# Patient Record
Sex: FEMALE | Race: WHITE | NOT HISPANIC OR LATINO | URBAN - METROPOLITAN AREA
[De-identification: names, ages, dates, MRNs, and addresses within clinical notes are randomized per-mention and may not be internally consistent; named-entity substitution may affect disease eponyms.]

---

## 2017-03-07 ENCOUNTER — INPATIENT (INPATIENT)
Facility: HOSPITAL | Age: 82
LOS: 1 days | Discharge: HOME | End: 2017-03-09
Attending: INTERNAL MEDICINE | Admitting: INTERNAL MEDICINE
Payer: MEDICARE

## 2017-03-07 VITALS
OXYGEN SATURATION: 100 % | HEART RATE: 89 BPM | SYSTOLIC BLOOD PRESSURE: 131 MMHG | RESPIRATION RATE: 16 BRPM | TEMPERATURE: 98 F | DIASTOLIC BLOOD PRESSURE: 86 MMHG

## 2017-03-07 DIAGNOSIS — Z41.8 ENCOUNTER FOR OTHER PROCEDURES FOR PURPOSES OTHER THAN REMEDYING HEALTH STATE: ICD-10-CM

## 2017-03-07 DIAGNOSIS — I20.0 UNSTABLE ANGINA: ICD-10-CM

## 2017-03-07 DIAGNOSIS — I48.91 UNSPECIFIED ATRIAL FIBRILLATION: ICD-10-CM

## 2017-03-07 DIAGNOSIS — I10 ESSENTIAL (PRIMARY) HYPERTENSION: ICD-10-CM

## 2017-03-07 DIAGNOSIS — I25.10 ATHEROSCLEROTIC HEART DISEASE OF NATIVE CORONARY ARTERY WITHOUT ANGINA PECTORIS: ICD-10-CM

## 2017-03-07 LAB
ALBUMIN SERPL ELPH-MCNC: 4.1 G/DL — SIGNIFICANT CHANGE UP (ref 3.3–5)
ALP SERPL-CCNC: 94 U/L — SIGNIFICANT CHANGE UP (ref 40–120)
ALT FLD-CCNC: 13 U/L — SIGNIFICANT CHANGE UP (ref 4–33)
APTT BLD: 29.7 SEC — SIGNIFICANT CHANGE UP (ref 27.5–37.4)
APTT BLD: 34.8 SEC — SIGNIFICANT CHANGE UP (ref 27.5–37.4)
AST SERPL-CCNC: 33 U/L — HIGH (ref 4–32)
BASE EXCESS BLDV CALC-SCNC: 3.1 MMOL/L — SIGNIFICANT CHANGE UP
BASOPHILS # BLD AUTO: 0.01 K/UL — SIGNIFICANT CHANGE UP (ref 0–0.2)
BASOPHILS NFR BLD AUTO: 0.2 % — SIGNIFICANT CHANGE UP (ref 0–2)
BILIRUB SERPL-MCNC: 0.3 MG/DL — SIGNIFICANT CHANGE UP (ref 0.2–1.2)
BLOOD GAS VENOUS - CREATININE: 0.86 MG/DL — SIGNIFICANT CHANGE UP (ref 0.5–1.3)
BUN SERPL-MCNC: 24 MG/DL — HIGH (ref 7–23)
CALCIUM SERPL-MCNC: 9.7 MG/DL — SIGNIFICANT CHANGE UP (ref 8.4–10.5)
CHLORIDE BLDV-SCNC: 106 MMOL/L — SIGNIFICANT CHANGE UP (ref 96–108)
CHLORIDE SERPL-SCNC: 105 MMOL/L — SIGNIFICANT CHANGE UP (ref 98–107)
CK MB BLD-MCNC: 10.84 NG/ML — HIGH (ref 1–4.7)
CK MB BLD-MCNC: 7.57 NG/ML — HIGH (ref 1–4.7)
CK MB BLD-MCNC: SIGNIFICANT CHANGE UP (ref 0–2.5)
CK SERPL-CCNC: 142 U/L — SIGNIFICANT CHANGE UP (ref 25–170)
CK SERPL-CCNC: 148 U/L — SIGNIFICANT CHANGE UP (ref 25–170)
CO2 SERPL-SCNC: 20 MMOL/L — LOW (ref 22–31)
CREAT SERPL-MCNC: 0.84 MG/DL — SIGNIFICANT CHANGE UP (ref 0.5–1.3)
EOSINOPHIL # BLD AUTO: 0.03 K/UL — SIGNIFICANT CHANGE UP (ref 0–0.5)
EOSINOPHIL NFR BLD AUTO: 0.5 % — SIGNIFICANT CHANGE UP (ref 0–6)
GAS PNL BLDV: 138 MMOL/L — SIGNIFICANT CHANGE UP (ref 136–146)
GLUCOSE BLDV-MCNC: 103 — HIGH (ref 70–99)
GLUCOSE SERPL-MCNC: 103 MG/DL — HIGH (ref 70–99)
HCO3 BLDV-SCNC: 25 MMOL/L — SIGNIFICANT CHANGE UP (ref 20–27)
HCT VFR BLD CALC: 39.9 % — SIGNIFICANT CHANGE UP (ref 34.5–45)
HCT VFR BLDV CALC: 39.1 % — SIGNIFICANT CHANGE UP (ref 34.5–45)
HGB BLD-MCNC: 12.7 G/DL — SIGNIFICANT CHANGE UP (ref 11.5–15.5)
HGB BLDV-MCNC: 12.7 G/DL — SIGNIFICANT CHANGE UP (ref 11.5–15.5)
IMM GRANULOCYTES NFR BLD AUTO: 0.2 % — SIGNIFICANT CHANGE UP (ref 0–1.5)
INR BLD: 0.97 — SIGNIFICANT CHANGE UP (ref 0.87–1.18)
INR BLD: 1.06 — SIGNIFICANT CHANGE UP (ref 0.87–1.18)
LACTATE BLDV-MCNC: 2 MMOL/L — SIGNIFICANT CHANGE UP (ref 0.5–2)
LYMPHOCYTES # BLD AUTO: 1.24 K/UL — SIGNIFICANT CHANGE UP (ref 1–3.3)
LYMPHOCYTES # BLD AUTO: 22.2 % — SIGNIFICANT CHANGE UP (ref 13–44)
MCHC RBC-ENTMCNC: 28.5 PG — SIGNIFICANT CHANGE UP (ref 27–34)
MCHC RBC-ENTMCNC: 31.8 % — LOW (ref 32–36)
MCV RBC AUTO: 89.7 FL — SIGNIFICANT CHANGE UP (ref 80–100)
MONOCYTES # BLD AUTO: 0.26 K/UL — SIGNIFICANT CHANGE UP (ref 0–0.9)
MONOCYTES NFR BLD AUTO: 4.7 % — SIGNIFICANT CHANGE UP (ref 2–14)
NEUTROPHILS # BLD AUTO: 4.03 K/UL — SIGNIFICANT CHANGE UP (ref 1.8–7.4)
NEUTROPHILS NFR BLD AUTO: 72.2 % — SIGNIFICANT CHANGE UP (ref 43–77)
NT-PROBNP SERPL-SCNC: 1114 PG/ML — SIGNIFICANT CHANGE UP
PCO2 BLDV: 55 MMHG — HIGH (ref 41–51)
PH BLDV: 7.33 PH — SIGNIFICANT CHANGE UP (ref 7.32–7.43)
PLATELET # BLD AUTO: 237 K/UL — SIGNIFICANT CHANGE UP (ref 150–400)
PMV BLD: 10.6 FL — SIGNIFICANT CHANGE UP (ref 7–13)
PO2 BLDV: 29 MMHG — LOW (ref 35–40)
POTASSIUM BLDV-SCNC: 4.9 MMOL/L — HIGH (ref 3.4–4.5)
POTASSIUM SERPL-MCNC: 4.5 MMOL/L — SIGNIFICANT CHANGE UP (ref 3.5–5.3)
POTASSIUM SERPL-SCNC: 4.5 MMOL/L — SIGNIFICANT CHANGE UP (ref 3.5–5.3)
PROT SERPL-MCNC: 8 G/DL — SIGNIFICANT CHANGE UP (ref 6–8.3)
PROTHROM AB SERPL-ACNC: 11.1 SEC — SIGNIFICANT CHANGE UP (ref 10–13.1)
PROTHROM AB SERPL-ACNC: 12.1 SEC — SIGNIFICANT CHANGE UP (ref 10–13.1)
RBC # BLD: 4.45 M/UL — SIGNIFICANT CHANGE UP (ref 3.8–5.2)
RBC # FLD: 14.3 % — SIGNIFICANT CHANGE UP (ref 10.3–14.5)
SAO2 % BLDV: 44.2 % — LOW (ref 60–85)
SODIUM SERPL-SCNC: 141 MMOL/L — SIGNIFICANT CHANGE UP (ref 135–145)
T4 FREE SERPL-MCNC: 1.07 NG/DL — SIGNIFICANT CHANGE UP (ref 0.9–1.8)
TROPONIN T SERPL-MCNC: 0.07 NG/ML — HIGH (ref 0–0.06)
TROPONIN T SERPL-MCNC: < 0.06 NG/ML — SIGNIFICANT CHANGE UP (ref 0–0.06)
TSH SERPL-MCNC: 2.95 UIU/ML — SIGNIFICANT CHANGE UP (ref 0.27–4.2)
WBC # BLD: 5.58 K/UL — SIGNIFICANT CHANGE UP (ref 3.8–10.5)
WBC # FLD AUTO: 5.58 K/UL — SIGNIFICANT CHANGE UP (ref 3.8–10.5)

## 2017-03-07 PROCEDURE — 71010: CPT | Mod: 26

## 2017-03-07 RX ORDER — METOPROLOL TARTRATE 50 MG
12.5 TABLET ORAL
Qty: 0 | Refills: 0 | Status: DISCONTINUED | OUTPATIENT
Start: 2017-03-07 | End: 2017-03-08

## 2017-03-07 RX ORDER — DIGOXIN 250 MCG
0.5 TABLET ORAL ONCE
Qty: 0 | Refills: 0 | Status: COMPLETED | OUTPATIENT
Start: 2017-03-07 | End: 2017-03-07

## 2017-03-07 RX ORDER — HEPARIN SODIUM 5000 [USP'U]/ML
INJECTION INTRAVENOUS; SUBCUTANEOUS
Qty: 25000 | Refills: 0 | Status: DISCONTINUED | OUTPATIENT
Start: 2017-03-07 | End: 2017-03-08

## 2017-03-07 RX ORDER — CLOPIDOGREL BISULFATE 75 MG/1
75 TABLET, FILM COATED ORAL DAILY
Qty: 0 | Refills: 0 | Status: DISCONTINUED | OUTPATIENT
Start: 2017-03-07 | End: 2017-03-09

## 2017-03-07 RX ORDER — CHOLECALCIFEROL (VITAMIN D3) 125 MCG
1000 CAPSULE ORAL DAILY
Qty: 0 | Refills: 0 | Status: DISCONTINUED | OUTPATIENT
Start: 2017-03-07 | End: 2017-03-09

## 2017-03-07 RX ORDER — HEPARIN SODIUM 5000 [USP'U]/ML
3000 INJECTION INTRAVENOUS; SUBCUTANEOUS EVERY 6 HOURS
Qty: 0 | Refills: 0 | Status: DISCONTINUED | OUTPATIENT
Start: 2017-03-07 | End: 2017-03-08

## 2017-03-07 RX ORDER — HEPARIN SODIUM 5000 [USP'U]/ML
6500 INJECTION INTRAVENOUS; SUBCUTANEOUS EVERY 6 HOURS
Qty: 0 | Refills: 0 | Status: DISCONTINUED | OUTPATIENT
Start: 2017-03-07 | End: 2017-03-08

## 2017-03-07 RX ADMIN — HEPARIN SODIUM 1400 UNIT(S)/HR: 5000 INJECTION INTRAVENOUS; SUBCUTANEOUS at 18:54

## 2017-03-07 RX ADMIN — Medication 0.5 MILLIGRAM(S): at 11:49

## 2017-03-07 NOTE — ED PROVIDER NOTE - ATTENDING CONTRIBUTION TO CARE
87F PMH HTN, CAD s/p quadruple bypass and 4 stents with 2 re-occluding p/w substernal chest pressure, nonradiating since 5:30am a/w nausea, SOB and mild diaphoresis. PT took 3 sublingual nitro tabs at home, received additional 1 nitro spray by EMS with improvement of pain (now 1/10 from 10/10). No abdominal pain, fever/chills. On exam patient in no distress.  a fib with RVR, O2 sat 99 on 2L, lungs clear heart sounds normal abdo soft non tender ext no swelling/tenderness, non focal neuro exam. EKG a fib with V rate 115, TWI in I and aVL, ST depression in V3-5. New since 2010. Imp Unstable angina/?NSTEMI assoc with likely new onset a fib with slightly rapid VR. Plan: O2, monitor, labs, CE, CXR, IV Diltiazem for rate control, tele admission. Will d/w [facility] hospitalist

## 2017-03-07 NOTE — H&P ADULT. - NEGATIVE GENERAL SYMPTOMS
no fatigue/no polyuria/no fever/no polydipsia/no anorexia/no weight gain/no weight loss/no chills/no polyphagia/no malaise

## 2017-03-07 NOTE — ED ADULT NURSE NOTE - OBJECTIVE STATEMENT
Patient received into room 2 AA&Ox3 c/o of chest pressure and pain with nausea and SOB since this AM - pt. notes taking 3 sublingual nitroglycerin tablets at home with 1 nitro spray by EMS that resolved pain. VSS on 2L NC. Patient denies chest pain, N/V, SOB, abdominal pain, fever, chills at this time. Patient able to ambulate with standby assist, skin intact. 20g PIV in place to left FA, labs drawn, medications administered - will continue to monitor.

## 2017-03-07 NOTE — H&P ADULT. - RS GEN PE MLT RESP DETAILS PC
no chest wall tenderness/normal/no rhonchi/breath sounds equal/no intercostal retractions/clear to auscultation bilaterally/good air movement/airway patent/no wheezes/no rales/respirations non-labored/no subcutaneous emphysema

## 2017-03-07 NOTE — H&P ADULT. - NEGATIVE NEUROLOGICAL SYMPTOMS
no tremors/no facial palsy/no generalized seizures/no hemiparesis/no loss of consciousness/no paresthesias/no difficulty walking/no confusion/no focal seizures/no loss of sensation/no transient paralysis/no weakness/no vertigo/no syncope

## 2017-03-07 NOTE — H&P ADULT. - HISTORY OF PRESENT ILLNESS
86 y/o F with PMhx of CAD s/p quadruple bypass (1989) and 4 stents presents to ED with generalized chest pain x 1 day. Pt states the pain was a 10/10 and felt like a burning sensation that radiated up to her neck and felt like it was "choking" her. Pt endorses associated HA, SOB, diaphoresis, nausea and episodes of dizziness. Pt took 4 nitro at home which alleviated her symptoms minimally. EMS gave the pt 2 nitro sprays and put her on oxygen, which did relieve her symptoms. Pt states when she got to the ED her pain was at a 3/10, and is now at a 0/10. Pt denies fever, chills, abdominal pain, vomiting, visual disturbances or urinary symptoms.    Upon arrival to the ED pt was found in rapid AFib s/p digoxin 0.5mg IVP, diltiazem 10mg IVP, diltiazem 60mg PO and metoprolol 12.5mg PO. She converted to NSR.   Pt was seen by house cardiology, plan for TTE tomorrow. Keep NPO after midnight in case the pt goes back into A fib and there is need for YARITZA/DCCV.

## 2017-03-07 NOTE — H&P ADULT. - NEUROLOGICAL DETAILS
responds to pain/no spontaneous movement/normal strength/responds to verbal commands/alert and oriented x 3/sensation intact

## 2017-03-07 NOTE — ED PROVIDER NOTE - PROGRESS NOTE DETAILS
After 10mg IV cardizem, rate responded, now in 80's-90's, 's systolic. For admission under [facility] hospitalist for r/o ACS, new afib. [facility] answering service called, hospitalist paged x 1, awaiting callback. D/w [facility] hospitalist; accepted for admission. Rate back to 130's,  systolic will give additional 10mg IV cardizem. Given ongoing pain and recurrent rapid afib, [facility] hospitalist amenable to calling house cardiology for rapid eval. MAR textpage sent. Pt and family aware and amenable to plan. Attending progress note: Patient c/o chest pain again. HR now 130 /70, EKG unchanged from prior. Treated with O2, and further IV cardizem for rate control, with improvement of symptoms. [facility] hospitalist aware and recommends house cardiology consult- notified. Admitted to tele

## 2017-03-07 NOTE — ED ADULT NURSE NOTE - CHIEF COMPLAINT QUOTE
brought in by EMS from home for c/o chest tightness since 0530 this am. Pt took own NTG SL x 3, EMS gave ASA 162mg, NTG spray x 1. Pt rates pain 1/10 now from 10/10. Hx CABGx 4 ,stents x 2, Angina. HR irregular, ranging 60s-120s in triage. Pt and daughter denies any hx Afib

## 2017-03-07 NOTE — ED ADULT TRIAGE NOTE - CHIEF COMPLAINT QUOTE
brought in by EMS from home for c/o chest tightness since 0530 this am. Pt took own NTG SL x 3, EMS gave ASA 162mg, NTG spray x 1. Pt rates pain 1/10 now from 10/10. Hx CABGx 4 ,stents x 2, Angina. brought in by EMS from home for c/o chest tightness since 0530 this am. Pt took own NTG SL x 3, EMS gave ASA 162mg, NTG spray x 1. Pt rates pain 1/10 now from 10/10. Hx CABGx 4 ,stents x 2, Angina. HR irregular, ranging 60s-120s in triage. Pt and daughter denies any hx Afib

## 2017-03-07 NOTE — H&P ADULT. - FAMILY HISTORY
Father  Still living? Unknown  Family history of hypertension, Age at diagnosis: Age Unknown     Mother  Still living? Unknown  Family history of hypertension, Age at diagnosis: Age Unknown

## 2017-03-07 NOTE — ED PROVIDER NOTE - OBJECTIVE STATEMENT
87F PMH HTN, CAD s/p quadruple bypass and 4 stents with 2 re-occluding p/w substernal chest pressure, nonradiating since 5:30am a/w nausea, SOB and mild diaphoresis. PT took 3 sublingual nitro tabs at home, received additional 1 nitro spray by EMS with improvement of pain (now 1/10 from 10/10). No abdominal pain, fever/chills.

## 2017-03-07 NOTE — H&P ADULT. - PROBLEM SELECTOR PLAN 2
- Continue Plavix 75mg QD  - no ASA as per Dr. Goldberg (cardiologist) - 2013 - Continue Plavix 75mg QD  - no ASA as per Dr. Goldberg (private cardiologist) - 2013

## 2017-03-07 NOTE — H&P ADULT. - PROBLEM SELECTOR PLAN 1
- Admit to telemetry  - Serial EKG's, trend CE's  - CBC, BMP, lipids in the am  - plan for TTE tomorrow.   - keep pt NPO after midnight in case she goes back into afib and will require YARITZA/DCCV in the am  - F/U house cardiology consult - Admit to telemetry  - Serial EKG's, trend CE's  - CBC, BMP, lipids in the am  - Seen by house cards - if converts back in to AFib, plan for TTE/DCCV tomorrow, c/w plavix, start heparin drip, metoprolol 12.5mg bid, check TTE  - F/U house cardiology consult

## 2017-03-07 NOTE — H&P ADULT. - ASSESSMENT
86 yo F with PMHx of CAD s/p quadruple bypass (1989) and 4 stents admitted to telemetry with chest pain and new onset AFib.

## 2017-03-07 NOTE — H&P ADULT. - NEGATIVE GASTROINTESTINAL SYMPTOMS
no abdominal pain/no flatulence/no jaundice/no hiccoughs/no steatorrhea/no melena/no hematochezia/no vomiting/no constipation/no diarrhea/no change in bowel habits

## 2017-03-07 NOTE — H&P ADULT. - NEGATIVE CARDIOVASCULAR SYMPTOMS
no peripheral edema/no claudication/no palpitations/no paroxysmal nocturnal dyspnea/no orthopnea no paroxysmal nocturnal dyspnea/no peripheral edema/no orthopnea/no claudication

## 2017-03-07 NOTE — H&P ADULT. - PMH
Benign Hypertension    Bilateral Acute Angle-Closure Glaucoma    Coronary artery disease    Hyperlipidemia, Acquired

## 2017-03-07 NOTE — H&P ADULT. - ATTENDING COMMENTS
Chart reviewed. Vitals and Labs noted.   Pt seen and examined at bedside. Plan formulated with the resident/PA/NP. Detail H&P as above.   Admitted for chest pain, burning sensation, dyspnea. Atypical chest pain, likely demand ischemia in the setting of new onset a.fib. House card consulted by the ED. rate controlled. NPO after MN. Hep drip. May need YARITZA with cardioversion if remained in a.fib. Currently in sinus.   Card f/u. Trend cardiac enzymes. Tele.   DVT ppx  Dr. Paredes will cover me starting 3/8/17

## 2017-03-07 NOTE — H&P ADULT. - MUSCULOSKELETAL
details… detailed exam no calf tenderness/ROM intact/normal/no joint erythema/no joint swelling/no joint warmth

## 2017-03-07 NOTE — ED PROVIDER NOTE - MEDICAL DECISION MAKING DETAILS
87F with chest pressure and SOB, concerning for ACS, also in new onset rapid afib. Will need cardiac enzymes, cxr, ekg, cardizen, cbc, cmp, admission.

## 2017-03-08 LAB
APTT BLD: 101.6 SEC — HIGH (ref 27.5–37.4)
APTT BLD: 131.3 SEC — CRITICAL HIGH (ref 27.5–37.4)
BUN SERPL-MCNC: 18 MG/DL — SIGNIFICANT CHANGE UP (ref 7–23)
CALCIUM SERPL-MCNC: 10.4 MG/DL — SIGNIFICANT CHANGE UP (ref 8.4–10.5)
CHLORIDE SERPL-SCNC: 106 MMOL/L — SIGNIFICANT CHANGE UP (ref 98–107)
CHOLEST SERPL-MCNC: 232 MG/DL — HIGH (ref 120–199)
CK MB BLD-MCNC: 9.3 NG/ML — HIGH (ref 1–4.7)
CK SERPL-CCNC: 129 U/L — SIGNIFICANT CHANGE UP (ref 25–170)
CO2 SERPL-SCNC: 25 MMOL/L — SIGNIFICANT CHANGE UP (ref 22–31)
CREAT SERPL-MCNC: 0.77 MG/DL — SIGNIFICANT CHANGE UP (ref 0.5–1.3)
GLUCOSE SERPL-MCNC: 90 MG/DL — SIGNIFICANT CHANGE UP (ref 70–99)
HBA1C BLD-MCNC: 5.7 % — HIGH (ref 4–5.6)
HCT VFR BLD CALC: 35.2 % — SIGNIFICANT CHANGE UP (ref 34.5–45)
HCT VFR BLD CALC: 38.5 % — SIGNIFICANT CHANGE UP (ref 34.5–45)
HDLC SERPL-MCNC: 67 MG/DL — HIGH (ref 45–65)
HGB BLD-MCNC: 11 G/DL — LOW (ref 11.5–15.5)
HGB BLD-MCNC: 12.1 G/DL — SIGNIFICANT CHANGE UP (ref 11.5–15.5)
INR BLD: 1.1 — SIGNIFICANT CHANGE UP (ref 0.87–1.18)
LIPID PNL WITH DIRECT LDL SERPL: 165 MG/DL — SIGNIFICANT CHANGE UP
MAGNESIUM SERPL-MCNC: 2.2 MG/DL — SIGNIFICANT CHANGE UP (ref 1.6–2.6)
MCHC RBC-ENTMCNC: 27.6 PG — SIGNIFICANT CHANGE UP (ref 27–34)
MCHC RBC-ENTMCNC: 28.1 PG — SIGNIFICANT CHANGE UP (ref 27–34)
MCHC RBC-ENTMCNC: 31.3 % — LOW (ref 32–36)
MCHC RBC-ENTMCNC: 31.4 % — LOW (ref 32–36)
MCV RBC AUTO: 88.2 FL — SIGNIFICANT CHANGE UP (ref 80–100)
MCV RBC AUTO: 89.3 FL — SIGNIFICANT CHANGE UP (ref 80–100)
PLATELET # BLD AUTO: 228 K/UL — SIGNIFICANT CHANGE UP (ref 150–400)
PLATELET # BLD AUTO: 280 K/UL — SIGNIFICANT CHANGE UP (ref 150–400)
PMV BLD: 10.3 FL — SIGNIFICANT CHANGE UP (ref 7–13)
PMV BLD: 11.2 FL — SIGNIFICANT CHANGE UP (ref 7–13)
POTASSIUM SERPL-MCNC: 4.3 MMOL/L — SIGNIFICANT CHANGE UP (ref 3.5–5.3)
POTASSIUM SERPL-SCNC: 4.3 MMOL/L — SIGNIFICANT CHANGE UP (ref 3.5–5.3)
PROTHROM AB SERPL-ACNC: 12.5 SEC — SIGNIFICANT CHANGE UP (ref 10–13.1)
RBC # BLD: 3.99 M/UL — SIGNIFICANT CHANGE UP (ref 3.8–5.2)
RBC # BLD: 4.31 M/UL — SIGNIFICANT CHANGE UP (ref 3.8–5.2)
RBC # FLD: 14.1 % — SIGNIFICANT CHANGE UP (ref 10.3–14.5)
RBC # FLD: 14.4 % — SIGNIFICANT CHANGE UP (ref 10.3–14.5)
SODIUM SERPL-SCNC: 144 MMOL/L — SIGNIFICANT CHANGE UP (ref 135–145)
TRIGL SERPL-MCNC: 88 MG/DL — SIGNIFICANT CHANGE UP (ref 10–149)
TROPONIN T SERPL-MCNC: 0.14 NG/ML — HIGH (ref 0–0.06)
WBC # BLD: 4.63 K/UL — SIGNIFICANT CHANGE UP (ref 3.8–10.5)
WBC # BLD: 5.07 K/UL — SIGNIFICANT CHANGE UP (ref 3.8–10.5)
WBC # FLD AUTO: 4.63 K/UL — SIGNIFICANT CHANGE UP (ref 3.8–10.5)
WBC # FLD AUTO: 5.07 K/UL — SIGNIFICANT CHANGE UP (ref 3.8–10.5)

## 2017-03-08 PROCEDURE — 92941 PRQ TRLML REVSC TOT OCCL AMI: CPT | Mod: LC

## 2017-03-08 PROCEDURE — 93010 ELECTROCARDIOGRAM REPORT: CPT | Mod: 76

## 2017-03-08 PROCEDURE — 93306 TTE W/DOPPLER COMPLETE: CPT | Mod: 26

## 2017-03-08 PROCEDURE — 76937 US GUIDE VASCULAR ACCESS: CPT | Mod: 26

## 2017-03-08 PROCEDURE — 93455 CORONARY ART/GRFT ANGIO S&I: CPT | Mod: 26,59

## 2017-03-08 PROCEDURE — 99233 SBSQ HOSP IP/OBS HIGH 50: CPT | Mod: 25,GC

## 2017-03-08 RX ORDER — ONDANSETRON 8 MG/1
4 TABLET, FILM COATED ORAL ONCE
Qty: 0 | Refills: 0 | Status: COMPLETED | OUTPATIENT
Start: 2017-03-08 | End: 2017-03-08

## 2017-03-08 RX ORDER — ASPIRIN/CALCIUM CARB/MAGNESIUM 324 MG
81 TABLET ORAL DAILY
Qty: 0 | Refills: 0 | Status: DISCONTINUED | OUTPATIENT
Start: 2017-03-09 | End: 2017-03-09

## 2017-03-08 RX ORDER — SODIUM CHLORIDE 9 MG/ML
500 INJECTION INTRAMUSCULAR; INTRAVENOUS; SUBCUTANEOUS
Qty: 0 | Refills: 0 | Status: DISCONTINUED | OUTPATIENT
Start: 2017-03-08 | End: 2017-03-08

## 2017-03-08 RX ORDER — ATORVASTATIN CALCIUM 80 MG/1
20 TABLET, FILM COATED ORAL AT BEDTIME
Qty: 0 | Refills: 0 | Status: DISCONTINUED | OUTPATIENT
Start: 2017-03-08 | End: 2017-03-09

## 2017-03-08 RX ADMIN — HEPARIN SODIUM 0 UNIT(S)/HR: 5000 INJECTION INTRAVENOUS; SUBCUTANEOUS at 10:43

## 2017-03-08 RX ADMIN — HEPARIN SODIUM 1200 UNIT(S)/HR: 5000 INJECTION INTRAVENOUS; SUBCUTANEOUS at 01:40

## 2017-03-08 RX ADMIN — SODIUM CHLORIDE 75 MILLILITER(S): 9 INJECTION INTRAMUSCULAR; INTRAVENOUS; SUBCUTANEOUS at 18:56

## 2017-03-08 RX ADMIN — HEPARIN SODIUM 900 UNIT(S)/HR: 5000 INJECTION INTRAVENOUS; SUBCUTANEOUS at 11:46

## 2017-03-08 RX ADMIN — CLOPIDOGREL BISULFATE 75 MILLIGRAM(S): 75 TABLET, FILM COATED ORAL at 11:49

## 2017-03-08 RX ADMIN — ONDANSETRON 4 MILLIGRAM(S): 8 TABLET, FILM COATED ORAL at 18:57

## 2017-03-08 RX ADMIN — ATORVASTATIN CALCIUM 20 MILLIGRAM(S): 80 TABLET, FILM COATED ORAL at 22:01

## 2017-03-08 RX ADMIN — Medication 1000 UNIT(S): at 11:49

## 2017-03-09 VITALS
DIASTOLIC BLOOD PRESSURE: 52 MMHG | TEMPERATURE: 98 F | OXYGEN SATURATION: 96 % | RESPIRATION RATE: 16 BRPM | SYSTOLIC BLOOD PRESSURE: 127 MMHG | HEART RATE: 56 BPM

## 2017-03-09 LAB
BUN SERPL-MCNC: 16 MG/DL — SIGNIFICANT CHANGE UP (ref 7–23)
CALCIUM SERPL-MCNC: 10.1 MG/DL — SIGNIFICANT CHANGE UP (ref 8.4–10.5)
CHLORIDE SERPL-SCNC: 105 MMOL/L — SIGNIFICANT CHANGE UP (ref 98–107)
CO2 SERPL-SCNC: 22 MMOL/L — SIGNIFICANT CHANGE UP (ref 22–31)
CREAT SERPL-MCNC: 0.75 MG/DL — SIGNIFICANT CHANGE UP (ref 0.5–1.3)
GLUCOSE SERPL-MCNC: 83 MG/DL — SIGNIFICANT CHANGE UP (ref 70–99)
HCT VFR BLD CALC: 37.2 % — SIGNIFICANT CHANGE UP (ref 34.5–45)
HGB BLD-MCNC: 11.7 G/DL — SIGNIFICANT CHANGE UP (ref 11.5–15.5)
MAGNESIUM SERPL-MCNC: 2.2 MG/DL — SIGNIFICANT CHANGE UP (ref 1.6–2.6)
MCHC RBC-ENTMCNC: 28.1 PG — SIGNIFICANT CHANGE UP (ref 27–34)
MCHC RBC-ENTMCNC: 31.5 % — LOW (ref 32–36)
MCV RBC AUTO: 89.2 FL — SIGNIFICANT CHANGE UP (ref 80–100)
PHOSPHATE SERPL-MCNC: 3.5 MG/DL — SIGNIFICANT CHANGE UP (ref 2.5–4.5)
PLATELET # BLD AUTO: 251 K/UL — SIGNIFICANT CHANGE UP (ref 150–400)
PMV BLD: 10.6 FL — SIGNIFICANT CHANGE UP (ref 7–13)
POTASSIUM SERPL-MCNC: 4.2 MMOL/L — SIGNIFICANT CHANGE UP (ref 3.5–5.3)
POTASSIUM SERPL-SCNC: 4.2 MMOL/L — SIGNIFICANT CHANGE UP (ref 3.5–5.3)
RBC # BLD: 4.17 M/UL — SIGNIFICANT CHANGE UP (ref 3.8–5.2)
RBC # FLD: 14.3 % — SIGNIFICANT CHANGE UP (ref 10.3–14.5)
SODIUM SERPL-SCNC: 140 MMOL/L — SIGNIFICANT CHANGE UP (ref 135–145)
WBC # BLD: 5.32 K/UL — SIGNIFICANT CHANGE UP (ref 3.8–10.5)
WBC # FLD AUTO: 5.32 K/UL — SIGNIFICANT CHANGE UP (ref 3.8–10.5)

## 2017-03-09 RX ORDER — CLOPIDOGREL BISULFATE 75 MG/1
1 TABLET, FILM COATED ORAL
Qty: 0 | Refills: 0 | COMMUNITY

## 2017-03-09 RX ORDER — CLOPIDOGREL BISULFATE 75 MG/1
1 TABLET, FILM COATED ORAL
Qty: 0 | Refills: 0 | DISCHARGE
Start: 2017-03-09

## 2017-03-09 RX ORDER — ATORVASTATIN CALCIUM 80 MG/1
1 TABLET, FILM COATED ORAL
Qty: 30 | Refills: 0
Start: 2017-03-09 | End: 2017-04-08

## 2017-03-09 RX ORDER — APIXABAN 2.5 MG/1
1 TABLET, FILM COATED ORAL
Qty: 60 | Refills: 0
Start: 2017-03-09 | End: 2017-04-08

## 2017-03-09 RX ORDER — ASPIRIN/CALCIUM CARB/MAGNESIUM 324 MG
1 TABLET ORAL
Qty: 0 | Refills: 0 | DISCHARGE
Start: 2017-03-09

## 2017-03-09 RX ADMIN — Medication 1000 UNIT(S): at 11:26

## 2017-03-09 RX ADMIN — CLOPIDOGREL BISULFATE 75 MILLIGRAM(S): 75 TABLET, FILM COATED ORAL at 11:26

## 2017-03-09 RX ADMIN — Medication 81 MILLIGRAM(S): at 11:26

## 2017-03-09 NOTE — DISCHARGE NOTE ADULT - CARE PLAN
Principal Discharge DX:	Unstable angina  Goal:	Followup with PMD and take all medications prescribed.  Instructions for follow-up, activity and diet:	Followup with PMD and take all medications prescribed. Low salt, low fat, low cholesterol diet  Secondary Diagnosis:	New onset atrial fibrillation  Goal:	Followup with PMD and take all medications prescribed.  Instructions for follow-up, activity and diet:	Followup with PMD and take all medications prescribed. Low salt, low fat, low cholesterol diet.  Stop aspirin in 1 month. Continue with all other meds.  Secondary Diagnosis:	Coronary artery disease  Goal:	Followup with PMD and take all medications prescribed.  Instructions for follow-up, activity and diet:	Followup with PMD and take all medications prescribed. Low salt, low fat, low cholesterol diet  Secondary Diagnosis:	Benign Hypertension  Goal:	Followup with PMD and take all medications prescribed.  Instructions for follow-up, activity and diet:	Followup with PMD and take all medications prescribed. Low salt, low fat, low cholesterol diet

## 2017-03-09 NOTE — DISCHARGE NOTE ADULT - MEDICATION SUMMARY - MEDICATIONS TO TAKE
I will START or STAY ON the medications listed below when I get home from the hospital:    aspirin 81 mg oral delayed release tablet  -- 1 tab(s) by mouth once a day stop in 1 month as per cardiology 4/9/17.  -- Indication: For Coronary artery disease    Eliquis 2.5 mg oral tablet  -- 1 tab(s) by mouth 2 times a day  -- Check with your doctor before becoming pregnant.  It is very important that you take or use this exactly as directed.  Do not skip doses or discontinue unless directed by your doctor.  Obtain medical advice before taking any non-prescription drugs as some may affect the action of this medication.    -- Indication: For New onset atrial fibrillation    atorvastatin 20 mg oral tablet  -- 1 tab(s) by mouth once a day (at bedtime)  -- Indication: For Coronary artery disease    clopidogrel 75 mg oral tablet  -- 1 tab(s) by mouth once a day  -- Indication: For Coronary artery disease    Vitamin D3 1000 intl units oral capsule  -- 1 cap(s) by mouth once a day  -- Indication: For vitamin

## 2017-03-09 NOTE — PROVIDER CONTACT NOTE (OTHER) - ACTION/TREATMENT ORDERED:
Will cont to monitor
pa aware. follow nomogram.
Heparin gtt on hold for one hour and will restart at 11:43 at 9 ml/hr.

## 2017-03-09 NOTE — DISCHARGE NOTE ADULT - PLAN OF CARE
Followup with PMD and take all medications prescribed. Followup with PMD and take all medications prescribed. Low salt, low fat, low cholesterol diet Followup with PMD and take all medications prescribed. Low salt, low fat, low cholesterol diet.  Stop aspirin in 1 month. Continue with all other meds.

## 2017-03-09 NOTE — DISCHARGE NOTE ADULT - CARE PROVIDERS DIRECT ADDRESSES
[email or web address]

## 2017-03-09 NOTE — DISCHARGE NOTE ADULT - PROVIDER TOKENS
TOKEN:'[id number]',FREE:[LAST:[Dr Goldberg - Cardiology],PHONE:[(   )    -],FAX:[(   )    -]] TOKEN:'[id number]',TOKEN:'[id number]'

## 2017-03-09 NOTE — DISCHARGE NOTE ADULT - HOSPITAL COURSE
87 female pmh Glaucoma, HLD, HTN, CAD s/p CABG and stents p/w CP and new onset Afib  EKG Afib  @ 106, TWI V5-6, II, aVL  Repeat EKG: SB @ 54, TWI V4 - V6, AVL, AVF  CE#1: Trop neg, CKMB 7.57,   CE#2: Trop 0.07, CKMB 10.94,   CE#3: Trop 0.14   CKMB 9.30      ProBNP- 1114  CXR Clear  House cards - start metoprolol 12.5mg BID, if rate not controlled start Dig 0.125mg daily, start heparin drip, NPO p MN for possible YARITZA/DCCV in AM, c/w Plavix, trend CE, TTE   3/8- Patient converted back to SR - Sinus Philip- YARITZA/ DCCV cancelled  3/8- Echo- EF 63%-  Normal left atrium.  LA volume index = 31 cc/m2. Normal left ventricular internal dimensions and wall thicknesses. Endocardium not well visualized; grossly normal left ventricular systolic function. The right ventricle is not well visualized.  Estimated pulmonary artery systolic pressure equals 35 mm Hg, assuming right atrial pressure equals 10  mm Hg, consistent with borderline pulmonary hypertension.    3/8 CATH: pLCx 95 treated with successful resolute stent; LIMA to LAD patent, SVG to Diag patent; 2grafts down; RFA angioseal closure device 87 female pmh Glaucoma, HLD, HTN, CAD s/p CABG and stents p/w CP and new onset Afib  EKG Afib  @ 106, TWI V5-6, II, aVL  Repeat EKG: SB @ 54, TWI V4 - V6, AVL, AVF  CE#1: Trop neg, CKMB 7.57,   CE#2: Trop 0.07, CKMB 10.94,   CE#3: Trop 0.14   CKMB 9.30      ProBNP- 1114  CXR Clear  House cards - start metoprolol 12.5mg BID, if rate not controlled start Dig 0.125mg daily, start heparin drip, NPO p MN for possible YARITZA/DCCV in AM, c/w Plavix, trend CE, TTE   3/8- Patient converted back to SR - Sinus Philip- YARITZA/ DCCV cancelled  3/8- Echo- EF 63%-  Normal left atrium.  LA volume index = 31 cc/m2. Normal left ventricular internal dimensions and wall thicknesses. Endocardium not well visualized; grossly normal left ventricular systolic function. The right ventricle is not well visualized.  Estimated pulmonary artery systolic pressure equals 35 mm Hg, assuming right atrial pressure equals 10  mm Hg, consistent with borderline pulmonary hypertension.    3/8 CATH: pLCx 95 treated with successful resolute stent; LIMA to LAD patent, SVG to Diag patent; 2grafts down; RFA angioseal closure device  3/9- As per Cardiology, stop aspirin in 1 month.    3/9- As per attending, patient stable for discharge. 87 female pmh Glaucoma, HLD, HTN, CAD s/p CABG and stents p/w CP and new onset Afib  EKG Afib  @ 106, TWI V5-6, II, aVL  Repeat EKG: SB @ 54, TWI V4 - V6, AVL, AVF  CE#1: Trop neg, CKMB 7.57,   CE#2: Trop 0.07, CKMB 10.94,   CE#3: Trop 0.14   CKMB 9.30      ProBNP- 1114  CXR Clear  House cards - start metoprolol 12.5mg BID, if rate not controlled start Dig 0.125mg daily, start heparin drip, NPO p MN for possible YARITZA/DCCV in AM, c/w Plavix, trend CE, TTE   3/8- Patient converted back to SR - Sinus Philip- YARITZA/ DCCV cancelled  3/8- Echo- EF 63%-  Normal left atrium.  LA volume index = 31 cc/m2. Normal left ventricular internal dimensions and wall thicknesses. Endocardium not well visualized; grossly normal left ventricular systolic function. The right ventricle is not well visualized.  Estimated pulmonary artery systolic pressure equals 35 mm Hg, assuming right atrial pressure equals 10  mm Hg, consistent with borderline pulmonary hypertension.    3/8 CATH: pLCx 95 treated with successful resolute stent; LIMA to LAD patent, SVG to Diag patent; 2grafts down; RFA angioseal closure device  3/9- As per Cardiology, stop aspirin in 1 month.    3/9- As per attending, patient stable for discharge.   Spoke to Dr Goldberg, Eliquis will be given to patient for now.

## 2017-03-09 NOTE — DISCHARGE NOTE ADULT - CARE PROVIDER_API CALL
Porfirio Hensley (MD), [place] [address]  Phone: [phone]  Fax: [phone]    Dr Goldberg - Cardiology,   Phone: (   )    -  Fax: (   )    - Porfirio Hensley (MD), Medicine  [address] [place] [address]  Phone: [phone]  Fax: [phone]    Goldberg, Steven M (MD), Cardiovascular Disease; Internal Medicine  [address] [place] [address]  Phone: [phone]  Fax: [phone]

## 2017-03-09 NOTE — DISCHARGE NOTE ADULT - PATIENT PORTAL LINK FT
“You can access the [facility] Patient Portal, offered by [facility], by registering with the following website: [email or web address]”

## 2017-04-03 ENCOUNTER — INPATIENT (INPATIENT)
Facility: HOSPITAL | Age: 82
LOS: 1 days | Discharge: HOME | End: 2017-04-05
Attending: INTERNAL MEDICINE | Admitting: INTERNAL MEDICINE
Payer: MEDICARE

## 2017-04-03 VITALS
DIASTOLIC BLOOD PRESSURE: 69 MMHG | OXYGEN SATURATION: 100 % | HEART RATE: 68 BPM | TEMPERATURE: 98 F | SYSTOLIC BLOOD PRESSURE: 177 MMHG | RESPIRATION RATE: 18 BRPM

## 2017-04-03 DIAGNOSIS — M25.569 PAIN IN UNSPECIFIED KNEE: ICD-10-CM

## 2017-04-03 LAB
ALBUMIN SERPL ELPH-MCNC: 4.3 G/DL — SIGNIFICANT CHANGE UP (ref 3.3–5)
ALP SERPL-CCNC: 92 U/L — SIGNIFICANT CHANGE UP (ref 40–120)
ALT FLD-CCNC: 11 U/L — SIGNIFICANT CHANGE UP (ref 4–33)
APTT BLD: 36.9 SEC — SIGNIFICANT CHANGE UP (ref 27.5–37.4)
AST SERPL-CCNC: 21 U/L — SIGNIFICANT CHANGE UP (ref 4–32)
BASOPHILS # BLD AUTO: 0 K/UL — SIGNIFICANT CHANGE UP (ref 0–0.2)
BASOPHILS NFR BLD AUTO: 0 % — SIGNIFICANT CHANGE UP (ref 0–2)
BILIRUB SERPL-MCNC: 0.4 MG/DL — SIGNIFICANT CHANGE UP (ref 0.2–1.2)
BODY FLUID TYPE: SIGNIFICANT CHANGE UP
BUN SERPL-MCNC: 15 MG/DL — SIGNIFICANT CHANGE UP (ref 7–23)
CALCIUM SERPL-MCNC: 10.3 MG/DL — SIGNIFICANT CHANGE UP (ref 8.4–10.5)
CHLORIDE SERPL-SCNC: 104 MMOL/L — SIGNIFICANT CHANGE UP (ref 98–107)
CLARITY SPEC: SIGNIFICANT CHANGE UP
CO2 SERPL-SCNC: 25 MMOL/L — SIGNIFICANT CHANGE UP (ref 22–31)
COLOR FLD: SIGNIFICANT CHANGE UP
CREAT SERPL-MCNC: 0.81 MG/DL — SIGNIFICANT CHANGE UP (ref 0.5–1.3)
CRP SERPL-MCNC: 7.7 MG/L — HIGH (ref 0.3–5)
CRYSTALS FLD MICRO: SIGNIFICANT CHANGE UP
EOSINOPHIL # BLD AUTO: 0.05 K/UL — SIGNIFICANT CHANGE UP (ref 0–0.5)
EOSINOPHIL NFR BLD AUTO: 0.9 % — SIGNIFICANT CHANGE UP (ref 0–6)
ERYTHROCYTE [SEDIMENTATION RATE] IN BLOOD: 39 MM/HR — HIGH (ref 4–25)
GLUCOSE FLD-MCNC: 109 MG/DL — SIGNIFICANT CHANGE UP
GLUCOSE SERPL-MCNC: 92 MG/DL — SIGNIFICANT CHANGE UP (ref 70–99)
HCT VFR BLD CALC: 39.1 % — SIGNIFICANT CHANGE UP (ref 34.5–45)
HGB BLD-MCNC: 12.3 G/DL — SIGNIFICANT CHANGE UP (ref 11.5–15.5)
IMM GRANULOCYTES NFR BLD AUTO: 0 % — SIGNIFICANT CHANGE UP (ref 0–1.5)
INR BLD: 1.2 — HIGH (ref 0.88–1.17)
LYMPHOCYTES # BLD AUTO: 1.51 K/UL — SIGNIFICANT CHANGE UP (ref 1–3.3)
LYMPHOCYTES # BLD AUTO: 27.1 % — SIGNIFICANT CHANGE UP (ref 13–44)
LYMPHOCYTES NFR FLD: 5 % — SIGNIFICANT CHANGE UP
MCHC RBC-ENTMCNC: 28 PG — SIGNIFICANT CHANGE UP (ref 27–34)
MCHC RBC-ENTMCNC: 31.5 % — LOW (ref 32–36)
MCV RBC AUTO: 88.9 FL — SIGNIFICANT CHANGE UP (ref 80–100)
MONOCYTES # BLD AUTO: 0.33 K/UL — SIGNIFICANT CHANGE UP (ref 0–0.9)
MONOCYTES # FLD: 93 % — SIGNIFICANT CHANGE UP
MONOCYTES NFR BLD AUTO: 5.9 % — SIGNIFICANT CHANGE UP (ref 2–14)
NEUTROPHILS # BLD AUTO: 3.69 K/UL — SIGNIFICANT CHANGE UP (ref 1.8–7.4)
NEUTROPHILS NFR BLD AUTO: 66.1 % — SIGNIFICANT CHANGE UP (ref 43–77)
NEUTS SEG NFR FLD MANUAL: 2 % — SIGNIFICANT CHANGE UP
PLATELET # BLD AUTO: 249 K/UL — SIGNIFICANT CHANGE UP (ref 150–400)
PMV BLD: 10.6 FL — SIGNIFICANT CHANGE UP (ref 7–13)
POTASSIUM SERPL-MCNC: 4.2 MMOL/L — SIGNIFICANT CHANGE UP (ref 3.5–5.3)
POTASSIUM SERPL-SCNC: 4.2 MMOL/L — SIGNIFICANT CHANGE UP (ref 3.5–5.3)
PROT FLD-MCNC: 2.9 G/DL — SIGNIFICANT CHANGE UP
PROT SERPL-MCNC: 8 G/DL — SIGNIFICANT CHANGE UP (ref 6–8.3)
PROTHROM AB SERPL-ACNC: 13.5 SEC — HIGH (ref 9.8–13.1)
RBC # BLD: 4.4 M/UL — SIGNIFICANT CHANGE UP (ref 3.8–5.2)
RBC # FLD: 14.4 % — SIGNIFICANT CHANGE UP (ref 10.3–14.5)
RCV VOL RI: HIGH CELL/UL (ref 0–5)
SODIUM SERPL-SCNC: 141 MMOL/L — SIGNIFICANT CHANGE UP (ref 135–145)
TOTAL CELLS COUNTED, BODY FLUID: 100 CELLS — SIGNIFICANT CHANGE UP
TOTAL NUCLEATED CELL COUNT, BODY FLUID: 701 CELL/UL — HIGH (ref 0–5)
WBC # BLD: 5.58 K/UL — SIGNIFICANT CHANGE UP (ref 3.8–10.5)
WBC # FLD AUTO: 5.58 K/UL — SIGNIFICANT CHANGE UP (ref 3.8–10.5)

## 2017-04-03 PROCEDURE — 93971 EXTREMITY STUDY: CPT | Mod: 26,LT

## 2017-04-03 PROCEDURE — 99223 1ST HOSP IP/OBS HIGH 75: CPT

## 2017-04-03 PROCEDURE — 73564 X-RAY EXAM KNEE 4 OR MORE: CPT | Mod: 26,RT

## 2017-04-03 RX ORDER — ACETAMINOPHEN 500 MG
650 TABLET ORAL ONCE
Qty: 0 | Refills: 0 | Status: COMPLETED | OUTPATIENT
Start: 2017-04-03 | End: 2017-04-03

## 2017-04-03 RX ADMIN — Medication 650 MILLIGRAM(S): at 19:29

## 2017-04-03 RX ADMIN — Medication 650 MILLIGRAM(S): at 16:39

## 2017-04-03 NOTE — ED PROVIDER NOTE - MEDICAL DECISION MAKING DETAILS
87F with R leg pain/swelling. Pain with passive ROM of R knee. Ddx: DVT, R knee septic arthritis. No signs of fracture or cellulitis. Plan: labs, LE duplex, R knee xray (and possible arthrocentesis), pain control PRN, reassess. 87F with R leg pain/swelling. Pain with passive ROM of R knee. Ddx: DVT, R knee septic arthritis. No signs of fracture or cellulitis. Plan: labs, LE duplex, R knee xray (and possible arthrocentesis), pain control PRN, reassess.  Lukas: rt knee pain with motion.  Recent catheterization. No fever. no redness. ++warmth. good pulses. DVT possible, joint infection possible gout possible.

## 2017-04-03 NOTE — ED PROVIDER NOTE - PROGRESS NOTE DETAILS
Have now attempted to contact HIP doc x3 in the past hour. Will attempt 1 more time, if no response, will admit pt to Hospitalist. Synovial fluid analysis consistent with inflammatory process, not infection (total nucleated cells = 701). Crystals pending; called lab who state they are working on the result. Discussed results with pt. Pt states she is unable to ambulate, too weak to use crutches, and doesn't feel comfortable going home as her house has many stairs. Will admit for pain control and failure to ambulate.

## 2017-04-03 NOTE — ED ADULT NURSE NOTE - OBJECTIVE STATEMENT
Break coverage RN received pt to spot 9 A&Ox4 daughter at bedside c/o right lower extremity, pain, and inability to ambulate. Pt reports recent admission r/t CP and sent for angiocath. Pt reports hx of afib and HTN, denies DM, denies any trauma to the leg, bilateral pedal pulses present, equal and nonbounding. Appears uncomfortable on assessment, IV placed, labs sent, VS as noted, MD at bedside, will reassess.

## 2017-04-03 NOTE — ED PROVIDER NOTE - OBJECTIVE STATEMENT
87F h/o HTN, HLD, CAD s/p coronary bypass, s/p stent placement 1 month ago by Dr Nora Downey p/w R leg pain/swelling since yesterday. Pt unable to move knee or bear weight due to pain. No fever, rash, or recent injury. No hx of DVT/PE. Pt has not taken anything for pain so far today. No CP, SOB, cough, N/V/D, or abd pain.

## 2017-04-03 NOTE — ED ADULT TRIAGE NOTE - CHIEF COMPLAINT QUOTE
pt BIBA from home, pt c/o right leg swelling and pain since last night.  pt has a stent in her right leg.

## 2017-04-03 NOTE — ED PROVIDER NOTE - EXTREMITY EXAM
right lower extremity findings/warmth around R knee, no bony tenderness or deformity, pain with passive ROM of R knee, R leg > L leg, no redness/cellulitis

## 2017-04-03 NOTE — ED PROVIDER NOTE - ATTENDING CONTRIBUTION TO CARE
I performed a history and physical exam of the patient and discussed their management with the resident. I reviewed the resident's note and agree with the documented findings and plan of care. My medical decison making and observations are found above.  Rt leg pain and warmth. no skin redness.

## 2017-04-04 DIAGNOSIS — I48.91 UNSPECIFIED ATRIAL FIBRILLATION: ICD-10-CM

## 2017-04-04 DIAGNOSIS — I25.10 ATHEROSCLEROTIC HEART DISEASE OF NATIVE CORONARY ARTERY WITHOUT ANGINA PECTORIS: ICD-10-CM

## 2017-04-04 DIAGNOSIS — Z41.8 ENCOUNTER FOR OTHER PROCEDURES FOR PURPOSES OTHER THAN REMEDYING HEALTH STATE: ICD-10-CM

## 2017-04-04 DIAGNOSIS — Z98.891 HISTORY OF UTERINE SCAR FROM PREVIOUS SURGERY: Chronic | ICD-10-CM

## 2017-04-04 DIAGNOSIS — R26.2 DIFFICULTY IN WALKING, NOT ELSEWHERE CLASSIFIED: ICD-10-CM

## 2017-04-04 DIAGNOSIS — E78.5 HYPERLIPIDEMIA, UNSPECIFIED: ICD-10-CM

## 2017-04-04 DIAGNOSIS — M25.561 PAIN IN RIGHT KNEE: ICD-10-CM

## 2017-04-04 DIAGNOSIS — Z95.1 PRESENCE OF AORTOCORONARY BYPASS GRAFT: Chronic | ICD-10-CM

## 2017-04-04 LAB
BASOPHILS # BLD AUTO: 0.01 K/UL — SIGNIFICANT CHANGE UP (ref 0–0.2)
BASOPHILS NFR BLD AUTO: 0.2 % — SIGNIFICANT CHANGE UP (ref 0–2)
BUN SERPL-MCNC: 19 MG/DL — SIGNIFICANT CHANGE UP (ref 7–23)
CALCIUM SERPL-MCNC: 9.9 MG/DL — SIGNIFICANT CHANGE UP (ref 8.4–10.5)
CHLORIDE SERPL-SCNC: 106 MMOL/L — SIGNIFICANT CHANGE UP (ref 98–107)
CO2 SERPL-SCNC: 20 MMOL/L — LOW (ref 22–31)
CREAT SERPL-MCNC: 0.78 MG/DL — SIGNIFICANT CHANGE UP (ref 0.5–1.3)
EOSINOPHIL # BLD AUTO: 0.06 K/UL — SIGNIFICANT CHANGE UP (ref 0–0.5)
EOSINOPHIL NFR BLD AUTO: 1.4 % — SIGNIFICANT CHANGE UP (ref 0–6)
GLUCOSE SERPL-MCNC: 86 MG/DL — SIGNIFICANT CHANGE UP (ref 70–99)
GRAM STN FLD: SIGNIFICANT CHANGE UP
HCT VFR BLD CALC: 36.8 % — SIGNIFICANT CHANGE UP (ref 34.5–45)
HGB BLD-MCNC: 11.6 G/DL — SIGNIFICANT CHANGE UP (ref 11.5–15.5)
IMM GRANULOCYTES NFR BLD AUTO: 0.2 % — SIGNIFICANT CHANGE UP (ref 0–1.5)
LYMPHOCYTES # BLD AUTO: 1.62 K/UL — SIGNIFICANT CHANGE UP (ref 1–3.3)
LYMPHOCYTES # BLD AUTO: 38.6 % — SIGNIFICANT CHANGE UP (ref 13–44)
MAGNESIUM SERPL-MCNC: 2.4 MG/DL — SIGNIFICANT CHANGE UP (ref 1.6–2.6)
MCHC RBC-ENTMCNC: 28.1 PG — SIGNIFICANT CHANGE UP (ref 27–34)
MCHC RBC-ENTMCNC: 31.5 % — LOW (ref 32–36)
MCV RBC AUTO: 89.1 FL — SIGNIFICANT CHANGE UP (ref 80–100)
MONOCYTES # BLD AUTO: 0.28 K/UL — SIGNIFICANT CHANGE UP (ref 0–0.9)
MONOCYTES NFR BLD AUTO: 6.7 % — SIGNIFICANT CHANGE UP (ref 2–14)
NEUTROPHILS # BLD AUTO: 2.22 K/UL — SIGNIFICANT CHANGE UP (ref 1.8–7.4)
NEUTROPHILS NFR BLD AUTO: 52.9 % — SIGNIFICANT CHANGE UP (ref 43–77)
PHOSPHATE SERPL-MCNC: 4.7 MG/DL — HIGH (ref 2.5–4.5)
PLATELET # BLD AUTO: 241 K/UL — SIGNIFICANT CHANGE UP (ref 150–400)
PMV BLD: 11.2 FL — SIGNIFICANT CHANGE UP (ref 7–13)
POTASSIUM SERPL-MCNC: 3.9 MMOL/L — SIGNIFICANT CHANGE UP (ref 3.5–5.3)
POTASSIUM SERPL-SCNC: 3.9 MMOL/L — SIGNIFICANT CHANGE UP (ref 3.5–5.3)
RBC # BLD: 4.13 M/UL — SIGNIFICANT CHANGE UP (ref 3.8–5.2)
RBC # FLD: 14.7 % — HIGH (ref 10.3–14.5)
SODIUM SERPL-SCNC: 142 MMOL/L — SIGNIFICANT CHANGE UP (ref 135–145)
SPECIMEN SOURCE: SIGNIFICANT CHANGE UP
WBC # BLD: 4.2 K/UL — SIGNIFICANT CHANGE UP (ref 3.8–10.5)
WBC # FLD AUTO: 4.2 K/UL — SIGNIFICANT CHANGE UP (ref 3.8–10.5)

## 2017-04-04 PROCEDURE — 93010 ELECTROCARDIOGRAM REPORT: CPT

## 2017-04-04 RX ORDER — LIDOCAINE 4 G/100G
1 CREAM TOPICAL DAILY
Qty: 0 | Refills: 0 | Status: DISCONTINUED | OUTPATIENT
Start: 2017-04-04 | End: 2017-04-05

## 2017-04-04 RX ORDER — APIXABAN 2.5 MG/1
2.5 TABLET, FILM COATED ORAL
Qty: 0 | Refills: 0 | Status: DISCONTINUED | OUTPATIENT
Start: 2017-04-04 | End: 2017-04-05

## 2017-04-04 RX ORDER — METOPROLOL TARTRATE 50 MG
12.5 TABLET ORAL
Qty: 0 | Refills: 0 | Status: DISCONTINUED | OUTPATIENT
Start: 2017-04-04 | End: 2017-04-05

## 2017-04-04 RX ORDER — ONDANSETRON 8 MG/1
4 TABLET, FILM COATED ORAL EVERY 6 HOURS
Qty: 0 | Refills: 0 | Status: COMPLETED | OUTPATIENT
Start: 2017-04-04 | End: 2017-04-04

## 2017-04-04 RX ORDER — ASPIRIN/CALCIUM CARB/MAGNESIUM 324 MG
81 TABLET ORAL DAILY
Qty: 0 | Refills: 0 | Status: DISCONTINUED | OUTPATIENT
Start: 2017-04-04 | End: 2017-04-05

## 2017-04-04 RX ORDER — CLOPIDOGREL BISULFATE 75 MG/1
75 TABLET, FILM COATED ORAL DAILY
Qty: 0 | Refills: 0 | Status: DISCONTINUED | OUTPATIENT
Start: 2017-04-04 | End: 2017-04-05

## 2017-04-04 RX ORDER — TRAMADOL HYDROCHLORIDE 50 MG/1
50 TABLET ORAL EVERY 6 HOURS
Qty: 0 | Refills: 0 | Status: DISCONTINUED | OUTPATIENT
Start: 2017-04-04 | End: 2017-04-05

## 2017-04-04 RX ORDER — ACETAMINOPHEN 500 MG
650 TABLET ORAL EVERY 6 HOURS
Qty: 0 | Refills: 0 | Status: DISCONTINUED | OUTPATIENT
Start: 2017-04-04 | End: 2017-04-05

## 2017-04-04 RX ORDER — ATORVASTATIN CALCIUM 80 MG/1
20 TABLET, FILM COATED ORAL AT BEDTIME
Qty: 0 | Refills: 0 | Status: DISCONTINUED | OUTPATIENT
Start: 2017-04-04 | End: 2017-04-05

## 2017-04-04 RX ADMIN — Medication 650 MILLIGRAM(S): at 01:38

## 2017-04-04 RX ADMIN — ONDANSETRON 4 MILLIGRAM(S): 8 TABLET, FILM COATED ORAL at 13:31

## 2017-04-04 RX ADMIN — APIXABAN 2.5 MILLIGRAM(S): 2.5 TABLET, FILM COATED ORAL at 17:07

## 2017-04-04 RX ADMIN — ATORVASTATIN CALCIUM 20 MILLIGRAM(S): 80 TABLET, FILM COATED ORAL at 21:00

## 2017-04-04 RX ADMIN — Medication 650 MILLIGRAM(S): at 14:37

## 2017-04-04 RX ADMIN — Medication 650 MILLIGRAM(S): at 19:32

## 2017-04-04 RX ADMIN — Medication 650 MILLIGRAM(S): at 20:20

## 2017-04-04 RX ADMIN — Medication 650 MILLIGRAM(S): at 09:37

## 2017-04-04 RX ADMIN — LIDOCAINE 1 PATCH: 4 CREAM TOPICAL at 17:09

## 2017-04-04 RX ADMIN — APIXABAN 2.5 MILLIGRAM(S): 2.5 TABLET, FILM COATED ORAL at 06:16

## 2017-04-04 RX ADMIN — Medication 81 MILLIGRAM(S): at 12:09

## 2017-04-04 RX ADMIN — Medication 650 MILLIGRAM(S): at 08:36

## 2017-04-04 RX ADMIN — Medication 650 MILLIGRAM(S): at 15:37

## 2017-04-04 RX ADMIN — Medication 650 MILLIGRAM(S): at 02:30

## 2017-04-04 RX ADMIN — TRAMADOL HYDROCHLORIDE 50 MILLIGRAM(S): 50 TABLET ORAL at 06:15

## 2017-04-04 RX ADMIN — CLOPIDOGREL BISULFATE 75 MILLIGRAM(S): 75 TABLET, FILM COATED ORAL at 12:09

## 2017-04-04 NOTE — PHYSICAL THERAPY INITIAL EVALUATION ADULT - PERTINENT HX OF CURRENT PROBLEM, REHAB EVAL
PCI (1 month ago at [facility]) who presents to the hospital with complaints of acute right leg pain and an inability to walk; Said that since her discharge about 1 month ago, she has felt weak and has been unable to ambulate properly

## 2017-04-04 NOTE — H&P ADULT. - MUSCULOSKELETAL COMMENTS
unable to bend R knee 2/2 pain, unable to bend it passively either, no warmth or R knee, no erythema of R knee, no pain on palpation of R knee, no swelling of R knee, pain on palpation of R calf

## 2017-04-04 NOTE — H&P ADULT. - PROBLEM SELECTOR PLAN 1
- Unclear etiology for the patient's R knee pain, no known traumatic event  - Possibly related to change in patient's gait from her deconditioning  - Would start the patient on tylenol/tramadol prn, will obtain PT eval  - OOB to chair  - If pain not improving or worsening then consider MRI to evaluate for tendon injury

## 2017-04-04 NOTE — H&P ADULT. - LAB RESULTS AND INTERPRETATION
CBC - elevated ESR, otherwise wnl  CMP - elevated CRP, otherwise wnl  Coags - mild coagulopathy  Arthrocentesis - total nucleated cells 701, seg count 2%, crystal neg, gram stain neg

## 2017-04-04 NOTE — H&P ADULT. - PROBLEM SELECTOR PLAN 5
- c/w eliquis for now, patient states that she is unable to afford the eliquis medication in future, discuss with cardiology on changing patient to coumadin for cost effectiveness

## 2017-04-04 NOTE — H&P ADULT. - PSH
H/O:  section  x2  H/O: Hysterectomy    History of Cardiac Cath    History of Cardiac Cath    S/P CABG (coronary artery bypass graft)    S/P Hysterectomy

## 2017-04-04 NOTE — H&P ADULT. - FAMILY HISTORY
Family history of hypertension     Child  Still living? Unknown  Family history of coronary artery disease in son, Age at diagnosis: Age Unknown <<-----Click on this checkbox to enter Family History

## 2017-04-04 NOTE — H&P ADULT. - HISTORY OF PRESENT ILLNESS
This is an 87F with history of CAD s/p CABG and recent PCI (1 month ago at [facility]), HLD, and new AFib (on eliquis) who presents to the hospital with complaints of acute R leg pain and an inability to walk for the past 1 day. Said that since his discharge about 1 month ago, she has felt weak and has been unable to ambulate properly, said that she feels like she is taking smaller steps than usual and is shuffling more. Denied having any joint pains associated with this gait but 1 day PTA she started to have severe R leg pain, said that the pain seems to be centered around her R knee, said that the pain is a 10/10 on the pain scale, describes it as a aching pain when resting and a sharp pain when trying to bend her knee and when trying to ambulate. Said that she did not take any medications to help with the pain. Denied any trauma to her knee, denied any twisting motions to her knee prior to the onset of her pain. Denies any fevers, chills, or diaphoresis, denies any other complaints.    In the ED, her vitals were T 98.1, P 68, /69, R 18, O2 sat 100% RA. Her lab work was significant for mildly elevated ESR and CRP. She had an arthrocentesis in the ED that showed inflammatory changes in her R knee. She had imaging of her R knee and DVT study which were negative for any acute findings. She was given tylenol 650mg PO x1 and admitted to medicine.

## 2017-04-04 NOTE — H&P ADULT. - RADIOLOGY RESULTS AND INTERPRETATION
R knee XR -  mild  medial  compartment  joint  space  narrowing, no acute fracture  US Duplex Venous RLE - no RLE DVT

## 2017-04-04 NOTE — H&P ADULT. - RS GEN PE MLT RESP DETAILS PC
airway patent/no wheezes/respirations non-labored/breath sounds equal/no rhonchi/clear to auscultation bilaterally/good air movement/no rales

## 2017-04-04 NOTE — H&P ADULT. - ASSESSMENT
This is an 87F with history of CAD, HLD, and AFib who presents with acute onset R knee pain and inability to ambulate.

## 2017-04-04 NOTE — H&P ADULT. - MUSCULOSKELETAL
details… detailed exam no joint warmth/no joint swelling/normal strength/decreased ROM due to pain/no joint erythema

## 2017-04-05 VITALS
RESPIRATION RATE: 18 BRPM | TEMPERATURE: 98 F | HEART RATE: 57 BPM | DIASTOLIC BLOOD PRESSURE: 71 MMHG | SYSTOLIC BLOOD PRESSURE: 143 MMHG | OXYGEN SATURATION: 97 %

## 2017-04-05 LAB
BUN SERPL-MCNC: 19 MG/DL — SIGNIFICANT CHANGE UP (ref 7–23)
CALCIUM SERPL-MCNC: 9.7 MG/DL — SIGNIFICANT CHANGE UP (ref 8.4–10.5)
CHLORIDE SERPL-SCNC: 103 MMOL/L — SIGNIFICANT CHANGE UP (ref 98–107)
CO2 SERPL-SCNC: 26 MMOL/L — SIGNIFICANT CHANGE UP (ref 22–31)
CREAT SERPL-MCNC: 0.78 MG/DL — SIGNIFICANT CHANGE UP (ref 0.5–1.3)
GLUCOSE SERPL-MCNC: 88 MG/DL — SIGNIFICANT CHANGE UP (ref 70–99)
HCT VFR BLD CALC: 35.9 % — SIGNIFICANT CHANGE UP (ref 34.5–45)
HGB BLD-MCNC: 11.3 G/DL — LOW (ref 11.5–15.5)
MAGNESIUM SERPL-MCNC: 2.1 MG/DL — SIGNIFICANT CHANGE UP (ref 1.6–2.6)
MCHC RBC-ENTMCNC: 27.9 PG — SIGNIFICANT CHANGE UP (ref 27–34)
MCHC RBC-ENTMCNC: 31.5 % — LOW (ref 32–36)
MCV RBC AUTO: 88.6 FL — SIGNIFICANT CHANGE UP (ref 80–100)
PHOSPHATE SERPL-MCNC: 3.8 MG/DL — SIGNIFICANT CHANGE UP (ref 2.5–4.5)
PLATELET # BLD AUTO: 220 K/UL — SIGNIFICANT CHANGE UP (ref 150–400)
PMV BLD: 10.6 FL — SIGNIFICANT CHANGE UP (ref 7–13)
POTASSIUM SERPL-MCNC: 3.8 MMOL/L — SIGNIFICANT CHANGE UP (ref 3.5–5.3)
POTASSIUM SERPL-SCNC: 3.8 MMOL/L — SIGNIFICANT CHANGE UP (ref 3.5–5.3)
RBC # BLD: 4.05 M/UL — SIGNIFICANT CHANGE UP (ref 3.8–5.2)
RBC # FLD: 14.6 % — HIGH (ref 10.3–14.5)
SODIUM SERPL-SCNC: 141 MMOL/L — SIGNIFICANT CHANGE UP (ref 135–145)
WBC # BLD: 4.4 K/UL — SIGNIFICANT CHANGE UP (ref 3.8–10.5)
WBC # FLD AUTO: 4.4 K/UL — SIGNIFICANT CHANGE UP (ref 3.8–10.5)

## 2017-04-05 RX ORDER — ACETAMINOPHEN 500 MG
2 TABLET ORAL
Qty: 80 | Refills: 0
Start: 2017-04-05 | End: 2017-04-15

## 2017-04-05 RX ORDER — LIDOCAINE 4 G/100G
1 CREAM TOPICAL
Qty: 5 | Refills: 0
Start: 2017-04-05 | End: 2017-04-10

## 2017-04-05 RX ADMIN — APIXABAN 2.5 MILLIGRAM(S): 2.5 TABLET, FILM COATED ORAL at 18:10

## 2017-04-05 RX ADMIN — Medication 650 MILLIGRAM(S): at 18:09

## 2017-04-05 RX ADMIN — APIXABAN 2.5 MILLIGRAM(S): 2.5 TABLET, FILM COATED ORAL at 05:54

## 2017-04-05 RX ADMIN — Medication 650 MILLIGRAM(S): at 12:23

## 2017-04-05 RX ADMIN — CLOPIDOGREL BISULFATE 75 MILLIGRAM(S): 75 TABLET, FILM COATED ORAL at 11:23

## 2017-04-05 RX ADMIN — Medication 650 MILLIGRAM(S): at 11:23

## 2017-04-05 RX ADMIN — LIDOCAINE 1 PATCH: 4 CREAM TOPICAL at 12:34

## 2017-04-05 RX ADMIN — Medication 81 MILLIGRAM(S): at 11:23

## 2017-04-05 RX ADMIN — LIDOCAINE 1 PATCH: 4 CREAM TOPICAL at 05:52

## 2017-04-05 NOTE — DISCHARGE NOTE ADULT - SECONDARY DIAGNOSIS.
Coronary artery disease involving native coronary artery of native heart without angina pectoris S/P CABG (coronary artery bypass graft) Hyperlipidemia, unspecified hyperlipidemia type Atrial fibrillation, unspecified type Coronary artery disease

## 2017-04-05 NOTE — DISCHARGE NOTE ADULT - PATIENT PORTAL LINK FT
“You can access the [facility] Patient Portal, offered by [facility], by registering with the following website: [email or web address]”

## 2017-04-05 NOTE — DISCHARGE NOTE ADULT - HOSPITAL COURSE
87F with history of CAD, HLD, and AFib who presents with acute onset R knee pain and inability to ambulate.  RIGHT KNEE ACUTE PAIN   - tylenol prn    - PT eval: Rehab - yet patient refused rehab , multiple attempts made to send patient to rehab with patient and patient's family refused. Patient understands consequences of going home instead rehab. Patient going home with home PT  - OOB to chair  - X ray right knee---joint space narrowing  -s/p arthrocentesis in the ED that showed inflammatory changes in her R knee. Cultures neg  -Doppler --negative for DVT     Ortho eval:  - PT/OJ  - WBAT  -pain control  -ace wrap for compression    CAD   - Aspirin and plavix     HYPERLIPIDEMIA   - c/w home lipitor.     AFIB   - c/w eliquis   - metoprolol

## 2017-04-05 NOTE — DISCHARGE NOTE ADULT - CARE PLAN
Principal Discharge DX:	Knee pain  Goal:	resolving  Instructions for follow-up, activity and diet:	Patient with acute knee pain, patient denies any injury. Pain controlled with Tylenol. PT evaluations done recommended rehab yet patient refused rehab. Patient understands clearly the consequences of refusing rehab yet requesting to go home. Ortho evaluations recommended pain management. PT therapy and weight bearing as tolerated with ace wrap for compression.  Secondary Diagnosis:	Coronary artery disease involving native coronary artery of native heart without angina pectoris  Goal:	under good care  Instructions for follow-up, activity and diet:	Please follow up with cardiologist as recommended, please continue your medications as recommended. Please call your doctor immediately if you developed any chest pain, fever or sign and symptoms of bleeding.  Secondary Diagnosis:	S/P CABG (coronary artery bypass graft)  Goal:	under good care  Instructions for follow-up, activity and diet:	Please follow up with cardiologist as recommended, please continue your medications as recommended. Please call your doctor immediately if you developed any chest pain, fever or sign and symptoms of bleeding.  Secondary Diagnosis:	Hyperlipidemia, unspecified hyperlipidemia type  Goal:	well controlled  Instructions for follow-up, activity and diet:	Please continue your medications as recommended, please follow up with cardiologist and family doctor as recommended.  Secondary Diagnosis:	Atrial fibrillation, unspecified type  Goal:	Hear rate well controlled  Instructions for follow-up, activity and diet:	Please follow up with cardiologist as recommended, please continue your medications as recommended.  Please monitor for bleeding and call your doctor immediately if you noticed any bleeding.  Secondary Diagnosis:	Coronary artery disease  Goal:	well controlled  Instructions for follow-up, activity and diet:	Please follow up with cardiologist as recommended, please continue your medications as recommended. Please call your doctor immediately if you developed any chest pain, fever or sign and symptoms of bleeding.

## 2017-04-05 NOTE — DISCHARGE NOTE ADULT - PLAN OF CARE
resolving Patient with acute knee pain, patient denies any injury. Pain controlled with Tylenol. PT evaluations done recommended rehab yet patient refused rehab. Patient understands clearly the consequences of refusing rehab yet requesting to go home. Ortho evaluations recommended pain management. PT therapy and weight bearing as tolerated with ace wrap for compression. under good care Please follow up with cardiologist as recommended, please continue your medications as recommended. Please call your doctor immediately if you developed any chest pain, fever or sign and symptoms of bleeding. well controlled Please continue your medications as recommended, please follow up with cardiologist and family doctor as recommended. Please follow up with cardiologist as recommended, please continue your medications as recommended.  Please monitor for bleeding and call your doctor immediately if you noticed any bleeding. Hear rate well controlled

## 2017-04-05 NOTE — DISCHARGE NOTE ADULT - MEDICATION SUMMARY - MEDICATIONS TO TAKE
I will START or STAY ON the medications listed below when I get home from the hospital:    aspirin 81 mg oral delayed release tablet  -- 1 tab(s) by mouth once a day stop in 1 month as per cardiology 4/9/17.  -- Indication: For Coronary artery disease involving native coronary artery of native heart without angina pectoris    acetaminophen 325 mg oral tablet  -- 2 tab(s) by mouth every 6 hours, As needed, Mild to moderate pain  -- Indication: For Knee pain    Eliquis 2.5 mg oral tablet  -- 1 tab(s) by mouth 2 times a day  -- Check with your doctor before becoming pregnant.  It is very important that you take or use this exactly as directed.  Do not skip doses or discontinue unless directed by your doctor.  Obtain medical advice before taking any non-prescription drugs as some may affect the action of this medication.    -- Indication: For Atrial fibrillation, unspecified type    atorvastatin 20 mg oral tablet  -- 1 tab(s) by mouth once a day (at bedtime)  -- Indication: For Hyperlipidemia, unspecified hyperlipidemia type    clopidogrel 75 mg oral tablet  -- 1 tab(s) by mouth once a day  -- Indication: For S/P CABG (coronary artery bypass graft)    metoprolol tartrate 25 mg oral tablet  -- 0.5 tab(s) by mouth 2 times a day  -- Indication: For Atrial fibrillation, unspecified type    lidocaine 5% topical film  -- Apply on skin to affected area once a day  -- Indication: For Knee pain    Vitamin D3 1000 intl units oral capsule  -- 1 cap(s) by mouth once a day  -- Indication: For Supplement

## 2017-04-05 NOTE — DISCHARGE NOTE ADULT - HOME CARE AGENCY
[facility]  RN to see patient 4/6/2017 once insurance authorization and copay information obtained.

## 2017-04-09 LAB — BACTERIA FLD CULT: SIGNIFICANT CHANGE UP

## 2019-01-21 NOTE — H&P ADULT. - CARDIOVASCULAR DETAILS
Consent (Marginal Mandibular)/Introductory Paragraph: The rationale for Mohs was explained to the patient and consent was obtained. The risks, benefits and alternatives to therapy were discussed in detail. Specifically, the risks of damage to the marginal mandibular branch of the facial nerve, infection, scarring, bleeding, prolonged wound healing, incomplete removal, allergy to anesthesia, and recurrence were addressed. Prior to the procedure, the treatment site was clearly identified and confirmed by the patient. All components of Universal Protocol/PAUSE Rule completed. positive S1/positive S2

## 2020-06-01 NOTE — ED ADULT TRIAGE NOTE - MEANS OF ARRIVAL
Patient had placement of a right internal jugular temporary dialysis catheter on May 28, 2020, for pheresis as well as hemodialysis.  They are now requesting a tunneled dialysis catheter.    We will place this in the operating room tomorrow.  Preop orders in place.   stretcher

## 2021-04-16 NOTE — DISCHARGE NOTE ADULT - ADDITIONAL INSTRUCTIONS
normal... Follow up with Cardiologist and PMD within one week of discharge. Call for appointment. Return to ED for any concerning symptoms. Continue medications as prescribed. Low salt, low fat, low cholesterol diet. No heavy lifting x one week. No strenuous activity x 3 weeks. Monitor site of procedure and notify your doctor for any redness, swelling, discharge. No driving x 24 hours. You may shower but no baths or swimming x one week.  Stop aspirin in 1 month. Continue with all other meds.

## 2021-11-06 ENCOUNTER — INPATIENT (INPATIENT)
Facility: HOSPITAL | Age: 86
LOS: 2 days | Discharge: HOME | DRG: 153 | End: 2021-11-09
Attending: INTERNAL MEDICINE | Admitting: INTERNAL MEDICINE
Payer: MEDICARE

## 2021-11-06 VITALS
TEMPERATURE: 98 F | RESPIRATION RATE: 18 BRPM | HEART RATE: 78 BPM | OXYGEN SATURATION: 97 % | HEIGHT: 60 IN | WEIGHT: 160.06 LBS | DIASTOLIC BLOOD PRESSURE: 76 MMHG | SYSTOLIC BLOOD PRESSURE: 130 MMHG

## 2021-11-06 DIAGNOSIS — I25.10 ATHEROSCLEROTIC HEART DISEASE OF NATIVE CORONARY ARTERY WITHOUT ANGINA PECTORIS: ICD-10-CM

## 2021-11-06 DIAGNOSIS — I48.20 CHRONIC ATRIAL FIBRILLATION, UNSPECIFIED: ICD-10-CM

## 2021-11-06 DIAGNOSIS — Z98.891 HISTORY OF UTERINE SCAR FROM PREVIOUS SURGERY: Chronic | ICD-10-CM

## 2021-11-06 DIAGNOSIS — Z95.1 PRESENCE OF AORTOCORONARY BYPASS GRAFT: Chronic | ICD-10-CM

## 2021-11-06 DIAGNOSIS — J18.9 PNEUMONIA, UNSPECIFIED ORGANISM: ICD-10-CM

## 2021-11-06 DIAGNOSIS — R60.0 LOCALIZED EDEMA: ICD-10-CM

## 2021-11-06 DIAGNOSIS — E87.6 HYPOKALEMIA: ICD-10-CM

## 2021-11-06 DIAGNOSIS — Z29.9 ENCOUNTER FOR PROPHYLACTIC MEASURES, UNSPECIFIED: ICD-10-CM

## 2021-11-06 DIAGNOSIS — E87.1 HYPO-OSMOLALITY AND HYPONATREMIA: ICD-10-CM

## 2021-11-06 DIAGNOSIS — B34.8 OTHER VIRAL INFECTIONS OF UNSPECIFIED SITE: ICD-10-CM

## 2021-11-06 PROBLEM — I48.91 UNSPECIFIED ATRIAL FIBRILLATION: Chronic | Status: ACTIVE | Noted: 2017-04-04

## 2021-11-06 LAB
ALBUMIN SERPL ELPH-MCNC: 3.3 G/DL — LOW (ref 3.5–5)
ALP SERPL-CCNC: 70 U/L — SIGNIFICANT CHANGE UP (ref 40–120)
ALT FLD-CCNC: 22 U/L DA — SIGNIFICANT CHANGE UP (ref 10–60)
ANION GAP SERPL CALC-SCNC: 10 MMOL/L — SIGNIFICANT CHANGE UP (ref 5–17)
ANION GAP SERPL CALC-SCNC: 6 MMOL/L — SIGNIFICANT CHANGE UP (ref 5–17)
APPEARANCE UR: CLEAR — SIGNIFICANT CHANGE UP
APTT BLD: 40.4 SEC — HIGH (ref 27.5–35.5)
AST SERPL-CCNC: 41 U/L — HIGH (ref 10–40)
BASOPHILS # BLD AUTO: 0.02 K/UL — SIGNIFICANT CHANGE UP (ref 0–0.2)
BASOPHILS NFR BLD AUTO: 0.3 % — SIGNIFICANT CHANGE UP (ref 0–2)
BILIRUB SERPL-MCNC: 0.6 MG/DL — SIGNIFICANT CHANGE UP (ref 0.2–1.2)
BILIRUB UR-MCNC: NEGATIVE — SIGNIFICANT CHANGE UP
BUN SERPL-MCNC: 26 MG/DL — HIGH (ref 7–18)
BUN SERPL-MCNC: 31 MG/DL — HIGH (ref 7–18)
CALCIUM SERPL-MCNC: 10.1 MG/DL — SIGNIFICANT CHANGE UP (ref 8.4–10.5)
CALCIUM SERPL-MCNC: 9.9 MG/DL — SIGNIFICANT CHANGE UP (ref 8.4–10.5)
CHLORIDE SERPL-SCNC: 83 MMOL/L — LOW (ref 96–108)
CHLORIDE SERPL-SCNC: 83 MMOL/L — LOW (ref 96–108)
CO2 SERPL-SCNC: 35 MMOL/L — HIGH (ref 22–31)
CO2 SERPL-SCNC: 36 MMOL/L — HIGH (ref 22–31)
COLOR SPEC: YELLOW — SIGNIFICANT CHANGE UP
CREAT SERPL-MCNC: 0.99 MG/DL — SIGNIFICANT CHANGE UP (ref 0.5–1.3)
CREAT SERPL-MCNC: 1.15 MG/DL — SIGNIFICANT CHANGE UP (ref 0.5–1.3)
DIFF PNL FLD: NEGATIVE — SIGNIFICANT CHANGE UP
EOSINOPHIL # BLD AUTO: 0.02 K/UL — SIGNIFICANT CHANGE UP (ref 0–0.5)
EOSINOPHIL NFR BLD AUTO: 0.3 % — SIGNIFICANT CHANGE UP (ref 0–6)
GLUCOSE SERPL-MCNC: 133 MG/DL — HIGH (ref 70–99)
GLUCOSE SERPL-MCNC: 95 MG/DL — SIGNIFICANT CHANGE UP (ref 70–99)
GLUCOSE UR QL: NEGATIVE — SIGNIFICANT CHANGE UP
HCT VFR BLD CALC: 35.9 % — SIGNIFICANT CHANGE UP (ref 34.5–45)
HGB BLD-MCNC: 11.8 G/DL — SIGNIFICANT CHANGE UP (ref 11.5–15.5)
IMM GRANULOCYTES NFR BLD AUTO: 0.7 % — SIGNIFICANT CHANGE UP (ref 0–1.5)
INR BLD: 1.62 RATIO — HIGH (ref 0.88–1.16)
KETONES UR-MCNC: NEGATIVE — SIGNIFICANT CHANGE UP
LEUKOCYTE ESTERASE UR-ACNC: NEGATIVE — SIGNIFICANT CHANGE UP
LYMPHOCYTES # BLD AUTO: 0.86 K/UL — LOW (ref 1–3.3)
LYMPHOCYTES # BLD AUTO: 14.5 % — SIGNIFICANT CHANGE UP (ref 13–44)
MAGNESIUM SERPL-MCNC: 2.4 MG/DL — SIGNIFICANT CHANGE UP (ref 1.6–2.6)
MCHC RBC-ENTMCNC: 28.5 PG — SIGNIFICANT CHANGE UP (ref 27–34)
MCHC RBC-ENTMCNC: 32.9 GM/DL — SIGNIFICANT CHANGE UP (ref 32–36)
MCV RBC AUTO: 86.7 FL — SIGNIFICANT CHANGE UP (ref 80–100)
MONOCYTES # BLD AUTO: 0.64 K/UL — SIGNIFICANT CHANGE UP (ref 0–0.9)
MONOCYTES NFR BLD AUTO: 10.8 % — SIGNIFICANT CHANGE UP (ref 2–14)
NEUTROPHILS # BLD AUTO: 4.37 K/UL — SIGNIFICANT CHANGE UP (ref 1.8–7.4)
NEUTROPHILS NFR BLD AUTO: 73.4 % — SIGNIFICANT CHANGE UP (ref 43–77)
NITRITE UR-MCNC: NEGATIVE — SIGNIFICANT CHANGE UP
NRBC # BLD: 0 /100 WBCS — SIGNIFICANT CHANGE UP (ref 0–0)
PH UR: 7 — SIGNIFICANT CHANGE UP (ref 5–8)
PHOSPHATE SERPL-MCNC: 2.1 MG/DL — LOW (ref 2.5–4.5)
PLATELET # BLD AUTO: 320 K/UL — SIGNIFICANT CHANGE UP (ref 150–400)
POTASSIUM SERPL-MCNC: 2.7 MMOL/L — CRITICAL LOW (ref 3.5–5.3)
POTASSIUM SERPL-MCNC: 2.8 MMOL/L — CRITICAL LOW (ref 3.5–5.3)
POTASSIUM SERPL-SCNC: 2.7 MMOL/L — CRITICAL LOW (ref 3.5–5.3)
POTASSIUM SERPL-SCNC: 2.8 MMOL/L — CRITICAL LOW (ref 3.5–5.3)
PROT SERPL-MCNC: 8.5 G/DL — HIGH (ref 6–8.3)
PROT UR-MCNC: 15
PROTHROM AB SERPL-ACNC: 18.8 SEC — HIGH (ref 10.6–13.6)
RAPID RVP RESULT: DETECTED
RBC # BLD: 4.14 M/UL — SIGNIFICANT CHANGE UP (ref 3.8–5.2)
RBC # FLD: 13.4 % — SIGNIFICANT CHANGE UP (ref 10.3–14.5)
RV+EV RNA SPEC QL NAA+PROBE: DETECTED
SARS-COV-2 RNA SPEC QL NAA+PROBE: SIGNIFICANT CHANGE UP
SODIUM SERPL-SCNC: 125 MMOL/L — LOW (ref 135–145)
SODIUM SERPL-SCNC: 128 MMOL/L — LOW (ref 135–145)
SP GR SPEC: 1 — LOW (ref 1.01–1.02)
UROBILINOGEN FLD QL: NEGATIVE — SIGNIFICANT CHANGE UP
WBC # BLD: 5.95 K/UL — SIGNIFICANT CHANGE UP (ref 3.8–10.5)
WBC # FLD AUTO: 5.95 K/UL — SIGNIFICANT CHANGE UP (ref 3.8–10.5)

## 2021-11-06 PROCEDURE — 71046 X-RAY EXAM CHEST 2 VIEWS: CPT | Mod: 26

## 2021-11-06 PROCEDURE — 99285 EMERGENCY DEPT VISIT HI MDM: CPT

## 2021-11-06 RX ORDER — METOPROLOL TARTRATE 50 MG
25 TABLET ORAL DAILY
Refills: 0 | Status: DISCONTINUED | OUTPATIENT
Start: 2021-11-06 | End: 2021-11-09

## 2021-11-06 RX ORDER — CHOLECALCIFEROL (VITAMIN D3) 125 MCG
1 CAPSULE ORAL
Qty: 0 | Refills: 0 | DISCHARGE

## 2021-11-06 RX ORDER — INFLUENZA VIRUS VACCINE 15; 15; 15; 15 UG/.5ML; UG/.5ML; UG/.5ML; UG/.5ML
0.7 SUSPENSION INTRAMUSCULAR ONCE
Refills: 0 | Status: DISCONTINUED | OUTPATIENT
Start: 2021-11-06 | End: 2021-11-09

## 2021-11-06 RX ORDER — ACETAMINOPHEN 500 MG
650 TABLET ORAL EVERY 6 HOURS
Refills: 0 | Status: DISCONTINUED | OUTPATIENT
Start: 2021-11-06 | End: 2021-11-09

## 2021-11-06 RX ORDER — AZITHROMYCIN 500 MG/1
500 TABLET, FILM COATED ORAL ONCE
Refills: 0 | Status: COMPLETED | OUTPATIENT
Start: 2021-11-06 | End: 2021-11-06

## 2021-11-06 RX ORDER — POTASSIUM CHLORIDE 20 MEQ
40 PACKET (EA) ORAL ONCE
Refills: 0 | Status: COMPLETED | OUTPATIENT
Start: 2021-11-06 | End: 2021-11-06

## 2021-11-06 RX ORDER — POTASSIUM CHLORIDE 20 MEQ
10 PACKET (EA) ORAL
Refills: 0 | Status: COMPLETED | OUTPATIENT
Start: 2021-11-06 | End: 2021-11-06

## 2021-11-06 RX ORDER — APIXABAN 2.5 MG/1
2.5 TABLET, FILM COATED ORAL
Refills: 0 | Status: DISCONTINUED | OUTPATIENT
Start: 2021-11-06 | End: 2021-11-09

## 2021-11-06 RX ORDER — AMIODARONE HYDROCHLORIDE 400 MG/1
200 TABLET ORAL
Refills: 0 | Status: DISCONTINUED | OUTPATIENT
Start: 2021-11-06 | End: 2021-11-09

## 2021-11-06 RX ORDER — PANTOPRAZOLE SODIUM 20 MG/1
40 TABLET, DELAYED RELEASE ORAL
Refills: 0 | Status: DISCONTINUED | OUTPATIENT
Start: 2021-11-06 | End: 2021-11-09

## 2021-11-06 RX ORDER — CEFTRIAXONE 500 MG/1
1000 INJECTION, POWDER, FOR SOLUTION INTRAMUSCULAR; INTRAVENOUS ONCE
Refills: 0 | Status: COMPLETED | OUTPATIENT
Start: 2021-11-06 | End: 2021-11-06

## 2021-11-06 RX ORDER — METOPROLOL TARTRATE 50 MG
0.5 TABLET ORAL
Qty: 0 | Refills: 0 | DISCHARGE

## 2021-11-06 RX ORDER — ALBUTEROL 90 UG/1
2 AEROSOL, METERED ORAL EVERY 6 HOURS
Refills: 0 | Status: DISCONTINUED | OUTPATIENT
Start: 2021-11-06 | End: 2021-11-09

## 2021-11-06 RX ADMIN — Medication 200 MILLIGRAM(S): at 23:13

## 2021-11-06 RX ADMIN — Medication 40 MILLIEQUIVALENT(S): at 23:12

## 2021-11-06 RX ADMIN — CEFTRIAXONE 100 MILLIGRAM(S): 500 INJECTION, POWDER, FOR SOLUTION INTRAMUSCULAR; INTRAVENOUS at 19:53

## 2021-11-06 RX ADMIN — APIXABAN 2.5 MILLIGRAM(S): 2.5 TABLET, FILM COATED ORAL at 22:13

## 2021-11-06 RX ADMIN — Medication 100 MILLIEQUIVALENT(S): at 21:29

## 2021-11-06 RX ADMIN — Medication 100 MILLIEQUIVALENT(S): at 23:08

## 2021-11-06 RX ADMIN — Medication 100 MILLIEQUIVALENT(S): at 20:02

## 2021-11-06 RX ADMIN — AZITHROMYCIN 255 MILLIGRAM(S): 500 TABLET, FILM COATED ORAL at 19:53

## 2021-11-06 NOTE — ED PROVIDER NOTE - NSICDXPASTSURGICALHX_GEN_ALL_CORE_FT
PAST SURGICAL HISTORY:  H/O:  section x2    H/O: Hysterectomy     History of Cardiac Cath     History of Cardiac Cath     S/P CABG (coronary artery bypass graft)     S/P Hysterectomy

## 2021-11-06 NOTE — H&P ADULT - PROBLEM SELECTOR PLAN 2
- Patient presented with asymptomatic hypokalemia K 2.8 most likely poor oral intake and diuretic use  - K replaced orally and IVPB  - Monitor electrolytes and replace as needed - Patient presented with asymptomatic hypokalemia K 2.8 most likely poor oral intake and diuretic use  - NAG Metabolic alkalosis   - K replaced orally and IVPB  - Monitor electrolytes and replace as needed  - Nephrology Dr Arrieta consulted

## 2021-11-06 NOTE — H&P ADULT - PROBLEM SELECTOR PLAN 6
IMPROVE VTE Individual Risk Assessment  RISK                                                                Points  [  ] Previous VTE                                                  3  [  ] Thrombophilia                                               2  [  ] Lower limb paralysis                                      2        (unable to hold up >15 seconds)    [  ] Current Cancer                                              2         (within 6 months)  [  ] Immobilization > 24 hrs                                1  [  ] ICU/CCU stay > 24 hours                              1  [  ] Age > 60                                                      1  IMPROVE VTE Score _________    PPI for GI prophylaxis  On Eliquis full AC - Patient has hx of CAD on BB at home   - C/w home meds

## 2021-11-06 NOTE — H&P ADULT - NSHPPHYSICALEXAM_GEN_ALL_CORE
ICU Vital Signs Last 24 Hrs  T(C): 36.9 (06 Nov 2021 20:26), Max: 36.9 (06 Nov 2021 20:26)  T(F): 98.5 (06 Nov 2021 20:26), Max: 98.5 (06 Nov 2021 20:26)  HR: 63 (06 Nov 2021 20:26) (63 - 78)  BP: 118/65 (06 Nov 2021 20:26) (118/65 - 130/76)  RR: 18 (06 Nov 2021 20:26) (18 - 18)  SpO2: 95% (06 Nov 2021 20:26) (95% - 97%)    GENERAL: NAD, average weight, sat well on RA, looks dry   HEAD:  Atraumatic, Normocephalic  EYES:  conjunctiva and sclera clear  NECK: Supple, No JVD, Normal thyroid  CHEST/LUNG: Clear to auscultation. Clear to percussion bilaterally; No rales, rhonchi, wheezing, or rubs  HEART: irregular rate and rhythm; No murmurs, rubs, or gallops  ABDOMEN: Soft, Nontender, Nondistended; Bowel sounds present, no pain or masses on palpation   NERVOUS SYSTEM:  Alert & Oriented X3,  no neuro deficits   EXTREMITIES:  2+ Peripheral Pulses, No clubbing, or cyanosis, bilateral trace edema R > L, no redness or signs of infection noted   : voiding well   SKIN: warm, dry

## 2021-11-06 NOTE — H&P ADULT - PROBLEM SELECTOR PLAN 3
- Patient c/o body aches, productive cough and generalized weakness   - Rhinovirus positive, no SIRS criteria   - Albuterol PRN, Tylenol, Robitussin   - F/u legionella, sputum culture, mycoplasma  - Supportive measures

## 2021-11-06 NOTE — H&P ADULT - PROBLEM SELECTOR PLAN 7
IMPROVE VTE Individual Risk Assessment  RISK                                                                Points  [  ] Previous VTE                                                  3  [  ] Thrombophilia                                               2  [  ] Lower limb paralysis                                      2        (unable to hold up >15 seconds)    [  ] Current Cancer                                              2         (within 6 months)  [  ] Immobilization > 24 hrs                                1  [  ] ICU/CCU stay > 24 hours                              1  [  ] Age > 60                                                      1  IMPROVE VTE Score _________    PPI for GI prophylaxis  On Eliquis full AC

## 2021-11-06 NOTE — ED PROVIDER NOTE - OBJECTIVE STATEMENT
Patient reports 2 weeks of dry cough, getting worse. Treated with Augmentin x 10 days. No improvement. No fever, ha, cp, sob, ap, n/v/d, focal weakness, paresthesias. Patient reports 2 weeks of dry cough, getting worse. Treated with Augmentin x 10 days. No improvement. No fever, ha, cp, sob, ap, n/v/d, focal weakness, paresthesias. Went to [facility], told to go to ED.

## 2021-11-06 NOTE — H&P ADULT - HISTORY OF PRESENT ILLNESS
89 year old female, from home, lives alone, walks with cane, has past medical hx of CAD s/p CABG + stents and a fib on Eliquis came to ED c/o 2 weeks of productive cough, generalized weakness, chest congestion, body aches. As per patient, she was prescribed amoxicillin clavulanate for 10 days which she finished without any improvement. Patient denies any chest pain, sob, fever, palpitations or other complaints.  89 year old female, from home, lives alone, walks with cane, has past medical hx of CAD s/p CABG + stents and a fib on Eliquis came to ED c/o 2 weeks of productive cough, generalized weakness, chest congestion, body aches. As per patient, she was prescribed amoxicillin clavulanate for 10 days which she finished without any improvement. Patient denies any chest pain, sob, fever, palpitations, nausea, vomiting, diarrhea or other complaints.     GOC full code

## 2021-11-06 NOTE — ED PROVIDER NOTE - CARE PLAN
1 Principal Discharge DX:	Left lower lobe pneumonia  Secondary Diagnosis:	Hyponatremia  Secondary Diagnosis:	Hypokalemia

## 2021-11-06 NOTE — ED PROVIDER NOTE - PROGRESS NOTE DETAILS
Patient is resting comfortably, NAD. Updated patient's daughter via telephone. RVP positive Multiple calls to Dr. Pizarro not answered. Patient endorsed to Dr. Zambrano.

## 2021-11-06 NOTE — H&P ADULT - PROBLEM SELECTOR PLAN 5
- Patient has hx of CAD on BB at home   - C/w home meds - Patient has hx of atrial fibrillation on Eliquis and metoprolol at home   - EKG showed controlled A fib, CXR unremarkable   - C/w home meds

## 2021-11-06 NOTE — H&P ADULT - ASSESSMENT
89 year old female has past medical hx of CAD s/p CABG + stents and a fib on Eliquis admitted to medicine for electrolytes imbalances and supportive care for Rhinovirus +

## 2021-11-06 NOTE — ED ADULT TRIAGE NOTE - CHIEF COMPLAINT QUOTE
biba sent from urgent care for worsening cough x 2 weeks  was told has Lt Pneumonia not responding to treatment

## 2021-11-06 NOTE — ED PROVIDER NOTE - NSICDXFAMILYHX_GEN_ALL_CORE_FT
FAMILY HISTORY:  Family history of hypertension    Child  Still living? Unknown  Family history of coronary artery disease in son, Age at diagnosis: Age Unknown

## 2021-11-06 NOTE — H&P ADULT - PROBLEM SELECTOR PLAN 4
- Patient has hx of atrial fibrillation on Eliquis and metoprolol at home   - EKG showed controlled A fib, CXR unremarkable   - C/w home meds - Patient with trace bilateral LE edema R>L, no signs of infection or open wounds   - F/u doppler of LE to r/o DVT  - Patient is on full AC

## 2021-11-06 NOTE — ED PROVIDER NOTE - NSICDXPASTMEDICALHX_GEN_ALL_CORE_FT
PAST MEDICAL HISTORY:  Atrial fibrillation, unspecified type     Coronary artery disease     Hyperlipidemia, Acquired

## 2021-11-06 NOTE — H&P ADULT - PROBLEM SELECTOR PLAN 1
- Patient presented moderate hyponatremia  - Goal is to increase 4-6 mEq in 24 hrs, avoid overcorrection  - Most likely hypovolemic hyponatremia in the setting of acute viral infection and diuretic use   - F/u serum osmolarity, urine osmolarity, Urine lytes   - Monitor BMP q8-12 hrs   - Monitor intake and output   - Seizure and aspiration precautions - Patient presented moderate hyponatremia  - Goal is to increase 4-6 mEq in 24 hrs, avoid overcorrection  - Most likely hypovolemic hyponatremia in the setting of acute viral infection and diuretic use   - F/u serum osmolarity, urine osmolarity, Urine lytes   - Monitor BMP q4-6 hrs   - Monitor intake and output   - Seizure and aspiration precautions  - Nephrology Dr Arrieta consulted

## 2021-11-07 LAB
ALBUMIN SERPL ELPH-MCNC: 3 G/DL — LOW (ref 3.5–5)
ALP SERPL-CCNC: 64 U/L — SIGNIFICANT CHANGE UP (ref 40–120)
ALT FLD-CCNC: 22 U/L DA — SIGNIFICANT CHANGE UP (ref 10–60)
ANION GAP SERPL CALC-SCNC: 6 MMOL/L — SIGNIFICANT CHANGE UP (ref 5–17)
ANION GAP SERPL CALC-SCNC: 6 MMOL/L — SIGNIFICANT CHANGE UP (ref 5–17)
ANION GAP SERPL CALC-SCNC: 7 MMOL/L — SIGNIFICANT CHANGE UP (ref 5–17)
AST SERPL-CCNC: 35 U/L — SIGNIFICANT CHANGE UP (ref 10–40)
BILIRUB DIRECT SERPL-MCNC: 0.2 MG/DL — SIGNIFICANT CHANGE UP (ref 0–0.2)
BILIRUB INDIRECT FLD-MCNC: 0.3 MG/DL — SIGNIFICANT CHANGE UP (ref 0.2–1)
BILIRUB SERPL-MCNC: 0.5 MG/DL — SIGNIFICANT CHANGE UP (ref 0.2–1.2)
BUN SERPL-MCNC: 24 MG/DL — HIGH (ref 7–18)
BUN SERPL-MCNC: 27 MG/DL — HIGH (ref 7–18)
BUN SERPL-MCNC: 28 MG/DL — HIGH (ref 7–18)
CALCIUM SERPL-MCNC: 9.4 MG/DL — SIGNIFICANT CHANGE UP (ref 8.4–10.5)
CALCIUM SERPL-MCNC: 9.4 MG/DL — SIGNIFICANT CHANGE UP (ref 8.4–10.5)
CALCIUM SERPL-MCNC: 9.7 MG/DL — SIGNIFICANT CHANGE UP (ref 8.4–10.5)
CHLORIDE SERPL-SCNC: 87 MMOL/L — LOW (ref 96–108)
CHLORIDE SERPL-SCNC: 89 MMOL/L — LOW (ref 96–108)
CHLORIDE SERPL-SCNC: 91 MMOL/L — LOW (ref 96–108)
CO2 SERPL-SCNC: 31 MMOL/L — SIGNIFICANT CHANGE UP (ref 22–31)
CO2 SERPL-SCNC: 33 MMOL/L — HIGH (ref 22–31)
CO2 SERPL-SCNC: 34 MMOL/L — HIGH (ref 22–31)
COVID-19 NUCLEOCAPSID GAM AB INTERP: NEGATIVE — SIGNIFICANT CHANGE UP
COVID-19 NUCLEOCAPSID TOTAL GAM ANTIBODY RESULT: 0.16 INDEX — SIGNIFICANT CHANGE UP
COVID-19 SPIKE DOMAIN AB INTERP: POSITIVE
COVID-19 SPIKE DOMAIN ANTIBODY RESULT: 9.05 U/ML — HIGH
CREAT SERPL-MCNC: 0.93 MG/DL — SIGNIFICANT CHANGE UP (ref 0.5–1.3)
CREAT SERPL-MCNC: 1.01 MG/DL — SIGNIFICANT CHANGE UP (ref 0.5–1.3)
CREAT SERPL-MCNC: 1.07 MG/DL — SIGNIFICANT CHANGE UP (ref 0.5–1.3)
CULTURE RESULTS: SIGNIFICANT CHANGE UP
GLUCOSE SERPL-MCNC: 103 MG/DL — HIGH (ref 70–99)
GLUCOSE SERPL-MCNC: 113 MG/DL — HIGH (ref 70–99)
GLUCOSE SERPL-MCNC: 94 MG/DL — SIGNIFICANT CHANGE UP (ref 70–99)
HCT VFR BLD CALC: 30.6 % — LOW (ref 34.5–45)
HGB BLD-MCNC: 10 G/DL — LOW (ref 11.5–15.5)
LEGIONELLA AG UR QL: NEGATIVE — SIGNIFICANT CHANGE UP
MAGNESIUM SERPL-MCNC: 2.8 MG/DL — HIGH (ref 1.6–2.6)
MCHC RBC-ENTMCNC: 28.7 PG — SIGNIFICANT CHANGE UP (ref 27–34)
MCHC RBC-ENTMCNC: 32.7 GM/DL — SIGNIFICANT CHANGE UP (ref 32–36)
MCV RBC AUTO: 87.7 FL — SIGNIFICANT CHANGE UP (ref 80–100)
NRBC # BLD: 0 /100 WBCS — SIGNIFICANT CHANGE UP (ref 0–0)
OSMOLALITY SERPL: 274 MOSMOL/KG — LOW (ref 280–301)
OSMOLALITY UR: 365 MOS/KG — SIGNIFICANT CHANGE UP (ref 50–1200)
PHOSPHATE SERPL-MCNC: 2.2 MG/DL — LOW (ref 2.5–4.5)
PHOSPHATE SERPL-MCNC: 3.2 MG/DL — SIGNIFICANT CHANGE UP (ref 2.5–4.5)
PLATELET # BLD AUTO: 289 K/UL — SIGNIFICANT CHANGE UP (ref 150–400)
POTASSIUM SERPL-MCNC: 3.3 MMOL/L — LOW (ref 3.5–5.3)
POTASSIUM SERPL-MCNC: 3.4 MMOL/L — LOW (ref 3.5–5.3)
POTASSIUM SERPL-MCNC: 3.5 MMOL/L — SIGNIFICANT CHANGE UP (ref 3.5–5.3)
POTASSIUM SERPL-SCNC: 3.3 MMOL/L — LOW (ref 3.5–5.3)
POTASSIUM SERPL-SCNC: 3.4 MMOL/L — LOW (ref 3.5–5.3)
POTASSIUM SERPL-SCNC: 3.5 MMOL/L — SIGNIFICANT CHANGE UP (ref 3.5–5.3)
PROT SERPL-MCNC: 7.6 G/DL — SIGNIFICANT CHANGE UP (ref 6–8.3)
RBC # BLD: 3.49 M/UL — LOW (ref 3.8–5.2)
RBC # FLD: 13.8 % — SIGNIFICANT CHANGE UP (ref 10.3–14.5)
SARS-COV-2 IGG+IGM SERPL QL IA: 0.16 INDEX — SIGNIFICANT CHANGE UP
SARS-COV-2 IGG+IGM SERPL QL IA: 9.05 U/ML — HIGH
SARS-COV-2 IGG+IGM SERPL QL IA: NEGATIVE — SIGNIFICANT CHANGE UP
SARS-COV-2 IGG+IGM SERPL QL IA: POSITIVE
SODIUM SERPL-SCNC: 127 MMOL/L — LOW (ref 135–145)
SODIUM SERPL-SCNC: 128 MMOL/L — LOW (ref 135–145)
SODIUM SERPL-SCNC: 129 MMOL/L — LOW (ref 135–145)
SODIUM UR-SCNC: 10 MMOL/L — SIGNIFICANT CHANGE UP
SPECIMEN SOURCE: SIGNIFICANT CHANGE UP
WBC # BLD: 5.49 K/UL — SIGNIFICANT CHANGE UP (ref 3.8–10.5)
WBC # FLD AUTO: 5.49 K/UL — SIGNIFICANT CHANGE UP (ref 3.8–10.5)

## 2021-11-07 PROCEDURE — 70450 CT HEAD/BRAIN W/O DYE: CPT | Mod: 26

## 2021-11-07 RX ORDER — LANOLIN ALCOHOL/MO/W.PET/CERES
5 CREAM (GRAM) TOPICAL AT BEDTIME
Refills: 0 | Status: DISCONTINUED | OUTPATIENT
Start: 2021-11-07 | End: 2021-11-09

## 2021-11-07 RX ORDER — POTASSIUM CHLORIDE 20 MEQ
20 PACKET (EA) ORAL ONCE
Refills: 0 | Status: COMPLETED | OUTPATIENT
Start: 2021-11-07 | End: 2021-11-07

## 2021-11-07 RX ORDER — SODIUM CHLORIDE 9 MG/ML
1000 INJECTION INTRAMUSCULAR; INTRAVENOUS; SUBCUTANEOUS
Refills: 0 | Status: DISCONTINUED | OUTPATIENT
Start: 2021-11-07 | End: 2021-11-08

## 2021-11-07 RX ORDER — POTASSIUM PHOSPHATE, MONOBASIC POTASSIUM PHOSPHATE, DIBASIC 236; 224 MG/ML; MG/ML
15 INJECTION, SOLUTION INTRAVENOUS ONCE
Refills: 0 | Status: COMPLETED | OUTPATIENT
Start: 2021-11-07 | End: 2021-11-07

## 2021-11-07 RX ORDER — POTASSIUM CHLORIDE 20 MEQ
10 PACKET (EA) ORAL
Refills: 0 | Status: COMPLETED | OUTPATIENT
Start: 2021-11-07 | End: 2021-11-07

## 2021-11-07 RX ORDER — POTASSIUM CHLORIDE 20 MEQ
40 PACKET (EA) ORAL ONCE
Refills: 0 | Status: COMPLETED | OUTPATIENT
Start: 2021-11-07 | End: 2021-11-07

## 2021-11-07 RX ORDER — POTASSIUM CHLORIDE 20 MEQ
40 PACKET (EA) ORAL EVERY 4 HOURS
Refills: 0 | Status: COMPLETED | OUTPATIENT
Start: 2021-11-07 | End: 2021-11-07

## 2021-11-07 RX ADMIN — Medication 5 MILLIGRAM(S): at 21:24

## 2021-11-07 RX ADMIN — Medication 200 MILLIGRAM(S): at 10:24

## 2021-11-07 RX ADMIN — APIXABAN 2.5 MILLIGRAM(S): 2.5 TABLET, FILM COATED ORAL at 17:21

## 2021-11-07 RX ADMIN — Medication 100 MILLIEQUIVALENT(S): at 02:06

## 2021-11-07 RX ADMIN — SODIUM CHLORIDE 60 MILLILITER(S): 9 INJECTION INTRAMUSCULAR; INTRAVENOUS; SUBCUTANEOUS at 01:09

## 2021-11-07 RX ADMIN — Medication 100 MILLIEQUIVALENT(S): at 01:02

## 2021-11-07 RX ADMIN — Medication 650 MILLIGRAM(S): at 18:49

## 2021-11-07 RX ADMIN — Medication 650 MILLIGRAM(S): at 18:03

## 2021-11-07 RX ADMIN — SODIUM CHLORIDE 60 MILLILITER(S): 9 INJECTION INTRAMUSCULAR; INTRAVENOUS; SUBCUTANEOUS at 05:52

## 2021-11-07 RX ADMIN — POTASSIUM PHOSPHATE, MONOBASIC POTASSIUM PHOSPHATE, DIBASIC 62.5 MILLIMOLE(S): 236; 224 INJECTION, SOLUTION INTRAVENOUS at 00:53

## 2021-11-07 RX ADMIN — APIXABAN 2.5 MILLIGRAM(S): 2.5 TABLET, FILM COATED ORAL at 05:51

## 2021-11-07 RX ADMIN — Medication 100 MILLIGRAM(S): at 21:19

## 2021-11-07 RX ADMIN — Medication 20 MILLIEQUIVALENT(S): at 21:22

## 2021-11-07 RX ADMIN — Medication 200 MILLIGRAM(S): at 21:25

## 2021-11-07 RX ADMIN — Medication 200 MILLIGRAM(S): at 05:51

## 2021-11-07 RX ADMIN — PANTOPRAZOLE SODIUM 40 MILLIGRAM(S): 20 TABLET, DELAYED RELEASE ORAL at 05:51

## 2021-11-07 RX ADMIN — Medication 40 MILLIEQUIVALENT(S): at 12:19

## 2021-11-07 RX ADMIN — Medication 100 MILLIGRAM(S): at 18:36

## 2021-11-07 RX ADMIN — Medication 25 MILLIGRAM(S): at 05:51

## 2021-11-07 RX ADMIN — Medication 100 MILLIEQUIVALENT(S): at 03:04

## 2021-11-07 NOTE — PROGRESS NOTE ADULT - PROBLEM SELECTOR PLAN 2
- Patient presented with asymptomatic hypokalemia K 2.8 most likely poor oral intake and diuretic use  - NAG Metabolic alkalosis   - K replaced orally and IVPB  - Monitor electrolytes and replace as needed

## 2021-11-07 NOTE — PROGRESS NOTE ADULT - PROBLEM SELECTOR PLAN 4
- Patient with trace bilateral LE edema R>L, no signs of infection or open wounds   - F/u doppler of LE to r/o DVT  - Patient is on full AC

## 2021-11-07 NOTE — CONSULT NOTE ADULT - SUBJECTIVE AND OBJECTIVE BOX
[facility] NEPHROLOGY- CONSULTATION NOTE    Patient is a 89yo Female with CAD s/p CABG + stents, Afib on Eliquis came to ED c/o 2 weeks of productive cough. Pt found to have Rhino/Enterovirus. Pt found to have Hyponatremia and Hypokalemia. Nephrology consulted for electrolyte abnormalities.     Pt denies any SOB but c/o cough with productive white/ pale yellow sputum. Pt had cough and body ache and her PMD gave her abx for 10 days and as per pt, she feels worse. Pt denies any chest pain, n/v/d or urinary complaints. As per pt with chronic RLE edema (due to vein grafting).   pt with decreased PO intake for ~1 week james the last few days but continue taking her diuretics.       PAST MEDICAL & SURGICAL HISTORY:  Hyperlipidemia, Acquired    Coronary artery disease    Atrial fibrillation, unspecified type    S/P Hysterectomy    History of Cardiac Cath    H/O: Hysterectomy    History of Cardiac Cath    H/O:  section  x2    S/P CABG (coronary artery bypass graft)      codeine (Stomach Upset)    Home Medications Reviewed  Hospital Medications:   MEDICATIONS  (STANDING):  aMIOdarone    Tablet 200 milliGRAM(s) Oral <User Schedule>  apixaban 2.5 milliGRAM(s) Oral two times a day  influenza  Vaccine (HIGH DOSE) 0.7 milliLiter(s) IntraMuscular once  melatonin 5 milliGRAM(s) Oral at bedtime  metolazone 2.5 milliGRAM(s) Oral <User Schedule>  metoprolol succinate ER 25 milliGRAM(s) Oral daily  pantoprazole    Tablet 40 milliGRAM(s) Oral before breakfast  sodium chloride 0.9%. 1000 milliLiter(s) (60 mL/Hr) IV Continuous <Continuous>    SOCIAL HISTORY:  Denies ETOh, or drug use +former smoker  FAMILY HISTORY:  Family history of hypertension    Family history of coronary artery disease in son (Child)        REVIEW OF SYSTEMS:  Gen: no changes in weight  HEENT: no rhinorrhea  Neck: no sore throat  Cards: no chest pain  Resp: no dyspnea +cough with whitish sputum  GI: no nausea or vomiting or diarrhea  : no dysuria or hematuria  Vascular: +RLE edema  Derm: no rashes  Neuro: no numbness/tingling  All other review of systems is negative unless indicated above.    VITALS:  T(F): 98.1 (21 @ 13:17), Max: 98.5 (21 @ 20:26)  HR: 72 (21 @ 14:16)  BP: 122/79 (21 @ 14:16)  RR: 20 (21 @ 13:17)  SpO2: 96% (21 @ 14:16)  Wt(kg): --    Height (cm): 152.4 ( @ 16:02)  Weight (kg): 72.6 ( @ 16:02)  BMI (kg/m2): 31.3 ( @ 16:02)  BSA (m2): 1.7 ( @ 16:02)    PHYSICAL EXAM:  Gen: NAD,   HEENT: anicteric  Neck: no JVD  Cards: irregular, +S1/S2,  Resp: +coughing persistently  +decreased Rt base  GI: soft, NT/ND, NABS  : no CVA tenderness  Extremities: no LLE edema, +mild RLE edema (vein grafting scar)  Derm: no rashes  Neuro: non-focal    LABS:    129 <--, 127 <--, 128 <--, 125 <--  129<L>  |  89<L>  |  24<H>  ----------------------------<  94  3.4<L>   |  33<H>  |  1.01    Ca    9.7      2021 07:41  Phos  3.2       Mg     2.8         TPro  8.5<H>  /  Alb  3.3<L>  /  TBili  0.6  /  DBili      /  AST  41<H>  /  ALT  22  /  AlkPhos  70      Creatinine Trend: 1.01 <--, 0.93 <--, 0.99 <--, 1.15 <--                        10.0   5.49  )-----------( 289      ( 2021 07:41 )             30.6     Urine Studies:  Urinalysis Basic - ( 2021 22:26 )    Color: Yellow / Appearance: Clear / S.005 / pH:   Gluc:  / Ketone: Negative  / Bili: Negative / Urobili: Negative   Blood:  / Protein: 15 / Nitrite: Negative   Leuk Esterase: Negative / RBC: 0-2 /HPF / WBC 0-2 /HPF   Sq Epi:  / Non Sq Epi: Moderate /HPF / Bacteria: Few /HPF        RADIOLOGY & ADDITIONAL STUDIES:

## 2021-11-07 NOTE — CONSULT NOTE ADULT - ASSESSMENT
Patient is a 89yo Female with CAD s/p CABG + stents, Afib on Eliquis came to ED c/o 2 weeks of productive cough. Pt found to have Rhino/Enterovirus. Pt found to have Hyponatremia and Hypokalemia. Nephrology consulted for electrolyte abnormalities.     1. Hyponatremia- likely hypovolemic in the setting of decreased PO intake with metolazone/ lasix use. Pt with elevated CO2; likely contracted. Hold diuretics james metolazone due to hyponatremia. Sosm 274. Check urine osm and urine Na. Pt with increase in serum Na of 4 meq over hrs.   Check TSH and AM Cortisol. Monitor serial BMP's and UO for overcorrection. Do not correct more than 8 meQ/24 hours. Monitor serum sodium.    2. Hypokalemia- in the setting of diuretic use and decreased PO intake. s/p KCl 40 meq PO x1. Monitor serum K/Mg/Phos daily.     3. CKD-3a- age appropriate renal funciton. UA with 15 protein, no blood. Defer secondary w/u due to advanced age. Strict I/Os. Avoid nephrotoxins/ NSAIDs/ RCA. Monitor BMP.    4. HTN 2/2 CKD- BP controlled. Continue with current anti-hypertensive medications. Monitor BP.    5. Rhino/Enterovirus- supportive care. f/u CXR. c/w Robitussin      [facility] NEPHROLOGY  Jayant Mahajan M.D.  Scott Starks D.O.  January Arrieta M.D.  Maira Hdz, MSN, ANP-C  [phone]    [address] [place] [address]

## 2021-11-07 NOTE — PROGRESS NOTE ADULT - PROBLEM SELECTOR PLAN 1
- Patient presented moderate hyponatremia  - Goal is to increase 4-6 mEq in 24 hrs, avoid overcorrection  - Most likely hypovolemic hyponatremia in the setting of acute viral infection and diuretic use   - F/u serum osmolarity, urine osmolarity, Urine lytes   - Monitor BMP q4-6 hrs   - Monitor intake and output   - Seizure and aspiration precautions  renal f/u

## 2021-11-07 NOTE — CONSULT NOTE ADULT - SUBJECTIVE AND OBJECTIVE BOX
Time of visit:    CHIEF COMPLAINT: Patient is a 89y old  Female who presents with a chief complaint of HYPONATREMIA AND HYPOKALEMIA (2021 14:20)      HPI:  89 year old female, from home, lives alone, walks with cane, has past medical hx of CAD s/p CABG + stents and a fib on Eliquis came to ED c/o 2 weeks of productive cough, generalized weakness, chest congestion, body aches. As per patient, she was prescribed amoxicillin clavulanate for 10 days which she finished without any improvement. Patient denies any chest pain, sob, fever, palpitations, nausea, vomiting, diarrhea or other complaints.     [facility] full code  (2021 20:00)   Patient seen and examined.     PAST MEDICAL & SURGICAL HISTORY:  Hyperlipidemia, Acquired    Coronary artery disease    Atrial fibrillation, unspecified type    S/P Hysterectomy    History of Cardiac Cath    H/O: Hysterectomy    History of Cardiac Cath    H/O:  section  x2    S/P CABG (coronary artery bypass graft)        Allergies    codeine (Stomach Upset)    Intolerances        MEDICATIONS  (STANDING):  aMIOdarone    Tablet 200 milliGRAM(s) Oral <User Schedule>  apixaban 2.5 milliGRAM(s) Oral two times a day  benzonatate 100 milliGRAM(s) Oral every 8 hours  influenza  Vaccine (HIGH DOSE) 0.7 milliLiter(s) IntraMuscular once  melatonin 5 milliGRAM(s) Oral at bedtime  metoprolol succinate ER 25 milliGRAM(s) Oral daily  pantoprazole    Tablet 40 milliGRAM(s) Oral before breakfast  sodium chloride 0.9%. 1000 milliLiter(s) (60 mL/Hr) IV Continuous <Continuous>      MEDICATIONS  (PRN):  acetaminophen     Tablet .. 650 milliGRAM(s) Oral every 6 hours PRN Temp greater or equal to 38C (100.4F), Mild Pain (1 - 3)  ALBUTerol    90 MICROgram(s) HFA Inhaler 2 Puff(s) Inhalation every 6 hours PRN Shortness of Breath and/or Wheezing  guaiFENesin Oral Liquid (Sugar-Free) 200 milliGRAM(s) Oral every 6 hours PRN Cough   Medications up to date at time of exam.    Medications up to date at time of exam.    FAMILY HISTORY:  Family history of hypertension    Family history of coronary artery disease in son (Child)        SOCIAL HISTORY  Smoking History: [   ] smoking/smoke exposure, [ x  ] former smoker  Living Condition: [   ] apartment, [   ] private house  Work History:   Travel History: denies recent travel  Illicit Substance Use: denies  Alcohol Use: denies    REVIEW OF SYSTEMS:    CONSTITUTIONAL:  denies fevers, chills, sweats, weight loss    HEENT:  denies diplopia or blurred vision, sore throat or runny nose.    CARDIOVASCULAR:  denies pressure, squeezing, tightness, or heaviness about the chest; no palpitations.    RESPIRATORY:  denies SOB, cough, GODFREY, wheezing.    GASTROINTESTINAL:  denies abdominal pain, nausea, vomiting or diarrhea.    GENITOURINARY: denies dysuria, frequency or urgency.    NEUROLOGIC:  denies numbness, tingling, seizures or weakness.    PSYCHIATRIC:  denies disorder of thought or mood.    MSK: denies swelling, redness      PHYSICAL EXAMINATION:    GENERAL: The patient is a well-developed, well-nourished, in no apparent distress.     Vital Signs Last 24 Hrs  T(C): 36.7 (2021 13:17), Max: 36.9 (2021 20:26)  T(F): 98.1 (2021 13:17), Max: 98.5 (2021 20:26)  HR: 72 (2021 14:16) (63 - 88)  BP: 122/79 (2021 14:16) (106/70 - 125/67)  BP(mean): --  RR: 20 (2021 13:17) (16 - 20)  SpO2: 96% (2021 14:16) (95% - 98%)   (if applicable)    Chest Tube (if applicable)    HEENT: Head is normocephalic and atraumatic. Extraocular muscles are intact. Mucous membranes are moist.     NECK: Supple, no palpable adenopathy.    LUNGS: Clear to auscultation, no wheezing, rales, or rhonchi.    HEART: Regular rate and rhythm without murmur.    ABDOMEN: Soft, nontender, and nondistended.  No hepatosplenomegaly is noted.    RENAL: No difficulty voiding, no pelvic pain    EXTREMITIES: Without any cyanosis, clubbing, rash, lesions or edema.    NEUROLOGIC: Awake, alert, oriented, grossly intact    SKIN: Warm, dry, good turgor.      LABS:                        10.0   5.49  )-----------( 289      ( 2021 07:41 )             30.6     11-07    128<L>  |  91<L>  |  28<H>  ----------------------------<  103<H>  3.5   |  31  |  1.07    Ca    9.4      2021 15:35  Phos  3.2     11-  Mg     2.8     -    TPro  7.6  /  Alb  3.0<L>  /  TBili  0.5  /  DBili  0.2  /  AST  35  /  ALT  22  /  AlkPhos  64  11-07    PT/INR - ( 2021 16:57 )   PT: 18.8 sec;   INR: 1.62 ratio         PTT - ( 2021 16:57 )  PTT:40.4 sec  Urinalysis Basic - ( 2021 22:26 )    Color: Yellow / Appearance: Clear / S.005 / pH: x  Gluc: x / Ketone: Negative  / Bili: Negative / Urobili: Negative   Blood: x / Protein: 15 / Nitrite: Negative   Leuk Esterase: Negative / RBC: 0-2 /HPF / WBC 0-2 /HPF   Sq Epi: x / Non Sq Epi: Moderate /HPF / Bacteria: Few /HPF                      MICROBIOLOGY: (if applicable)    RADIOLOGY & ADDITIONAL STUDIES:  EKG:   CXR: hyperinflated . no obvious infiltrate .. f/u official report   ECHO:    IMPRESSION: 89y Female PAST MEDICAL & SURGICAL HISTORY:  Hyperlipidemia, Acquired    Coronary artery disease    Atrial fibrillation, unspecified type    S/P Hysterectomy    History of Cardiac Cath    H/O: Hysterectomy    History of Cardiac Cath    H/O:  section  x2    S/P CABG (coronary artery bypass graft)     p/w           IMP: This is a  89 year old white woman former smoker  from home, lives alone, walks with cane, has past medical hx of CAD s/p CABG + stents and a fib on Eliquis came to ED c/o 2 weeks of productive cough, generalized weakness, chest congestion, body aches due to viral bronchitis / viral syndrome . Hyponatremia improving        Sugg:  -continue albuterol inhaler  -continue Eliquis   -fall precaution   -PT   -out pat pulmonary f/u   -DVT GI prophy Time of visit:    CHIEF COMPLAINT: Patient is a 89y old  Female who presents with a chief complaint of HYPONATREMIA AND HYPOKALEMIA (2021 14:20)      HPI:  89 year old female, from home, lives alone, walks with cane, has past medical hx of CAD s/p CABG + stents and a fib on Eliquis came to ED c/o 2 weeks of productive cough, generalized weakness, chest congestion, body aches. As per patient, she was prescribed amoxicillin clavulanate for 10 days which she finished without any improvement. Patient denies any chest pain, sob, fever, palpitations, nausea, vomiting, diarrhea or other complaints.     [facility] full code  (2021 20:00)   Patient seen and examined.     PAST MEDICAL & SURGICAL HISTORY:  Hyperlipidemia, Acquired    Coronary artery disease    Atrial fibrillation, unspecified type    S/P Hysterectomy    History of Cardiac Cath    H/O: Hysterectomy    History of Cardiac Cath    H/O:  section  x2    S/P CABG (coronary artery bypass graft)        Allergies    codeine (Stomach Upset)    Intolerances        MEDICATIONS  (STANDING):  aMIOdarone    Tablet 200 milliGRAM(s) Oral <User Schedule>  apixaban 2.5 milliGRAM(s) Oral two times a day  benzonatate 100 milliGRAM(s) Oral every 8 hours  influenza  Vaccine (HIGH DOSE) 0.7 milliLiter(s) IntraMuscular once  melatonin 5 milliGRAM(s) Oral at bedtime  metoprolol succinate ER 25 milliGRAM(s) Oral daily  pantoprazole    Tablet 40 milliGRAM(s) Oral before breakfast  sodium chloride 0.9%. 1000 milliLiter(s) (60 mL/Hr) IV Continuous <Continuous>      MEDICATIONS  (PRN):  acetaminophen     Tablet .. 650 milliGRAM(s) Oral every 6 hours PRN Temp greater or equal to 38C (100.4F), Mild Pain (1 - 3)  ALBUTerol    90 MICROgram(s) HFA Inhaler 2 Puff(s) Inhalation every 6 hours PRN Shortness of Breath and/or Wheezing  guaiFENesin Oral Liquid (Sugar-Free) 200 milliGRAM(s) Oral every 6 hours PRN Cough   Medications up to date at time of exam.    Medications up to date at time of exam.    FAMILY HISTORY:  Family history of hypertension    Family history of coronary artery disease in son (Child)        SOCIAL HISTORY  Smoking History: [   ] smoking/smoke exposure, [ x  ] former smoker  Living Condition: [   ] apartment, [   ] private house  Work History:   Travel History: denies recent travel  Illicit Substance Use: denies  Alcohol Use: denies    REVIEW OF SYSTEMS:    CONSTITUTIONAL:  denies fevers, chills, sweats, weight loss    HEENT:  denies diplopia or blurred vision, sore throat or runny nose.    CARDIOVASCULAR:  denies pressure, squeezing, tightness, or heaviness about the chest; no palpitations.    RESPIRATORY:  denies SOB, cough, GODFREY, wheezing.    GASTROINTESTINAL:  denies abdominal pain, nausea, vomiting or diarrhea.    GENITOURINARY: denies dysuria, frequency or urgency.    NEUROLOGIC:  denies numbness, tingling, seizures or weakness.    PSYCHIATRIC:  denies disorder of thought or mood.    MSK: denies swelling, redness      PHYSICAL EXAMINATION:    GENERAL: The patient is a well-developed, well-nourished, in no apparent distress.     Vital Signs Last 24 Hrs  T(C): 36.7 (2021 13:17), Max: 36.9 (2021 20:26)  T(F): 98.1 (2021 13:17), Max: 98.5 (2021 20:26)  HR: 72 (2021 14:16) (63 - 88)  BP: 122/79 (2021 14:16) (106/70 - 125/67)  BP(mean): --  RR: 20 (2021 13:17) (16 - 20)  SpO2: 96% (2021 14:16) (95% - 98%)   (if applicable)    Chest Tube (if applicable)    HEENT: Head is normocephalic and atraumatic. Extraocular muscles are intact. Mucous membranes are moist.     NECK: Supple, no palpable adenopathy.    LUNGS: Clear to auscultation, no wheezing, rales, or rhonchi.    HEART: Regular rate and rhythm without murmur.    ABDOMEN: Soft, nontender, and nondistended.  No hepatosplenomegaly is noted.    RENAL: No difficulty voiding, no pelvic pain    EXTREMITIES: Without any cyanosis, clubbing, rash, lesions or edema.    NEUROLOGIC: Awake, alert, oriented, grossly intact    SKIN: Warm, dry, good turgor.      LABS:                        10.0   5.49  )-----------( 289      ( 2021 07:41 )             30.6     11-07    128<L>  |  91<L>  |  28<H>  ----------------------------<  103<H>  3.5   |  31  |  1.07    Ca    9.4      2021 15:35  Phos  3.2     11-  Mg     2.8     -    TPro  7.6  /  Alb  3.0<L>  /  TBili  0.5  /  DBili  0.2  /  AST  35  /  ALT  22  /  AlkPhos  64  11-07    PT/INR - ( 2021 16:57 )   PT: 18.8 sec;   INR: 1.62 ratio         PTT - ( 2021 16:57 )  PTT:40.4 sec  Urinalysis Basic - ( 2021 22:26 )    Color: Yellow / Appearance: Clear / S.005 / pH: x  Gluc: x / Ketone: Negative  / Bili: Negative / Urobili: Negative   Blood: x / Protein: 15 / Nitrite: Negative   Leuk Esterase: Negative / RBC: 0-2 /HPF / WBC 0-2 /HPF   Sq Epi: x / Non Sq Epi: Moderate /HPF / Bacteria: Few /HPF                      MICROBIOLOGY: (if applicable)    RADIOLOGY & ADDITIONAL STUDIES:  EKG:   CXR: hyperinflated . no obvious infiltrate .. f/u official report   ECHO:    IMPRESSION: 89y Female PAST MEDICAL & SURGICAL HISTORY:  Hyperlipidemia, Acquired    Coronary artery disease    Atrial fibrillation, unspecified type    S/P Hysterectomy    History of Cardiac Cath    H/O: Hysterectomy    History of Cardiac Cath    H/O:  section  x2    S/P CABG (coronary artery bypass graft)     p/w           IMP: This is a  89 year old white woman former smoker  from home, lives alone, walks with cane, has past medical hx of CAD s/p CABG + stents and a fib on Eliquis came to ED c/o 2 weeks of productive cough, generalized weakness, chest congestion, body aches due to viral bronchitis / viral syndrome . Hyponatremia improving        Sugg:  -continue albuterol inhaler  -continue Eliquis   -correct electrolyte imbalance   -fall precaution   -PT   -out pat pulmonary f/u   -DVT GI prophy

## 2021-11-07 NOTE — PHYSICAL THERAPY INITIAL EVALUATION ADULT - ADDITIONAL COMMENTS
Pt reports independence with mobility using SAC. Denies recent falls. Pt performs all ADLs independently, but relies on daughter for assistance with community ambulation. Pt gets tired/SOB and can not walk for extended distances.

## 2021-11-07 NOTE — PHYSICAL THERAPY INITIAL EVALUATION ADULT - GENERAL OBSERVATIONS, REHAB EVAL
Pt received walking back from BR w/ PCA using SAC, unsteadiness in gait. Daughter at bedside, pt in NAD, agreeable to PT. Pt w/ mild swelling, turgid skin RLE. No tenderness to palpation to either LE, no warmth or redness noted. Pt on blood thinners.

## 2021-11-07 NOTE — CHART NOTE - NSCHARTNOTEFT_GEN_A_CORE
EVENT: notified by RN that pt reported that a metal sonia had fell on head yesterday     OBJECTIVE:  Vital Signs Last 24 Hrs  T(C): 36.7 (07 Nov 2021 13:17), Max: 36.9 (06 Nov 2021 20:26)  T(F): 98.1 (07 Nov 2021 13:17), Max: 98.5 (06 Nov 2021 20:26)  HR: 72 (07 Nov 2021 14:16) (63 - 88)  BP: 122/79 (07 Nov 2021 14:16) (106/70 - 130/76)  BP(mean): --  RR: 20 (07 Nov 2021 13:17) (16 - 20)  SpO2: 96% (07 Nov 2021 14:16) (95% - 98%)    REVIEW OF SYSTEMS:  CONSTITUTIONAL: No fever, chills, minimal right parietal region tenderness   ENMT:  No difficulty hearing, no change in vision  NECK: No pain or stiffness  RESPIRATORY: +ve cough, no SOB  CARDIOVASCULAR: No chest pain, palpitations  GASTROINTESTINAL: No abdominal pain. No nausea, vomiting, or diarrhea  GENITOURINARY: No dysuria  NEUROLOGICAL: No HA  MUSCULOSKELETAL: No joint pain or swelling; No muscle, back, or extremity pain    PHYSICAL EXAM:  GENERAL: NAD  EYES: clear conjunctiva; EOMI  ENMT: Moist mucous membranes, [place]   NECK: Supple, No JVD  CHEST/LUNG: Clear to auscultation bilaterally; No rales, rhonchi, wheezing, or rubs  HEART: S1, S2, Regular rate and rhythm  ABDOMEN: Soft, Nontender, Nondistended; Bowel sounds present  NEURO: Alert & Oriented X3, no neuro deficit   EXTREMITIES: No LE edema, no calf tenderness  SKIN: No rashes or lesions      LABS:                        10.0   5.49  )-----------( 289      ( 07 Nov 2021 07:41 )             30.6     11-07    129<L>  |  89<L>  |  24<H>  ----------------------------<  94  3.4<L>   |  33<H>  |  1.01    Ca    9.7      07 Nov 2021 07:41  Phos  3.2     11-07  Mg     2.8     11-07    TPro  8.5<H>  /  Alb  3.3<L>  /  TBili  0.6  /  DBili  x   /  AST  41<H>  /  ALT  22  /  AlkPhos  70  11-06    A/P: 89 year old female, from home, lives alone, walks with cane, has past medical hx of CAD s/p CABG + stents and a fib on Eliquis came to ED c/o 2 weeks of productive cough, generalized weakness, chest congestion, body aches.     Reported head trauma:   -pt reported this afternoon that a metal sonia had fallen on her head yesterday, no witnesses or collateral information available   -Pt axo x3, no neuro deficit  -f/u CT head now   -fall precautions     Above d/w pt, pt's daughter at bedside and Dr. Zambrano       FOLLOW UP / RESULT:  CT scan of head

## 2021-11-07 NOTE — PROGRESS NOTE ADULT - SUBJECTIVE AND OBJECTIVE BOX
SUBJECTIVE / OVERNIGHT EVENTS:      MEDICATIONS  (STANDING):  aMIOdarone    Tablet 200 milliGRAM(s) Oral <User Schedule>  apixaban 2.5 milliGRAM(s) Oral two times a day  influenza  Vaccine (HIGH DOSE) 0.7 milliLiter(s) IntraMuscular once  melatonin 5 milliGRAM(s) Oral at bedtime  metoprolol succinate ER 25 milliGRAM(s) Oral daily  pantoprazole    Tablet 40 milliGRAM(s) Oral before breakfast  sodium chloride 0.9%. 1000 milliLiter(s) (60 mL/Hr) IV Continuous <Continuous>    MEDICATIONS  (PRN):  acetaminophen     Tablet .. 650 milliGRAM(s) Oral every 6 hours PRN Temp greater or equal to 38C (100.4F), Mild Pain (1 - 3)  ALBUTerol    90 MICROgram(s) HFA Inhaler 2 Puff(s) Inhalation every 6 hours PRN Shortness of Breath and/or Wheezing  guaiFENesin Oral Liquid (Sugar-Free) 200 milliGRAM(s) Oral every 6 hours PRN Cough        CAPILLARY BLOOD GLUCOSE        I&O's Summary      Constitutional: No fever, fatigue  Skin: No rash.  Eyes: No recent vision problems or eye pain.  ENT: No congestion, ear pain, or sore throat.  Cardiovascular: No chest pain or palpation.  Respiratory: No cough, shortness of breath, congestion, or wheezing.  Gastrointestinal: No abdominal pain, nausea, vomiting, or diarrhea.  Genitourinary: No dysuria.  Musculoskeletal: No joint swelling.  Neurologic: No headache.    PHYSICAL EXAM:  GENERAL: NAD  EYES: EOMI, PERRLA  NECK: Supple, No JVD  CHEST/LUNG: dec breath sounds at bases  HEART:  S1 , S2 +  ABDOMEN: soft , bs+  EXTREMITIES:  trace edema  NEUROLOGY:alert awake      LABS:                        10.0   5.49  )-----------( 289      ( 2021 07:41 )             30.6     11-    129<L>  |  89<L>  |  24<H>  ----------------------------<  94  3.4<L>   |  33<H>  |  1.01    Ca    9.7      2021 07:41  Phos  3.2     11-  Mg     2.8     -    TPro  8.5<H>  /  Alb  3.3<L>  /  TBili  0.6  /  DBili  x   /  AST  41<H>  /  ALT  22  /  AlkPhos  70  11-06    PT/INR - ( 2021 16:57 )   PT: 18.8 sec;   INR: 1.62 ratio         PTT - ( 2021 16:57 )  PTT:40.4 sec      Urinalysis Basic - ( 2021 22:26 )    Color: Yellow / Appearance: Clear / S.005 / pH: x  Gluc: x / Ketone: Negative  / Bili: Negative / Urobili: Negative   Blood: x / Protein: 15 / Nitrite: Negative   Leuk Esterase: Negative / RBC: 0-2 /HPF / WBC 0-2 /HPF   Sq Epi: x / Non Sq Epi: Moderate /HPF / Bacteria: Few /HPF        RADIOLOGY & ADDITIONAL TESTS:    Imaging Personally Reviewed:    Consultant(s) Notes Reviewed:      Care Discussed with Consultants/Other Providers:

## 2021-11-08 DIAGNOSIS — Z02.9 ENCOUNTER FOR ADMINISTRATIVE EXAMINATIONS, UNSPECIFIED: ICD-10-CM

## 2021-11-08 DIAGNOSIS — Z71.89 OTHER SPECIFIED COUNSELING: ICD-10-CM

## 2021-11-08 LAB
ANION GAP SERPL CALC-SCNC: 4 MMOL/L — LOW (ref 5–17)
BUN SERPL-MCNC: 29 MG/DL — HIGH (ref 7–18)
CALCIUM SERPL-MCNC: 9.5 MG/DL — SIGNIFICANT CHANGE UP (ref 8.4–10.5)
CHLORIDE SERPL-SCNC: 94 MMOL/L — LOW (ref 96–108)
CO2 SERPL-SCNC: 32 MMOL/L — HIGH (ref 22–31)
CREAT SERPL-MCNC: 1.2 MG/DL — SIGNIFICANT CHANGE UP (ref 0.5–1.3)
GLUCOSE SERPL-MCNC: 91 MG/DL — SIGNIFICANT CHANGE UP (ref 70–99)
HCT VFR BLD CALC: 31.5 % — LOW (ref 34.5–45)
HGB BLD-MCNC: 10.1 G/DL — LOW (ref 11.5–15.5)
MAGNESIUM SERPL-MCNC: 2.6 MG/DL — SIGNIFICANT CHANGE UP (ref 1.6–2.6)
MCHC RBC-ENTMCNC: 28.5 PG — SIGNIFICANT CHANGE UP (ref 27–34)
MCHC RBC-ENTMCNC: 32.1 GM/DL — SIGNIFICANT CHANGE UP (ref 32–36)
MCV RBC AUTO: 89 FL — SIGNIFICANT CHANGE UP (ref 80–100)
NRBC # BLD: 0 /100 WBCS — SIGNIFICANT CHANGE UP (ref 0–0)
PHOSPHATE SERPL-MCNC: 2.5 MG/DL — SIGNIFICANT CHANGE UP (ref 2.5–4.5)
PLATELET # BLD AUTO: 261 K/UL — SIGNIFICANT CHANGE UP (ref 150–400)
POTASSIUM SERPL-MCNC: 3.7 MMOL/L — SIGNIFICANT CHANGE UP (ref 3.5–5.3)
POTASSIUM SERPL-SCNC: 3.7 MMOL/L — SIGNIFICANT CHANGE UP (ref 3.5–5.3)
RBC # BLD: 3.54 M/UL — LOW (ref 3.8–5.2)
RBC # FLD: 14 % — SIGNIFICANT CHANGE UP (ref 10.3–14.5)
SODIUM SERPL-SCNC: 130 MMOL/L — LOW (ref 135–145)
TSH SERPL-MCNC: 4.15 UU/ML — SIGNIFICANT CHANGE UP (ref 0.34–4.82)
WBC # BLD: 5.24 K/UL — SIGNIFICANT CHANGE UP (ref 3.8–10.5)
WBC # FLD AUTO: 5.24 K/UL — SIGNIFICANT CHANGE UP (ref 3.8–10.5)

## 2021-11-08 PROCEDURE — 93970 EXTREMITY STUDY: CPT | Mod: 26

## 2021-11-08 RX ORDER — ACETAMINOPHEN 500 MG
650 TABLET ORAL ONCE
Refills: 0 | Status: COMPLETED | OUTPATIENT
Start: 2021-11-08 | End: 2021-11-08

## 2021-11-08 RX ORDER — SODIUM CHLORIDE 9 MG/ML
1000 INJECTION INTRAMUSCULAR; INTRAVENOUS; SUBCUTANEOUS
Refills: 0 | Status: DISCONTINUED | OUTPATIENT
Start: 2021-11-08 | End: 2021-11-09

## 2021-11-08 RX ORDER — SENNA PLUS 8.6 MG/1
2 TABLET ORAL AT BEDTIME
Refills: 0 | Status: DISCONTINUED | OUTPATIENT
Start: 2021-11-08 | End: 2021-11-09

## 2021-11-08 RX ADMIN — AMIODARONE HYDROCHLORIDE 200 MILLIGRAM(S): 400 TABLET ORAL at 11:43

## 2021-11-08 RX ADMIN — SODIUM CHLORIDE 60 MILLILITER(S): 9 INJECTION INTRAMUSCULAR; INTRAVENOUS; SUBCUTANEOUS at 11:45

## 2021-11-08 RX ADMIN — Medication 100 MILLIGRAM(S): at 22:17

## 2021-11-08 RX ADMIN — SENNA PLUS 2 TABLET(S): 8.6 TABLET ORAL at 22:17

## 2021-11-08 RX ADMIN — SODIUM CHLORIDE 60 MILLILITER(S): 9 INJECTION INTRAMUSCULAR; INTRAVENOUS; SUBCUTANEOUS at 22:18

## 2021-11-08 RX ADMIN — PANTOPRAZOLE SODIUM 40 MILLIGRAM(S): 20 TABLET, DELAYED RELEASE ORAL at 05:15

## 2021-11-08 RX ADMIN — Medication 200 MILLIGRAM(S): at 23:26

## 2021-11-08 RX ADMIN — Medication 100 MILLIGRAM(S): at 05:15

## 2021-11-08 RX ADMIN — APIXABAN 2.5 MILLIGRAM(S): 2.5 TABLET, FILM COATED ORAL at 05:15

## 2021-11-08 RX ADMIN — Medication 650 MILLIGRAM(S): at 16:38

## 2021-11-08 RX ADMIN — Medication 5 MILLIGRAM(S): at 22:17

## 2021-11-08 RX ADMIN — Medication 25 MILLIGRAM(S): at 05:15

## 2021-11-08 RX ADMIN — Medication 100 MILLIGRAM(S): at 15:25

## 2021-11-08 RX ADMIN — APIXABAN 2.5 MILLIGRAM(S): 2.5 TABLET, FILM COATED ORAL at 17:53

## 2021-11-08 NOTE — PROGRESS NOTE ADULT - ASSESSMENT
89 year old female has past medical hx of CAD s/p CABG + stents and a fib on Eliquis admitted to medicine for electrolytes imbalances and supportive care for Rhinovirus + 89 year old female has past medical hx of CAD s/p CABG + stents and a fib on Eliquis admitted to medicine for electrolytes imbalances and supportive care for Rhinovirus +  and for edema to RLE. CXR clears, RLE no DVT, CTH obtained as she claimed she was hit by metal part from ED, CTH with no hemorrhage, acute sinusitis. Pt was tx with Augmentin as outpt for 10 days which will be enough to tx sinusitis, no further ABT as I discussed with Dr. Zambrano. Continue NS at 60cc/ hr x 12 hours for hyponatremia per Dr. Arrieta. PT with home PT.

## 2021-11-08 NOTE — PROGRESS NOTE ADULT - SUBJECTIVE AND OBJECTIVE BOX
Time of Visit:  Patient seen and examined.     MEDICATIONS  (STANDING):  aMIOdarone    Tablet 200 milliGRAM(s) Oral <User Schedule>  apixaban 2.5 milliGRAM(s) Oral two times a day  benzonatate 100 milliGRAM(s) Oral every 8 hours  influenza  Vaccine (HIGH DOSE) 0.7 milliLiter(s) IntraMuscular once  melatonin 5 milliGRAM(s) Oral at bedtime  metoprolol succinate ER 25 milliGRAM(s) Oral daily  pantoprazole    Tablet 40 milliGRAM(s) Oral before breakfast  senna 2 Tablet(s) Oral at bedtime  sodium chloride 0.9%. 1000 milliLiter(s) (60 mL/Hr) IV Continuous <Continuous>      MEDICATIONS  (PRN):  acetaminophen     Tablet .. 650 milliGRAM(s) Oral every 6 hours PRN Temp greater or equal to 38C (100.4F), Mild Pain (1 - 3)  ALBUTerol    90 MICROgram(s) HFA Inhaler 2 Puff(s) Inhalation every 6 hours PRN Shortness of Breath and/or Wheezing  bisacodyl 5 milliGRAM(s) Oral every 12 hours PRN Constipation  guaiFENesin Oral Liquid (Sugar-Free) 200 milliGRAM(s) Oral every 6 hours PRN Cough       Medications up to date at time of exam.      PHYSICAL EXAMINATION:  Vital Signs Last 24 Hrs  T(C): 37.2 (2021 13:38), Max: 37.2 (2021 13:38)  T(F): 98.9 (2021 13:38), Max: 98.9 (2021 13:38)  HR: 67 (2021 13:38) (67 - 88)  BP: 114/57 (2021 13:38) (109/69 - 120/73)  BP(mean): --  RR: 18 (2021 13:38) (18 - 18)  SpO2: 96% (2021 13:38) (95% - 97%)   (if applicable)    General: Alert and oriented. Able to answer question with no SOB.  No acute distress.     HEENT: Head is normocephalic and atraumatic. Extraocular muscles are intact. Mucous membranes are moist.     NECK: Supple, no palpable adenopathy.    LUNGS: Clear to auscultation bilaterally with no wheezing, rales, or rhonchi. No use of accessory muscle.     HEART: S1 S2 Irregular rate and no click/ rub.     ABDOMEN: Soft, nontender, and nondistended. Active bowel sounds.     ; No bladder distention and tenderness.     NEUROLOGIC: Awake, alert, oriented.     SKIN: Warm and moist. Non diaphoretic.       LABS:                        10.1   5.24  )-----------( 261      ( 2021 05:37 )             31.5     11-08    130<L>  |  94<L>  |  29<H>  ----------------------------<  91  3.7   |  32<H>  |  1.20    Ca    9.5      2021 05:37  Phos  2.5     -  Mg     2.6     -08    TPro  7.6  /  Alb  3.0<L>  /  TBili  0.5  /  DBili  0.2  /  AST  35  /  ALT  22  /  AlkPhos  64  11-07    PT/INR - ( 2021 16:57 )   PT: 18.8 sec;   INR: 1.62 ratio         PTT - ( 2021 16:57 )  PTT:40.4 sec  Urinalysis Basic - ( 2021 22:26 )    Color: Yellow / Appearance: Clear / S.005 / pH: x  Gluc: x / Ketone: Negative  / Bili: Negative / Urobili: Negative   Blood: x / Protein: 15 / Nitrite: Negative   Leuk Esterase: Negative / RBC: 0-2 /HPF / WBC 0-2 /HPF   Sq Epi: x / Non Sq Epi: Moderate /HPF / Bacteria: Few /HPF                      MICROBIOLOGY: (if applicable)    RADIOLOGY & ADDITIONAL STUDIES:  EKG:   CXR:  ECHO:    IMPRESSION: 89y Female PAST MEDICAL & SURGICAL HISTORY:  Hyperlipidemia, Acquired    Coronary artery disease    Atrial fibrillation, unspecified type    S/P Hysterectomy    History of Cardiac Cath    H/O: Hysterectomy    History of Cardiac Cath    H/O:  section  x2    S/P CABG (coronary artery bypass graft)    Impression; 89 Y/O Female former smoker. Presented in ED with 2 weeks of productive cough, generalized weakness, chest congestion, body aches due to viral bronchitis / viral syndrome . Hyponatremia with latest  , improving      Suggestion :  O2 saturation 96% room air. So far saturating good room air.   Continue PRN Albuterol inhaler 2 puffs Q 6 Hours.   Fall precaution   PT/OT   Out patient  pulmonary f/u   -DVT GI prophylactic.

## 2021-11-08 NOTE — PROGRESS NOTE ADULT - SUBJECTIVE AND OBJECTIVE BOX
NP Note discussed with  Primary Attending    Patient is a 89y old  Female who presents with a chief complaint of HYPONATREMIA AND HYPOKALEMIA (2021 14:15)      INTERVAL HPI/OVERNIGHT EVENTS: no new complaints    MEDICATIONS  (STANDING):  aMIOdarone    Tablet 200 milliGRAM(s) Oral <User Schedule>  apixaban 2.5 milliGRAM(s) Oral two times a day  benzonatate 100 milliGRAM(s) Oral every 8 hours  influenza  Vaccine (HIGH DOSE) 0.7 milliLiter(s) IntraMuscular once  melatonin 5 milliGRAM(s) Oral at bedtime  metoprolol succinate ER 25 milliGRAM(s) Oral daily  pantoprazole    Tablet 40 milliGRAM(s) Oral before breakfast  senna 2 Tablet(s) Oral at bedtime  sodium chloride 0.9%. 1000 milliLiter(s) (60 mL/Hr) IV Continuous <Continuous>    MEDICATIONS  (PRN):  acetaminophen     Tablet .. 650 milliGRAM(s) Oral every 6 hours PRN Temp greater or equal to 38C (100.4F), Mild Pain (1 - 3)  ALBUTerol    90 MICROgram(s) HFA Inhaler 2 Puff(s) Inhalation every 6 hours PRN Shortness of Breath and/or Wheezing  bisacodyl 5 milliGRAM(s) Oral every 12 hours PRN Constipation  guaiFENesin Oral Liquid (Sugar-Free) 200 milliGRAM(s) Oral every 6 hours PRN Cough      __________________________________________________  REVIEW OF SYSTEMS:    CONSTITUTIONAL: No fever,   EYES: no acute visual disturbances  NECK: No pain or stiffness  RESPIRATORY: No cough; No shortness of breath  CARDIOVASCULAR: No chest pain, no palpitations  GASTROINTESTINAL: No pain. No nausea or vomiting; No diarrhea   NEUROLOGICAL: No headache or numbness, no tremors  MUSCULOSKELETAL: No joint pain, no muscle pain  GENITOURINARY: no dysuria, no frequency, no hesitancy  PSYCHIATRY: no depression , no anxiety  ALL OTHER  ROS negative        Vital Signs Last 24 Hrs  T(C): 37.2 (2021 13:38), Max: 37.2 (2021 13:38)  T(F): 98.9 (2021 13:38), Max: 98.9 (2021 13:38)  HR: 67 (2021 13:38) (67 - 88)  BP: 114/57 (2021 13:38) (109/69 - 120/73)  BP(mean): --  RR: 18 (2021 13:38) (18 - 18)  SpO2: 96% (2021 13:38) (95% - 97%)    ________________________________________________  PHYSICAL EXAM:  GENERAL: NAD  HEENT: Normocephalic;  conjunctivae and sclerae clear; moist mucous membranes;   NECK : supple  CHEST/LUNG: Clear to auscultation bilaterally with good air entry   HEART: S1 S2  regular; no murmurs, gallops or rubs  ABDOMEN: Soft, Nontender, Nondistended; Bowel sounds present  EXTREMITIES: no cyanosis; no edema; no calf tenderness  SKIN: warm and dry; no rash  NERVOUS SYSTEM:  Awake and alert; Oriented  to place, person and time ; no new deficits    _________________________________________________  LABS:                        10.1   5.24  )-----------( 261      ( 2021 05:37 )             31.5     11-08    130<L>  |  94<L>  |  29<H>  ----------------------------<  91  3.7   |  32<H>  |  1.20    Ca    9.5      2021 05:37  Phos  2.5     11-08  Mg     2.6     11-08    TPro  7.6  /  Alb  3.0<L>  /  TBili  0.5  /  DBili  0.2  /  AST  35  /  ALT  22  /  AlkPhos  64  11-07    PT/INR - ( 2021 16:57 )   PT: 18.8 sec;   INR: 1.62 ratio         PTT - ( 2021 16:57 )  PTT:40.4 sec  Urinalysis Basic - ( 2021 22:26 )    Color: Yellow / Appearance: Clear / S.005 / pH: x  Gluc: x / Ketone: Negative  / Bili: Negative / Urobili: Negative   Blood: x / Protein: 15 / Nitrite: Negative   Leuk Esterase: Negative / RBC: 0-2 /HPF / WBC 0-2 /HPF   Sq Epi: x / Non Sq Epi: Moderate /HPF / Bacteria: Few /HPF      CAPILLARY BLOOD GLUCOSE            RADIOLOGY & ADDITIONAL TESTS:    Imaging Personally Reviewed:  YES/NO    Consultant(s) Notes Reviewed:   YES/ No    Care Discussed with Consultants :     Plan of care was discussed with patient and /or primary care giver; all questions and concerns were addressed and care was aligned with patient's wishes.     NP Note discussed with  Primary Attending    Patient is a 89y old  Female who presents with a chief complaint of HYPONATREMIA AND HYPOKALEMIA (2021 14:15)      INTERVAL HPI/OVERNIGHT EVENTS: no new complaints    MEDICATIONS  (STANDING):  aMIOdarone    Tablet 200 milliGRAM(s) Oral <User Schedule>  apixaban 2.5 milliGRAM(s) Oral two times a day  benzonatate 100 milliGRAM(s) Oral every 8 hours  influenza  Vaccine (HIGH DOSE) 0.7 milliLiter(s) IntraMuscular once  melatonin 5 milliGRAM(s) Oral at bedtime  metoprolol succinate ER 25 milliGRAM(s) Oral daily  pantoprazole    Tablet 40 milliGRAM(s) Oral before breakfast  senna 2 Tablet(s) Oral at bedtime  sodium chloride 0.9%. 1000 milliLiter(s) (60 mL/Hr) IV Continuous <Continuous>    MEDICATIONS  (PRN):  acetaminophen     Tablet .. 650 milliGRAM(s) Oral every 6 hours PRN Temp greater or equal to 38C (100.4F), Mild Pain (1 - 3)  ALBUTerol    90 MICROgram(s) HFA Inhaler 2 Puff(s) Inhalation every 6 hours PRN Shortness of Breath and/or Wheezing  bisacodyl 5 milliGRAM(s) Oral every 12 hours PRN Constipation  guaiFENesin Oral Liquid (Sugar-Free) 200 milliGRAM(s) Oral every 6 hours PRN Cough      __________________________________________________  REVIEW OF SYSTEMS:    CONSTITUTIONAL: No fever,   EYES: no acute visual disturbances  NECK: No pain or stiffness  RESPIRATORY: dry  cough; No shortness of breath  CARDIOVASCULAR: No chest pain, no palpitations  GASTROINTESTINAL: No pain. No nausea or vomiting; No diarrhea   NEUROLOGICAL: No headache or numbness, no tremors  MUSCULOSKELETAL: No joint pain, no muscle pain  GENITOURINARY: no dysuria, no frequency, no hesitancy  PSYCHIATRY: no depression , no anxiety  ALL OTHER  ROS negative        Vital Signs Last 24 Hrs  T(C): 37.2 (2021 13:38), Max: 37.2 (2021 13:38)  T(F): 98.9 (2021 13:38), Max: 98.9 (2021 13:38)  HR: 67 (2021 13:38) (67 - 88)  BP: 114/57 (2021 13:38) (109/69 - 120/73)  BP(mean): --  RR: 18 (2021 13:38) (18 - 18)  SpO2: 96% (2021 13:38) (95% - 97%)    ________________________________________________  PHYSICAL EXAM:  GENERAL: NAD  HEENT: Normocephalic;  conjunctivae and sclerae clear; moist mucous membranes;   NECK : supple  CHEST/LUNG: Clear to auscultation bilaterally with good air entry   HEART: S1 S2  regular; no murmurs, gallops or rubs  ABDOMEN: Soft, Nontender, Nondistended; Bowel sounds present  EXTREMITIES: no cyanosis; no edema; no calf tenderness  SKIN: warm and dry; no rash  NERVOUS SYSTEM:  Awake and alert; Oriented  to place, person and time ; no new deficits    _________________________________________________  LABS:                        10.1   5.24  )-----------( 261      ( 2021 05:37 )             31.5     11-08    130<L>  |  94<L>  |  29<H>  ----------------------------<  91  3.7   |  32<H>  |  1.20    Ca    9.5      2021 05:37  Phos  2.5     11-08  Mg     2.6     11-08    TPro  7.6  /  Alb  3.0<L>  /  TBili  0.5  /  DBili  0.2  /  AST  35  /  ALT  22  /  AlkPhos  64  11-07    PT/INR - ( 2021 16:57 )   PT: 18.8 sec;   INR: 1.62 ratio         PTT - ( 2021 16:57 )  PTT:40.4 sec  Urinalysis Basic - ( 2021 22:26 )    Color: Yellow / Appearance: Clear / S.005 / pH: x  Gluc: x / Ketone: Negative  / Bili: Negative / Urobili: Negative   Blood: x / Protein: 15 / Nitrite: Negative   Leuk Esterase: Negative / RBC: 0-2 /HPF / WBC 0-2 /HPF   Sq Epi: x / Non Sq Epi: Moderate /HPF / Bacteria: Few /HPF      CAPILLARY BLOOD GLUCOSE            RADIOLOGY & ADDITIONAL TESTS:  < from: US Duplex Venous Lower Ext Complete, Bilateral (21 @ 14:49) >    EXAM:  US DPLX LWR EXT VEINS COMPL BI                            PROCEDURE DATE:  2021          INTERPRETATION:  CLINICAL INFORMATION: Pneumonia. Bilateral lower extremity swelling.    COMPARISON: 4/3/2017    TECHNIQUE: Duplex sonography of the BILATERAL LOWER extremity veins with color and spectral Doppler, with and without compression.    FINDINGS:    RIGHT:  Normal compressibility of the RIGHT common femoral, femoral and popliteal veins.  Doppler examination shows normal spontaneous and phasic flow.  No RIGHT calf vein thrombosis is detected.    LEFT:  Normal compressibility of the LEFT common femoral, femoral and popliteal veins.  Doppler examination shows normal spontaneous and phasic flow.  No LEFT calf vein thrombosis is detected.    IMPRESSION:  No evidence of deep venous thrombosis in either lower extremity.          --- End of Report ---            TY FULTON MD; Attending Radiologist  This document has been electronically signed. 2021  3:05PM    < end of copied text >  < from: CT Head No Cont (21 @ 16:00) >  EXAM:  CT BRAIN                            PROCEDURE DATE:  2021          INTERPRETATION:  CLINICAL STATEMENT: Pain.    TECHNIQUE: CT of the head was performed without IV contrast.  RAPID artificial intelligence was utilized for the preliminary evaluation of intracranial hemorrhage.    COMPARISON: None.    FINDINGS:  There is moderate diffuse parenchymal volume loss. There are areas of low attenuation in the periventricular white matter likely related to mild chronic microvascular ischemic changes.    There is no acute intracranial hemorrhage, parenchymal mass, mass effect or midline shift. There is no acute territorial infarct. There is no hydrocephalus.    The cranium is intact.    The coastal thickening paranasal sinuses with smallair-fluid levels maxillary sinuses    IMPRESSION:  No acute intracranial hemorrhage or acute territorial infarct.  If symptoms persist, follow-up MRI exam recommended.    Air-fluid levels in the maxillary sinuses which can be seen in acute sinusitis.Correlate clinically    --- End of Report ---            KENDRICK BLACKWOOD MD; Attending Radiologist  This document has been electronically signed. 2021  9:02AM    < end of copied text >  < from: Xray Chest 2 Views PA/Lat (21 @ 16:58) >    EXAM:  XR CHEST PA LAT 2V                            PROCEDURE DATE:  2021          INTERPRETATION:  EXAM: PA and lateral chest.    COMPARISON: 3/7/2017    CLINICAL INFORMATION: Cough    FINDINGS:    Patient is status post prior sternotomy.  The heart is enlarged. Coronary stents are noted. The aorta is mildly calcified and tortuous.  There are no focal infiltrates.  There are no pleural effusions.  The bones are demineralized. There are degenerative changes in the thoracic spine.    IMPRESSION: Cardiomegaly.    No focal infiltrates.    --- End of Report ---            KYLIE BORJAS MD; Attending Radiologist  This document has been electronically signed. 2021  3:46PM    < end of copied text >    Imaging Personally Reviewed:  YES    Consultant(s) Notes Reviewed:   YES    Care Discussed with Consultants : nephrology , pulmonology     Plan of care was discussed with patient and /or primary care giver; all questions and concerns were addressed and care was aligned with patient's wishes.

## 2021-11-08 NOTE — PROGRESS NOTE ADULT - PROBLEM SELECTOR PLAN 3
- Patient c/o body aches, productive cough and generalized weakness   - Rhinovirus positive, no SIRS criteria   - Albuterol PRN, Tylenol, Robitussin   - F/u legionella, sputum culture, mycoplasma  - Supportive measures - Patient c/o body aches, productive cough and generalized weakness   - Rhinovirus positive, no SIRS criteria   - Albuterol PRN, Tylenol, Robitussin   -Dr. Pizarro is following   - F/u legionella, sputum culture, mycoplasma  - Supportive measures

## 2021-11-08 NOTE — PROGRESS NOTE ADULT - SUBJECTIVE AND OBJECTIVE BOX
[facility] NEPHROLOGY- PROGRESS NOTE    Patient is a 89yo Female with CAD s/p CABG + stents, Afib on Eliquis came to ED c/o 2 weeks of productive cough. Pt found to have Rhino/Enterovirus. Pt found to have Hyponatremia and Hypokalemia. Nephrology consulted for electrolyte abnormalities.     Hospital Medications: Medications reviewed.  REVIEW OF SYSTEMS:  CONSTITUTIONAL: No fevers or chills  RESPIRATORY: mild shortness of breath, +cough with whitish/ pale yellow sputum  CARDIOVASCULAR: No chest pain.  GASTROINTESTINAL: No nausea, vomiting, diarrhea or abdominal pain. +constipated and states she can't eat much.   VASCULAR: No left lower extremity edema. , +chronic RLE edema    VITALS:  T(F): 98.9 (21 @ 13:38), Max: 98.9 (21 @ 13:38)  HR: 67 (21 @ 13:38)  BP: 114/57 (21 @ 13:38)  RR: 18 (21 @ 13:38)  SpO2: 96% (21 @ 13:38)  Wt(kg): --  Height (cm): 152.4 ( @ 16:02)  Weight (kg): 72.6 ( @ 16:02)  BMI (kg/m2): 31.3 ( @ 16:02)  BSA (m2): 1.7 ( @ 16:02)    PHYSICAL EXAM:  Gen: NAD,   HEENT: anicteric  Neck: no JVD  Cards: irregular, +S1/S2,  Resp: +coughing   +decreased Rt base  GI: soft, NT/ND, NABS  Extremities: no LLE edema, +mild RLE edema (vein grafting scar)      LABS:      130<L>  |  94<L>  |  29<H>  ----------------------------<  91  3.7   |  32<H>  |  1.20    Ca    9.5      2021 05:37  Phos  2.5       Mg     2.6         TPro  7.6  /  Alb  3.0<L>  /  TBili  0.5  /  DBili  0.2  /  AST  35  /  ALT  22  /  AlkPhos  64      Creatinine Trend: 1.20 <--, 1.07 <--, 1.01 <--, 0.93 <--, 0.99 <--, 1.15 <--                        10.1   5.24  )-----------( 261      ( 2021 05:37 )             31.5     Urine Studies:  Urinalysis Basic - ( 2021 22:26 )    Color: Yellow / Appearance: Clear / S.005 / pH:   Gluc:  / Ketone: Negative  / Bili: Negative / Urobili: Negative   Blood:  / Protein: 15 / Nitrite: Negative   Leuk Esterase: Negative / RBC: 0-2 /HPF / WBC 0-2 /HPF   Sq Epi:  / Non Sq Epi: Moderate /HPF / Bacteria: Few /HPF      Sodium, Random Urine: 10 mmol/L ( @ 20:27)  Osmolality, Random Urine: 365 mos/kg ( @ 20:27)    RADIOLOGY & ADDITIONAL STUDIES:

## 2021-11-08 NOTE — PROGRESS NOTE ADULT - PROBLEM SELECTOR PLAN 7
IMPROVE VTE Individual Risk Assessment  RISK                                                                Points  [  ] Previous VTE                                                  3  [  ] Thrombophilia                                               2  [  ] Lower limb paralysis                                      2        (unable to hold up >15 seconds)    [  ] Current Cancer                                              2         (within 6 months)  [  ] Immobilization > 24 hrs                                1  [  ] ICU/CCU stay > 24 hours                              1  [  ] Age > 60                                                      1  IMPROVE VTE Score _________    PPI for GI prophylaxis  On Eliquis full AC PPI for GI prophylaxis  On Eliquis full AC

## 2021-11-08 NOTE — PROGRESS NOTE ADULT - ASSESSMENT
Patient is a 89yo Female with CAD s/p CABG + stents, Afib on Eliquis came to ED c/o 2 weeks of productive cough. Pt found to have Rhino/Enterovirus. Pt found to have Hyponatremia and Hypokalemia. Nephrology consulted for electrolyte abnormalities.     1. Hyponatremia- likely hypovolemic in the setting of decreased PO intake with metolazone/ lasix use. Pt with elevated CO2; likely contracted. Hold diuretics james metolazone due to hyponatremia. Sosm 274, Urine osm 365 and urine Na 10. c/w NS @ 60cc/hr x12 hrs. Consider ensure to increase osmolar intake.  Check TSH and AM Cortisol. Monitor serial BMP's and UO for overcorrection. Do not correct more than 8 meQ/24 hours. Monitor serum sodium.    2. Hypokalemia- in the setting of diuretic use and decreased PO intake. Resolved s/p repletion Monitor serum K/Mg/Phos daily.     3. CKD-3a- age appropriate renal function, mild increase; will monitor for now.  UA with 15 protein, no blood. Defer secondary w/u due to advanced age. Strict I/Os. Avoid nephrotoxins/ NSAIDs/ RCA. Monitor BMP.    4. HTN 2/2 CKD- BP controlled. Continue with current anti-hypertensive medications. Monitor BP.    5. Rhino/Enterovirus- supportive care. f/u CXR. c/w Robitussin      [facility] NEPHROLOGY  Jayant Mahajan M.D.  Scott Starks D.O.  January Arrieta M.D.  Maira Hdz, MSN, ANP-C  [phone]    [address] [place], [place] [id number]

## 2021-11-08 NOTE — PROGRESS NOTE ADULT - PROBLEM SELECTOR PLAN 6
- Patient has hx of CAD on BB at home   - C/w home meds
- Patient has hx of CAD on BB at home   - C/w home meds

## 2021-11-08 NOTE — PROGRESS NOTE ADULT - PROBLEM SELECTOR PLAN 4
- Patient with trace bilateral LE edema R>L, no signs of infection or open wounds   - F/u doppler of LE to r/o DVT  - Patient is on full AC - Patient with trace bilateral LE edema R>L, no signs of infection or open wounds   - F/u doppler of LE to r/o DVT  - Patient is on full AC  - pt is taking lasix 40mg BID and another lasix 40mg QOD at home  - need to adjust lasix dose based on Cr and sodium level prior to DC

## 2021-11-08 NOTE — PROGRESS NOTE ADULT - PROBLEM SELECTOR PLAN 5
- Patient has hx of atrial fibrillation on Eliquis and metoprolol at home   - EKG showed controlled A fib, CXR unremarkable   - C/w home meds
- Patient has hx of atrial fibrillation on Eliquis and metoprolol at home   - EKG showed controlled A fib, CXR unremarkable   - C/w home meds

## 2021-11-08 NOTE — PROGRESS NOTE ADULT - PROBLEM SELECTOR PLAN 2
- Patient presented with asymptomatic hypokalemia K 2.8 most likely poor oral intake and diuretic use  - NAG Metabolic alkalosis   - K replaced orally and IVPB  - Monitor electrolytes and replace as needed  - Nephrology Dr Arrieta consulted - Patient presented with asymptomatic hypokalemia K 2.8 most likely poor oral intake and diuretic use  - NAG Metabolic alkalosis   - K replaced orally and IVPB : corrected from today;s labs   - Monitor electrolytes and replace as needed  - Nephrology Dr Arrieta consulted  - f/u BMP in AM

## 2021-11-08 NOTE — PROGRESS NOTE ADULT - PROBLEM SELECTOR PLAN 1
- Patient presented moderate hyponatremia  - Goal is to increase 4-6 mEq in 24 hrs, avoid overcorrection  - Most likely hypovolemic hyponatremia in the setting of acute viral infection and diuretic use   - F/u serum osmolarity, urine osmolarity, Urine lytes   - Monitor BMP q4-6 hrs   - Monitor intake and output   - Seizure and aspiration precautions  - Nephrology Dr Arrieta consulted - Patient presented moderate hyponatremia  - Goal is to increase 4-6 mEq in 24 hrs, avoid overcorrection  - Most likely hypovolemic hyponatremia in the setting of acute viral infection and diuretic use   - serum osmolarity, urine osmolarity, Urine lytes  - unremarkable   - Monitor BMP in AM  - Monitor intake and output   - Seizure and aspiration precautions  - Nephrology Dr Arrieta consulted - continue NS at 60cc /hr x 12 hours   -  today

## 2021-11-09 ENCOUNTER — TRANSCRIPTION ENCOUNTER (OUTPATIENT)
Age: 86
End: 2021-11-09

## 2021-11-09 VITALS
RESPIRATION RATE: 20 BRPM | SYSTOLIC BLOOD PRESSURE: 116 MMHG | TEMPERATURE: 98 F | OXYGEN SATURATION: 96 % | DIASTOLIC BLOOD PRESSURE: 62 MMHG | HEART RATE: 72 BPM

## 2021-11-09 LAB
ANION GAP SERPL CALC-SCNC: 6 MMOL/L — SIGNIFICANT CHANGE UP (ref 5–17)
BUN SERPL-MCNC: 30 MG/DL — HIGH (ref 7–18)
CALCIUM SERPL-MCNC: 9.7 MG/DL — SIGNIFICANT CHANGE UP (ref 8.4–10.5)
CHLORIDE SERPL-SCNC: 95 MMOL/L — LOW (ref 96–108)
CO2 SERPL-SCNC: 30 MMOL/L — SIGNIFICANT CHANGE UP (ref 22–31)
CORTIS AM PEAK SERPL-MCNC: 21.7 UG/DL — HIGH (ref 6–18.4)
CREAT SERPL-MCNC: 1.01 MG/DL — SIGNIFICANT CHANGE UP (ref 0.5–1.3)
GLUCOSE SERPL-MCNC: 98 MG/DL — SIGNIFICANT CHANGE UP (ref 70–99)
POTASSIUM SERPL-MCNC: 3.3 MMOL/L — LOW (ref 3.5–5.3)
POTASSIUM SERPL-SCNC: 3.3 MMOL/L — LOW (ref 3.5–5.3)
SODIUM SERPL-SCNC: 131 MMOL/L — LOW (ref 135–145)

## 2021-11-09 PROCEDURE — 80053 COMPREHEN METABOLIC PANEL: CPT

## 2021-11-09 PROCEDURE — 85025 COMPLETE CBC W/AUTO DIFF WBC: CPT

## 2021-11-09 PROCEDURE — 87449 NOS EACH ORGANISM AG IA: CPT

## 2021-11-09 PROCEDURE — 36415 COLL VENOUS BLD VENIPUNCTURE: CPT

## 2021-11-09 PROCEDURE — 80048 BASIC METABOLIC PNL TOTAL CA: CPT

## 2021-11-09 PROCEDURE — 94640 AIRWAY INHALATION TREATMENT: CPT

## 2021-11-09 PROCEDURE — 83930 ASSAY OF BLOOD OSMOLALITY: CPT

## 2021-11-09 PROCEDURE — 85610 PROTHROMBIN TIME: CPT

## 2021-11-09 PROCEDURE — 81001 URINALYSIS AUTO W/SCOPE: CPT

## 2021-11-09 PROCEDURE — 93970 EXTREMITY STUDY: CPT

## 2021-11-09 PROCEDURE — 85730 THROMBOPLASTIN TIME PARTIAL: CPT

## 2021-11-09 PROCEDURE — 97161 PT EVAL LOW COMPLEX 20 MIN: CPT

## 2021-11-09 PROCEDURE — 80076 HEPATIC FUNCTION PANEL: CPT

## 2021-11-09 PROCEDURE — 93005 ELECTROCARDIOGRAM TRACING: CPT

## 2021-11-09 PROCEDURE — 83735 ASSAY OF MAGNESIUM: CPT

## 2021-11-09 PROCEDURE — 70450 CT HEAD/BRAIN W/O DYE: CPT

## 2021-11-09 PROCEDURE — 84300 ASSAY OF URINE SODIUM: CPT

## 2021-11-09 PROCEDURE — 87086 URINE CULTURE/COLONY COUNT: CPT

## 2021-11-09 PROCEDURE — 85027 COMPLETE CBC AUTOMATED: CPT

## 2021-11-09 PROCEDURE — 82533 TOTAL CORTISOL: CPT

## 2021-11-09 PROCEDURE — 0225U NFCT DS DNA&RNA 21 SARSCOV2: CPT

## 2021-11-09 PROCEDURE — 86769 SARS-COV-2 COVID-19 ANTIBODY: CPT

## 2021-11-09 PROCEDURE — 84100 ASSAY OF PHOSPHORUS: CPT

## 2021-11-09 PROCEDURE — 84443 ASSAY THYROID STIM HORMONE: CPT

## 2021-11-09 PROCEDURE — 83935 ASSAY OF URINE OSMOLALITY: CPT

## 2021-11-09 PROCEDURE — 99285 EMERGENCY DEPT VISIT HI MDM: CPT | Mod: 25

## 2021-11-09 PROCEDURE — 71046 X-RAY EXAM CHEST 2 VIEWS: CPT

## 2021-11-09 PROCEDURE — 87040 BLOOD CULTURE FOR BACTERIA: CPT

## 2021-11-09 RX ORDER — POTASSIUM CHLORIDE 20 MEQ
40 PACKET (EA) ORAL ONCE
Refills: 0 | Status: COMPLETED | OUTPATIENT
Start: 2021-11-09 | End: 2021-11-09

## 2021-11-09 RX ORDER — ALBUTEROL 90 UG/1
2 AEROSOL, METERED ORAL
Qty: 1 | Refills: 0
Start: 2021-11-09 | End: 2021-12-08

## 2021-11-09 RX ORDER — FUROSEMIDE 40 MG
1 TABLET ORAL
Qty: 0 | Refills: 0 | DISCHARGE

## 2021-11-09 RX ORDER — SENNA PLUS 8.6 MG/1
2 TABLET ORAL
Qty: 60 | Refills: 0
Start: 2021-11-09 | End: 2021-12-08

## 2021-11-09 RX ADMIN — PANTOPRAZOLE SODIUM 40 MILLIGRAM(S): 20 TABLET, DELAYED RELEASE ORAL at 05:43

## 2021-11-09 RX ADMIN — Medication 40 MILLIEQUIVALENT(S): at 11:22

## 2021-11-09 RX ADMIN — ALBUTEROL 2 PUFF(S): 90 AEROSOL, METERED ORAL at 02:58

## 2021-11-09 RX ADMIN — Medication 200 MILLIGRAM(S): at 11:25

## 2021-11-09 RX ADMIN — Medication 25 MILLIGRAM(S): at 05:43

## 2021-11-09 RX ADMIN — APIXABAN 2.5 MILLIGRAM(S): 2.5 TABLET, FILM COATED ORAL at 05:43

## 2021-11-09 RX ADMIN — Medication 650 MILLIGRAM(S): at 03:45

## 2021-11-09 RX ADMIN — Medication 100 MILLIGRAM(S): at 05:43

## 2021-11-09 RX ADMIN — AMIODARONE HYDROCHLORIDE 200 MILLIGRAM(S): 400 TABLET ORAL at 11:22

## 2021-11-09 RX ADMIN — Medication 650 MILLIGRAM(S): at 02:59

## 2021-11-09 NOTE — DISCHARGE NOTE PROVIDER - NSDCCPCAREPLAN_GEN_ALL_CORE_FT
PRINCIPAL DISCHARGE DIAGNOSIS  Diagnosis: Hyponatremia  Assessment and Plan of Treatment: You were followed by a Nephrologist while you were hospitalized  YOUR LASIX AND METOLAZONE HAS BEEN DISCONTINUED FOR NOW  UNTIL FOLLOW UP WITH DR ARRIETA IN 1 WEEK; SHE WILL FURTHER ADVISE YOU ON WHETHER/WHEN TO RESUME   - Follow up with Dr Arrieta within 1 week  Continue to follow up with your PMD for continuity of care      SECONDARY DISCHARGE DIAGNOSES  Diagnosis: Hypokalemia  Assessment and Plan of Treatment: Your potassium level was low and you received supplementation;  Please follow up with your PMD and or Dr Arrieta within 1 week for continued care including repeat blood work    Diagnosis: Rhinovirus  Assessment and Plan of Treatment: Supportive care measures were employed with noted improvement  Continue cough medications and bronchodilator therapy as prescribed  Follow up with your PMD within 1 week for continued care    Diagnosis: Atrial fibrillation  Assessment and Plan of Treatment: Atrial fibrillation is the most common heart rhythm problem & has the risk of stroke & heart attack  It helps if you control your blood pressure, not drink more than 1-2 alcohol drinks per day, cut down on caffeine, getting treatment for over active thyroid gland, & getting exercise  Call your doctor if you feel your heart racing or beating unusually, chest tightness or pain, lightheaded, faint, shortness of breath especially with exercise  It is important to take your heart medication as prescribed  You may be on anticoagulation which is very important to take as directed - you may need blood work to monitor drug levels    Diagnosis: Swelling of right lower extremity  Assessment and Plan of Treatment: A doppler was performed and reveale that you do not have a clot in the affected extremity  Continue to follow up with your PMD for continuity of care     PRINCIPAL DISCHARGE DIAGNOSIS  Diagnosis: Hyponatremia  Assessment and Plan of Treatment: You were followed by a Nephrologist while you were hospitalized  YOUR LASIX AND METOLAZONE HAS BEEN DISCONTINUED FOR NOW  UNTIL FOLLOW UP WITH DR ARRIETA IN 1 WEEK; SHE WILL FURTHER ADVISE YOU ON WHETHER/WHEN TO RESUME   - Follow up with Dr Arrieta within 1 week  Continue to follow up with your PMD for continuity of care      SECONDARY DISCHARGE DIAGNOSES  Diagnosis: Hypokalemia  Assessment and Plan of Treatment: Your potassium level was low and you received supplementation;  Please follow up with your PMD and or Dr Arrieta within 1 week for continued care including repeat blood work    Diagnosis: Rhinovirus  Assessment and Plan of Treatment: Supportive care measures were employed with noted improvement  Continue cough medications and bronchodilator therapy as prescribed  Follow up with your PMD within 1 week for continued care    Diagnosis: Atrial fibrillation  Assessment and Plan of Treatment: Atrial fibrillation is the most common heart rhythm problem & has the risk of stroke & heart attack  It helps if you control your blood pressure, not drink more than 1-2 alcohol drinks per day, cut down on caffeine, getting treatment for over active thyroid gland, & getting exercise  Call your doctor if you feel your heart racing or beating unusually, chest tightness or pain, lightheaded, faint, shortness of breath especially with exercise  It is important to take your heart medication as prescribed  You may be on anticoagulation which is very important to take as directed - you may need blood work to monitor drug levels  Follow up with Dr Goldberg for continued care    Diagnosis: Swelling of right lower extremity  Assessment and Plan of Treatment: A doppler was performed and reveale that you do not have a clot in the affected extremity  Continue to follow up with your PMD for continuity of care    Diagnosis: CKD (chronic kidney disease)  Assessment and Plan of Treatment: Age appropriate CKD;  You were followed by Nephrology inpatient; please follow up with MD as advised  Avoid taking (NSAIDs) - (ex: Ibuprofen, Advil, Celebrex, Naprosyn)  Avoid taking any nephrotoxic agents (can harm kidneys) - Intravenous contrast for diagnostic testing, combination cold medications.  Have all medications adjusted for your renal function by your Health Care Provider.  Blood pressure control is important.  Take all medication as prescribed.     PRINCIPAL DISCHARGE DIAGNOSIS  Diagnosis: Hyponatremia  Assessment and Plan of Treatment: You were followed by a Nephrologist while you were hospitalized  YOUR LASIX AND METOLAZONE HAS BEEN DISCONTINUED FOR NOW  UNTIL FOLLOW UP WITH DR ARRIETA IN 1 WEEK; SHE WILL FURTHER ADVISE YOU ON WHETHER/WHEN TO RESUME   - Follow up with Dr Arrieta within 1 week  Continue to follow up with your PMD for continuity of care      SECONDARY DISCHARGE DIAGNOSES  Diagnosis: Hypokalemia  Assessment and Plan of Treatment: Your potassium level was low and you received supplementation;  Please follow up with your PMD and or Dr Arrieta within 1 week for continued care including repeat blood work    Diagnosis: Rhinovirus  Assessment and Plan of Treatment: You were seen by a pulmonologist while hospitalized  Supportive care measures were employed with noted improvement  Continue cough medications and bronchodilator therapy as prescribed  Follow up with your PMD within 1 week for continued care  Follow up with Pulmonologist, Dr Pizarro within 1-2 weeks of discharge    Diagnosis: Atrial fibrillation  Assessment and Plan of Treatment: Atrial fibrillation is the most common heart rhythm problem & has the risk of stroke & heart attack  It helps if you control your blood pressure, not drink more than 1-2 alcohol drinks per day, cut down on caffeine, getting treatment for over active thyroid gland, & getting exercise  Call your doctor if you feel your heart racing or beating unusually, chest tightness or pain, lightheaded, faint, shortness of breath especially with exercise  It is important to take your heart medication as prescribed  You may be on anticoagulation which is very important to take as directed - you may need blood work to monitor drug levels  Follow up with Dr Goldberg for continued care    Diagnosis: Swelling of right lower extremity  Assessment and Plan of Treatment: A doppler was performed and reveale that you do not have a clot in the affected extremity  Continue to follow up with your PMD for continuity of care    Diagnosis: CKD (chronic kidney disease)  Assessment and Plan of Treatment: Age appropriate CKD;  You were followed by Nephrology inpatient; please follow up with MD as advised  Avoid taking (NSAIDs) - (ex: Ibuprofen, Advil, Celebrex, Naprosyn)  Avoid taking any nephrotoxic agents (can harm kidneys) - Intravenous contrast for diagnostic testing, combination cold medications.  Have all medications adjusted for your renal function by your Health Care Provider.  Blood pressure control is important.  Take all medication as prescribed.

## 2021-11-09 NOTE — DISCHARGE NOTE PROVIDER - PROVIDER TOKENS
PROVIDER:[TOKEN:[[id number]],FOLLOWUP:[1 week]],PROVIDER:[TOKEN:[[id number]],FOLLOWUP:[1 week]],PROVIDER:[TOKEN:[[id number]],FOLLOWUP:[2 weeks]] PROVIDER:[TOKEN:[[id number]],FOLLOWUP:[1 week]],PROVIDER:[TOKEN:[[id number]],FOLLOWUP:[1 week]],PROVIDER:[TOKEN:[[id number]],FOLLOWUP:[2 weeks]],PROVIDER:[TOKEN:[[id number]],FOLLOWUP:[2 weeks]]

## 2021-11-09 NOTE — PROGRESS NOTE ADULT - REASON FOR ADMISSION
HYPONATREMIA AND HYPOKALEMIA

## 2021-11-09 NOTE — PROGRESS NOTE ADULT - SUBJECTIVE AND OBJECTIVE BOX
Time of Visit:  Patient seen and examined.     MEDICATIONS  (STANDING):  aMIOdarone    Tablet 200 milliGRAM(s) Oral <User Schedule>  apixaban 2.5 milliGRAM(s) Oral two times a day  benzonatate 100 milliGRAM(s) Oral every 8 hours  influenza  Vaccine (HIGH DOSE) 0.7 milliLiter(s) IntraMuscular once  melatonin 5 milliGRAM(s) Oral at bedtime  metoprolol succinate ER 25 milliGRAM(s) Oral daily  pantoprazole    Tablet 40 milliGRAM(s) Oral before breakfast  senna 2 Tablet(s) Oral at bedtime  sodium chloride 0.9%. 1000 milliLiter(s) (60 mL/Hr) IV Continuous <Continuous>      MEDICATIONS  (PRN):  acetaminophen     Tablet .. 650 milliGRAM(s) Oral every 6 hours PRN Temp greater or equal to 38C (100.4F), Mild Pain (1 - 3)  ALBUTerol    90 MICROgram(s) HFA Inhaler 2 Puff(s) Inhalation every 6 hours PRN Shortness of Breath and/or Wheezing  bisacodyl 5 milliGRAM(s) Oral every 12 hours PRN Constipation  guaiFENesin Oral Liquid (Sugar-Free) 200 milliGRAM(s) Oral every 6 hours PRN Cough       Medications up to date at time of exam.      PHYSICAL EXAMINATION:  Patient has no new complaints.  GENERAL: The patient is a well-developed, well-nourished, in no apparent distress.     Vital Signs Last 24 Hrs  T(C): 36.4 (2021 06:09), Max: 36.4 (2021 21:00)  T(F): 97.5 (2021 06:09), Max: 97.6 (2021 21:00)  HR: 62 (2021 06:09) (62 - 63)  BP: 113/72 (2021 06:09) (100/63 - 113/72)  BP(mean): --  RR: 17 (2021 06:09) (17 - 18)  SpO2: 96% (2021 06:09) (95% - 96%)   (if applicable)    Chest Tube (if applicable)    HEENT: Head is normocephalic and atraumatic. Extraocular muscles are intact. Mucous membranes are moist.     NECK: Supple, no palpable adenopathy.    LUNGS: Clear to auscultation, no wheezing, rales, or rhonchi.    HEART: Regular rate and rhythm without murmur.    ABDOMEN: Soft, nontender, and nondistended.  No hepatosplenomegaly is noted.    : No painful voiding, no pelvic pain    EXTREMITIES: Without any cyanosis, clubbing, rash, lesions or edema.    NEUROLOGIC: Awake, alert, oriented, grossly intact    SKIN: Warm, dry, good turgor.      LABS:                        10.1   5.24  )-----------( 261      ( 2021 05:37 )             31.5     11-    131<L>  |  95<L>  |  30<H>  ----------------------------<  98  3.3<L>   |  30  |  1.01    Ca    9.7      2021 07:01  Phos  2.5     11-08  Mg     2.6     11-08    TPro  7.6  /  Alb  3.0<L>  /  TBili  0.5  /  DBili  0.2  /  AST  35  /  ALT  22  /  AlkPhos  64  11-07                        MICROBIOLOGY: (if applicable)    RADIOLOGY & ADDITIONAL STUDIES:  EKG:   CXR:  ECHO:    IMPRESSION: 89y Female PAST MEDICAL & SURGICAL HISTORY:  Hyperlipidemia, Acquired    Coronary artery disease    Atrial fibrillation, unspecified type    S/P Hysterectomy    History of Cardiac Cath    H/O: Hysterectomy    History of Cardiac Cath    H/O:  section  x2    S/P CABG (coronary artery bypass graft)     p/w           RECOMMENDATIONS:
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
## 2021-11-09 NOTE — DISCHARGE NOTE PROVIDER - CARE PROVIDER_API CALL
January Arrieta)  Internal Medicine  [address] [place] [address]  Phone: [phone]  Fax: [phone]  Follow Up Time: 1 week    Porfirio Hensley   MEDICINE  [address] [facility], [place] [address]  Phone: [phone]  Fax: [phone]  Follow Up Time: 1 week    Goldberg, Steven M (MD)  Cardiovascular Disease; Internal Medicine  [address] [place] [address]  Phone: [phone]  Fax: [phone]  Follow Up Time: 2 weeks   January Arrieta)  Internal Medicine  [address] [place] [address]  Phone: [phone]  Fax: [phone]  Follow Up Time: 1 week    Porfirio Hensley   MEDICINE  [address] [facility], [place] [address]  Phone: [phone]  Fax: [phone]  Follow Up Time: 1 week    Goldberg, Steven M (MD)  Cardiovascular Disease; Internal Medicine  [address] [place] [address]  Phone: [phone]  Fax: [phone]  Follow Up Time: 2 weeks    Julita Pizarro)  Internal Medicine  [address] [facility], [address]  [place] [address]  Phone: [phone]  Fax: [phone]  Follow Up Time: 2 weeks

## 2021-11-09 NOTE — DISCHARGE NOTE PROVIDER - NSDCMRMEDTOKEN_GEN_ALL_CORE_FT
albuterol 90 mcg/inh inhalation aerosol: 2 puff(s) inhaled every 6 hours, As needed, Shortness of Breath and/or Wheezing  amiodarone 200 mg oral tablet: 1 tab(s) orally once a day Monday and Tuesday   benzonatate 100 mg oral capsule: 1 cap(s) orally every 8 hours  -for cough   Eliquis 2.5 mg oral tablet: 1 tab(s) orally 2 times a day  guaiFENesin 100 mg/5 mL oral liquid: 10 milliliter(s) orally every 6 hours, As needed, Cough  lansoprazole 15 mg oral delayed release capsule: 1 cap(s) orally once a day  Metoprolol Succinate ER 25 mg oral tablet, extended release: 1 tab(s) orally once a day (at bedtime)  senna oral tablet: 2 tab(s) orally once a day (at bedtime)

## 2021-11-09 NOTE — DISCHARGE NOTE NURSING/CASE MANAGEMENT/SOCIAL WORK - PATIENT PORTAL LINK FT
You can access the FollowMyHealth Patient Portal offered by [facility] by registering at the following website: [email or web address]. By joining [facility]’s FollowMyHealth portal, you will also be able to view your health information using other applications (apps) compatible with our system.

## 2021-11-09 NOTE — DISCHARGE NOTE PROVIDER - CARE PROVIDERS DIRECT ADDRESSES
,DirectAddress_Unknown,[email or web address] ,[email or web address],DirectAddress_Unknown

## 2021-11-11 LAB
CULTURE RESULTS: SIGNIFICANT CHANGE UP
CULTURE RESULTS: SIGNIFICANT CHANGE UP
SPECIMEN SOURCE: SIGNIFICANT CHANGE UP
SPECIMEN SOURCE: SIGNIFICANT CHANGE UP

## 2022-01-01 ENCOUNTER — NON-APPOINTMENT (OUTPATIENT)
Age: 87
End: 2022-01-01

## 2022-01-01 ENCOUNTER — APPOINTMENT (OUTPATIENT)
Dept: HOME HEALTH SERVICES | Facility: HOME HEALTH | Age: 87
End: 2022-01-01

## 2022-01-01 ENCOUNTER — LABORATORY RESULT (OUTPATIENT)
Age: 87
End: 2022-01-01

## 2022-01-01 ENCOUNTER — TRANSCRIPTION ENCOUNTER (OUTPATIENT)
Age: 87
End: 2022-01-01

## 2022-01-01 VITALS
TEMPERATURE: 97.9 F | DIASTOLIC BLOOD PRESSURE: 60 MMHG | RESPIRATION RATE: 16 BRPM | OXYGEN SATURATION: 97 % | HEART RATE: 70 BPM | SYSTOLIC BLOOD PRESSURE: 130 MMHG

## 2022-01-01 DIAGNOSIS — R11.0 NAUSEA: ICD-10-CM

## 2022-01-01 DIAGNOSIS — M25.551 PAIN IN RIGHT HIP: ICD-10-CM

## 2022-01-01 DIAGNOSIS — Z23 ENCOUNTER FOR IMMUNIZATION: ICD-10-CM

## 2022-01-01 DIAGNOSIS — I25.10 ATHEROSCLEROTIC HEART DISEASE OF NATIVE CORONARY ARTERY W/OUT ANGINA PECTORIS: ICD-10-CM

## 2022-01-01 DIAGNOSIS — K59.09 OTHER CONSTIPATION: ICD-10-CM

## 2022-01-01 DIAGNOSIS — R26.81 UNSTEADINESS ON FEET: ICD-10-CM

## 2022-01-01 DIAGNOSIS — E87.6 HYPOKALEMIA: ICD-10-CM

## 2022-01-01 PROCEDURE — 99349 HOME/RES VST EST MOD MDM 40: CPT | Mod: 25

## 2022-01-01 PROCEDURE — G0008: CPT

## 2022-01-01 PROCEDURE — 90662 IIV NO PRSV INCREASED AG IM: CPT

## 2022-01-01 RX ORDER — GLUCOSAMINE HCL/CHONDROITIN SU 500-400 MG
3 CAPSULE ORAL
Qty: 90 | Refills: 3 | Status: COMPLETED | COMMUNITY
Start: 2022-02-15 | End: 2022-01-01

## 2022-01-01 RX ORDER — POTASSIUM CHLORIDE 1500 MG/1
20 TABLET, FILM COATED, EXTENDED RELEASE ORAL
Qty: 180 | Refills: 3 | Status: COMPLETED | COMMUNITY
Start: 2022-03-06 | End: 2022-01-01

## 2022-01-01 RX ORDER — AMIODARONE HYDROCHLORIDE 100 MG/1
100 TABLET ORAL
Qty: 108 | Refills: 3 | Status: COMPLETED | COMMUNITY
Start: 2022-02-15 | End: 2022-01-01

## 2022-01-01 RX ORDER — ONDANSETRON 4 MG/1
4 TABLET, ORALLY DISINTEGRATING ORAL
Qty: 90 | Refills: 3 | Status: COMPLETED | COMMUNITY
Start: 2022-01-01 | End: 2022-01-01

## 2022-01-01 RX ORDER — LANSOPRAZOLE 15 MG/1
15 CAPSULE, DELAYED RELEASE ORAL
Qty: 90 | Refills: 3 | Status: COMPLETED | COMMUNITY
Start: 2022-03-06 | End: 2022-01-01

## 2022-02-02 ENCOUNTER — FORM ENCOUNTER (OUTPATIENT)
Age: 87
End: 2022-02-02

## 2022-02-14 ENCOUNTER — NON-APPOINTMENT (OUTPATIENT)
Age: 87
End: 2022-02-14

## 2022-02-15 ENCOUNTER — APPOINTMENT (OUTPATIENT)
Dept: HOME HEALTH SERVICES | Facility: HOME HEALTH | Age: 87
End: 2022-02-15
Payer: MEDICARE

## 2022-02-15 VITALS
SYSTOLIC BLOOD PRESSURE: 127 MMHG | HEART RATE: 67 BPM | OXYGEN SATURATION: 98 % | RESPIRATION RATE: 16 BRPM | DIASTOLIC BLOOD PRESSURE: 86 MMHG

## 2022-02-15 DIAGNOSIS — Z82.3 FAMILY HISTORY OF STROKE: ICD-10-CM

## 2022-02-15 DIAGNOSIS — Z82.49 FAMILY HISTORY OF ISCHEMIC HEART DISEASE AND OTHER DISEASES OF THE CIRCULATORY SYSTEM: ICD-10-CM

## 2022-02-15 DIAGNOSIS — K21.9 GASTRO-ESOPHAGEAL REFLUX DISEASE W/OUT ESOPHAGITIS: ICD-10-CM

## 2022-02-15 DIAGNOSIS — R60.0 LOCALIZED EDEMA: ICD-10-CM

## 2022-02-15 DIAGNOSIS — F51.04 PSYCHOPHYSIOLOGIC INSOMNIA: ICD-10-CM

## 2022-02-15 DIAGNOSIS — Z82.0 FAMILY HISTORY OF EPILEPSY AND OTHER DISEASES OF THE NERVOUS SYSTEM: ICD-10-CM

## 2022-02-15 DIAGNOSIS — Z84.1 FAMILY HISTORY OF DISORDERS OF KIDNEY AND URETER: ICD-10-CM

## 2022-02-15 DIAGNOSIS — Z87.891 PERSONAL HISTORY OF NICOTINE DEPENDENCE: ICD-10-CM

## 2022-02-15 DIAGNOSIS — Z81.8 FAMILY HISTORY OF OTHER MENTAL AND BEHAVIORAL DISORDERS: ICD-10-CM

## 2022-02-15 PROCEDURE — 99345 HOME/RES VST NEW HIGH MDM 75: CPT | Mod: 25

## 2022-02-15 PROCEDURE — G0506: CPT

## 2022-03-06 PROBLEM — F51.04 CHRONIC INSOMNIA: Status: ACTIVE | Noted: 2022-02-15

## 2022-03-06 PROBLEM — R60.0 BILATERAL LOWER EXTREMITY EDEMA: Status: ACTIVE | Noted: 2022-02-15

## 2022-03-06 PROBLEM — K21.9 CHRONIC GERD: Status: ACTIVE | Noted: 2022-03-06

## 2022-03-06 NOTE — REASON FOR VISIT
[Initial Evaluation] : an initial evaluation [Pre-Visit Preparation] : pre-visit preparation was done [Intercurrent Specialty/Sub-specialty Visits] : the patient has intercurrent specialty/sub-specialty visits [FreeTextEntry1] : afib, CKD III

## 2022-03-06 NOTE — HISTORY OF PRESENT ILLNESS
[Patient] : patient [FreeTextEntry1] : Patient is not homebound.  [FreeTextEntry2] : Patient denies fever, cough, trouble breathing, rash and vomiting. Patient has not been in close contact with someone who is COVID positive. N95 mask, gloves and eye wear worn during visit: Y \par Total face to face time with patient: 10 minutes. \par \par 89 yo F w/ PMHx CAD s/p CABG () followed by stents, afib (in Eliquis), pulmonary HTN \par \par -21: a/w Rhinovirus and RLE edema (DVT negative). CKD, Hyponatremia improved. Needs f/u w/ renal (Dr. Longo) \par \par Will f/u renal panel in a few weeks.\par \par CHF:Very occasional GODFREY when walks up stairs or walks short distance. Sometimes has BLE edema, controlled on lasix and metolazone. Follows weight fairly regularly. \par Chronic headaches: describes pressure headaches  on forehead and eyes, started after hospitalization in 2021, has had these headaches since , Comes and goes, Lasts hours. HAs are subsiding since November, mild discomfort at night. Takes two tylenol tablets at night, which helps. \par \par Appetite/dysphagia//dentures: Decreased. Lost interest in / taste of food. Very light breakfast and lunch. Eats mostly at night. Has bottom dentures. Denies dysphagia. Weight stable. \par Constipation: Miralax daily, Senna q2-3 days. Has soft BMs q2-3 days. \par Urine: No sx. \par Chronic insomnia: Present x1 year. Sometimes sleeps well, sometimes does not sleep well. Sleeps on recliner. Watches TV and then nods off at about ~900 PM, then wakes up at 3AM. Thinks she sleeps about 4 h at night. Has trouble sleeping after sleeping when watching TV.  Does not nap. \par Mood: normal variability, sometimes feels depressed due to son's passing, sometimes argues w/ daughter \par Memory: Intact. \par Chronic pruritus present x1 month, only on anterior shoulders and chest, happens at night, will stop using new sheets softener. \par Falls: Fell backwards. Hit head / ribs. One fall 3 weeks ago. \par DME:Needs commode as goes downstairs to bathroom and has unsteady gait. \par \par Denies cough, fever, NV, pain. Has decreased energy from previously. \par \par Has received PNA vaccine \par Wishes to discuss COVID booster at next visit. \par \par Specialists: \par Cardiologist: Dr. Alexei Ballard \par Hasn't seen former PCP in > year \par \par Social hx: \par . \par Son  in . \par Dtr Stephanie is HCP. \par Grew up in [place]. \par Grandparents from [facility] \par \par MOLST: Full code \par HCP: Dtr Stephanie

## 2022-03-06 NOTE — CHRONIC CARE ASSESSMENT
[PPS Score: ____] : Palliative Performance Scale (PPS) Score: [unfilled] [de-identified] : n/a  [de-identified] : decreased

## 2022-03-06 NOTE — COUNSELING
[Overweight - ( BMI 25.1 - 29.9 )] : overweight -  ( BMI 25.1 - 29.9 ) [Continue diet as tolerated] : continue diet as tolerated based on goals of care [Non - Smoker] : non-smoker [Smoke/CO Detectors] : smoke/CO detectors [Use assistive device to avoid falls] : use assistive device to avoid falls [Remove clutter and unsafe carpeting to avoid falls] : remove clutter and unsafe carpeting to avoid falls [] : foot exam [Not Recommended] : Aspirin use not recommended due to overall prognosis [Decrease hospital use] : decrease hospital use [Minimize unnecessary interventions] : minimize unnecessary interventions [Maintain functional ability] : maintain functional ability [Likely to achieve goals/desired outcomes] : likely to achieve goals/desired outcomes [Advanced Directives discussed: ____] : Advanced directives discussed: [unfilled] [Completed Healthcare Proxy] : completed healthcare proxy [Completed Medical Orders for Life-Sustaining Treatment] : completed medical orders for life-sustaining treatment [Full Code] : Code Status: Full Code [No Limitations] : Treatment Guidelines: No limitations [Trial of Intubation] : Intubation: Trial of Intubation [[facility] Date: _____] [facility]: [unfilled] [_____] : HCP: [unfilled] [ - New patient with 2 or more chronic conditions; CCM discussed and patient-centered care plan established] : New patient with 2 or more chronic conditions; CCM discussed and patient-centered care plan established

## 2022-03-06 NOTE — PHYSICAL EXAM
[No Acute Distress] : no acute distress [Well Nourished] : well nourished [Well Developed] : well developed [Normal Sclera/Conjunctiva] : normal sclera/conjunctiva [PERRL] : pupils equal, round and reactive to light [EOMI] : extra ocular movement intact [Normal Outer Ear/Nose] : the ears and nose were normal in appearance [Normal Oropharynx] : the oropharynx was normal [Supple] : the neck was supple [No Respiratory Distress] : no respiratory distress [Clear to Auscultation] : lungs were clear to auscultation bilaterally [Normal Rate] : heart rate was normal  [No Accessory Muscle Use] : no accessory muscle use [Regular Rhythm] : with a regular rhythm [Normal S1, S2] : normal S1 and S2 [Soft] : abdomen soft [Non Tender] : non-tender [Not Distended] : not distended [No Joint Swelling] : no joint swelling seen [No Skin Lesions] : no skin lesions [No Motor Deficits] : the motor exam was normal [No Gross Sensory Deficits] : no gross sensory deficits [Normal Affect] : the affect was normal [Oriented x3] : oriented to person, place, and time [Normal Mood] : the mood was normal [de-identified] : unsteady gait

## 2022-03-06 NOTE — HEALTH RISK ASSESSMENT
[HRA Reviewed] : Health risk assessment reviewed [Independent] : managing finances [Some assistance needed] : housekeeping [Full assistance needed] : using transportation [One fall no injury in past year] : Patient reported one fall in the past year without injury [Yes] : The patient does have visual impairment [TimeGetUpGo] : 0 [de-identified] : Uses glasses

## 2022-03-10 LAB
ALBUMIN SERPL ELPH-MCNC: 4.7 G/DL
ANION GAP SERPL CALC-SCNC: 17 MMOL/L
BUN SERPL-MCNC: 56 MG/DL
CALCIUM SERPL-MCNC: 10.1 MG/DL
CHLORIDE SERPL-SCNC: 91 MMOL/L
CO2 SERPL-SCNC: 28 MMOL/L
CREAT SERPL-MCNC: 1.22 MG/DL
EGFR: 42 ML/MIN/1.73M2
GLUCOSE SERPL-MCNC: 86 MG/DL
PHOSPHATE SERPL-MCNC: 3.4 MG/DL
POTASSIUM SERPL-SCNC: 3.6 MMOL/L
SODIUM SERPL-SCNC: 137 MMOL/L

## 2022-03-11 ENCOUNTER — NON-APPOINTMENT (OUTPATIENT)
Age: 87
End: 2022-03-11

## 2022-03-23 ENCOUNTER — APPOINTMENT (OUTPATIENT)
Dept: HOME HEALTH SERVICES | Facility: HOME HEALTH | Age: 87
End: 2022-03-23

## 2022-04-30 ENCOUNTER — NON-APPOINTMENT (OUTPATIENT)
Age: 87
End: 2022-04-30

## 2022-05-31 ENCOUNTER — NON-APPOINTMENT (OUTPATIENT)
Age: 87
End: 2022-05-31

## 2022-06-08 ENCOUNTER — NON-APPOINTMENT (OUTPATIENT)
Age: 87
End: 2022-06-08

## 2022-06-09 ENCOUNTER — TRANSCRIPTION ENCOUNTER (OUTPATIENT)
Age: 87
End: 2022-06-09

## 2022-06-14 ENCOUNTER — NON-APPOINTMENT (OUTPATIENT)
Age: 87
End: 2022-06-14

## 2022-06-14 ENCOUNTER — APPOINTMENT (OUTPATIENT)
Dept: HOME HEALTH SERVICES | Facility: HOME HEALTH | Age: 87
End: 2022-06-14

## 2022-08-03 PROBLEM — M25.551 RIGHT HIP PAIN: Status: ACTIVE | Noted: 2022-01-01

## 2022-10-04 NOTE — ED ADULT NURSE NOTE - PAIN RATING/NUMBER SCALE (0-10): ACTIVITY
Impression: Vitreous degeneration, bilateral: H43.813. Plan: Discussed diagnosis in detail with patient. Reassured patient of current condition and treatment. Discussed signs and symptoms of PVD/floaters. There is no evidence of retinal pathology. All signs and risks of retinal detachment and tears were discussed in detail. Call if symptoms worsen or if new symptoms arise. ___ preformed at today's visit, baseline testing to monitor for any progressions or advancements or testing stable with no changes noted. 0

## 2022-10-21 PROBLEM — R11.0 NAUSEA WITHOUT VOMITING: Status: ACTIVE | Noted: 2022-01-01

## 2022-11-21 PROBLEM — R26.81 UNSTEADY GAIT: Status: ACTIVE | Noted: 2022-02-15

## 2022-11-21 PROBLEM — I25.10 CORONARY ARTERY DISEASE: Status: ACTIVE | Noted: 2022-02-15

## 2022-11-21 PROBLEM — E87.6 HYPOKALEMIA: Status: ACTIVE | Noted: 2022-02-15

## 2022-11-21 PROBLEM — K59.09 CHRONIC CONSTIPATION: Status: ACTIVE | Noted: 2022-02-15

## 2022-11-21 PROBLEM — Z23 ENCOUNTER FOR IMMUNIZATION: Status: ACTIVE | Noted: 2022-02-15

## 2022-11-21 NOTE — HISTORY OF PRESENT ILLNESS
[Patient] : patient [FreeTextEntry1] : n/a  [FreeTextEntry2] : ZAYNAB EARLY is a 89 year female with PMH of  CAD s/p CABG () followed by stents, afib (on Eliquis), and pulmonary HTN being seen for follow up. Patient is worried about getting her Eliquis supply because she has been having trouble getting it covered via insurance vs discount. Called [employer] and verified that 90 day supply for close to $300 dollar. Patient would like to get shower chair. Patient reporting intermittent GODFREY but states improved from previously and only occurs when she walks long distances. \par \par Subjective:\par -Appetite/weight: appetite is good. Weight stable. \par -Gait/falls: unsteady gait. fall twice in the past year. \par -Pain: sciatica pain. Uses tramadol in the morning. \par -Sleep: poorly. wakes up multiple times \par -BMs: chronic constipation. Takes Miralax but doesn't think it will work. \par -Urine: continent. no dysuria, \par -Skin: intact but bruising due to eliquis\par -DME: walker, commode, but need shower chair, no grab bars\par -Mood/memory: mood is ok. memory is pretty good. \par -Communication: full conversation. \par -Hospitalizations in the past year:\par \par Medical Issues:\par a) CAD s/p CABG and stents: chronic. Continue lasix and metoprolol. \par b) Afib: SR today. Continue Metoprolol and Eliquis\par c) Pulmonary HTN: chronic. GODFREY. Continue lasix and metolazone. \par d) Chronic constipation: miralax not working. Ordered Sennakot and Bisacodyl prn \par e) Sciatica: pain controlled with tramadol\par f) Hypokalemia: stable. reviewed lab. Continue potassium replacement\par \par \par Specialists:\par Cardiologist: Dr Alexei Trujillo \par \par \par Social hx: . Son has . Mormon and has Nondenominational support but doesn't utilize. \par Caregivers: daughter helps. \par MOLST: Full Code, Trial intubation and feeding tube, IV/antibiotics ok and hospitalize. Reviewed 2022\par HCP: Stephanie Smith (dtr) [phone]. Harjeet duncan (alt) \par \par

## 2022-11-21 NOTE — COUNSELING
[Improve mobility] : improve mobility [Improve pain control] : improve pain control [Maintain functional ability] : maintain functional ability [Completed Healthcare Proxy] : completed healthcare proxy [Completed Medical Orders for Life-Sustaining Treatment] : completed medical orders for life-sustaining treatment [Full Code] : Code Status: Full Code [Limited] : Treatment Guidelines: Limited [Trial of Intubation] : Intubation: Trial of Intubation [Last Verification Date: _____] : [facility] Completion/last verification date: [unfilled]

## 2022-11-21 NOTE — PHYSICAL EXAM
[No Acute Distress] : no acute distress [Well Nourished] : well nourished [Well Developed] : well developed [Normal Sclera/Conjunctiva] : normal sclera/conjunctiva [PERRL] : pupils equal, round and reactive to light [EOMI] : extra ocular movement intact [Normal Outer Ear/Nose] : the ears and nose were normal in appearance [Normal Oropharynx] : the oropharynx was normal [Supple] : the neck was supple [No Respiratory Distress] : no respiratory distress [Clear to Auscultation] : lungs were clear to auscultation bilaterally [No Accessory Muscle Use] : no accessory muscle use [Normal Rate] : heart rate was normal  [Regular Rhythm] : with a regular rhythm [Normal S1, S2] : normal S1 and S2 [Non Tender] : non-tender [Soft] : abdomen soft [Not Distended] : not distended [No Joint Swelling] : no joint swelling seen [No Skin Lesions] : no skin lesions [No Motor Deficits] : the motor exam was normal [No Gross Sensory Deficits] : no gross sensory deficits [Oriented x3] : oriented to person, place, and time [Normal Affect] : the affect was normal [Normal Mood] : the mood was normal [No JVD] : no jugular venous distention [No Murmurs] : no murmurs heard [Pedal Pulses Present] : the pedal pulses are present [Normal Bowel Sounds] : normal bowel sounds [No CVA Tenderness] : no ~M costovertebral angle tenderness [No Spinal Tenderness] : no spinal tenderness [No Rash] : no rash [Cranial Nerves Intact] : cranial nerves 2-12 were intact [de-identified] : pleasant [de-identified] : trace edema R>L [de-identified] : slow unsteady gait

## 2022-11-21 NOTE — HEALTH RISK ASSESSMENT
[Independent] : managing finances [Some assistance needed] : preparing meals [One fall no injury in past year] : Patient reported one fall in the past year without injury [Yes] : The patient does have visual impairment [Full assistance needed] : housekeeping [TimeGetUpGo] : 0 [de-identified] : Uses glasses

## 2023-01-01 ENCOUNTER — NON-APPOINTMENT (OUTPATIENT)
Age: 88
End: 2023-01-01

## 2023-01-01 ENCOUNTER — LABORATORY RESULT (OUTPATIENT)
Age: 88
End: 2023-01-01

## 2023-01-01 ENCOUNTER — APPOINTMENT (OUTPATIENT)
Dept: HOME HEALTH SERVICES | Facility: HOME HEALTH | Age: 88
End: 2023-01-01
Payer: MEDICARE

## 2023-01-01 ENCOUNTER — APPOINTMENT (OUTPATIENT)
Dept: HOME HEALTH SERVICES | Facility: HOME HEALTH | Age: 88
End: 2023-01-01

## 2023-01-01 ENCOUNTER — TRANSCRIPTION ENCOUNTER (OUTPATIENT)
Age: 88
End: 2023-01-01

## 2023-01-01 ENCOUNTER — INPATIENT (INPATIENT)
Facility: HOSPITAL | Age: 88
LOS: 4 days | Discharge: HOME HEALTH | End: 2023-05-27
Attending: GENERAL PRACTICE | Admitting: GENERAL PRACTICE
Payer: MEDICARE

## 2023-01-01 ENCOUNTER — INPATIENT (INPATIENT)
Facility: HOSPITAL | Age: 88
LOS: 0 days | DRG: 690 | End: 2023-07-05
Attending: GENERAL PRACTICE | Admitting: GENERAL PRACTICE
Payer: COMMERCIAL

## 2023-01-01 VITALS
RESPIRATION RATE: 18 BRPM | HEIGHT: 60 IN | HEART RATE: 64 BPM | WEIGHT: 149.91 LBS | DIASTOLIC BLOOD PRESSURE: 76 MMHG | TEMPERATURE: 98 F | OXYGEN SATURATION: 97 % | SYSTOLIC BLOOD PRESSURE: 135 MMHG

## 2023-01-01 VITALS — SYSTOLIC BLOOD PRESSURE: 122 MMHG | DIASTOLIC BLOOD PRESSURE: 78 MMHG | OXYGEN SATURATION: 98 % | HEART RATE: 90 BPM

## 2023-01-01 VITALS — OXYGEN SATURATION: 98 % | DIASTOLIC BLOOD PRESSURE: 74 MMHG | HEART RATE: 88 BPM | SYSTOLIC BLOOD PRESSURE: 134 MMHG

## 2023-01-01 VITALS
RESPIRATION RATE: 17 BRPM | HEART RATE: 66 BPM | OXYGEN SATURATION: 99 % | DIASTOLIC BLOOD PRESSURE: 82 MMHG | SYSTOLIC BLOOD PRESSURE: 127 MMHG | TEMPERATURE: 98 F

## 2023-01-01 VITALS
SYSTOLIC BLOOD PRESSURE: 156 MMHG | DIASTOLIC BLOOD PRESSURE: 70 MMHG | OXYGEN SATURATION: 99 % | RESPIRATION RATE: 17 BRPM | HEART RATE: 70 BPM

## 2023-01-01 VITALS
TEMPERATURE: 98 F | OXYGEN SATURATION: 96 % | HEART RATE: 50 BPM | SYSTOLIC BLOOD PRESSURE: 125 MMHG | RESPIRATION RATE: 18 BRPM | DIASTOLIC BLOOD PRESSURE: 53 MMHG

## 2023-01-01 VITALS
DIASTOLIC BLOOD PRESSURE: 80 MMHG | SYSTOLIC BLOOD PRESSURE: 142 MMHG | HEART RATE: 75 BPM | OXYGEN SATURATION: 100 % | RESPIRATION RATE: 20 BRPM | TEMPERATURE: 98 F

## 2023-01-01 DIAGNOSIS — Z95.1 PRESENCE OF AORTOCORONARY BYPASS GRAFT: Chronic | ICD-10-CM

## 2023-01-01 DIAGNOSIS — I48.20 CHRONIC ATRIAL FIBRILLATION, UNSPECIFIED: ICD-10-CM

## 2023-01-01 DIAGNOSIS — M54.31 SCIATICA, RIGHT SIDE: ICD-10-CM

## 2023-01-01 DIAGNOSIS — M54.32 SCIATICA, RIGHT SIDE: ICD-10-CM

## 2023-01-01 DIAGNOSIS — I48.91 UNSPECIFIED ATRIAL FIBRILLATION: ICD-10-CM

## 2023-01-01 DIAGNOSIS — N17.9 ACUTE KIDNEY FAILURE, UNSPECIFIED: ICD-10-CM

## 2023-01-01 DIAGNOSIS — E87.8 OTHER DISORDERS OF ELECTROLYTE AND FLUID BALANCE, NOT ELSEWHERE CLASSIFIED: ICD-10-CM

## 2023-01-01 DIAGNOSIS — M10.9 GOUT, UNSPECIFIED: ICD-10-CM

## 2023-01-01 DIAGNOSIS — R00.1 BRADYCARDIA, UNSPECIFIED: ICD-10-CM

## 2023-01-01 DIAGNOSIS — L03.119 CELLULITIS OF UNSPECIFIED PART OF LIMB: ICD-10-CM

## 2023-01-01 DIAGNOSIS — I25.10 ATHEROSCLEROTIC HEART DISEASE OF NATIVE CORONARY ARTERY WITHOUT ANGINA PECTORIS: ICD-10-CM

## 2023-01-01 DIAGNOSIS — R11.0 NAUSEA: ICD-10-CM

## 2023-01-01 DIAGNOSIS — N18.9 CHRONIC KIDNEY DISEASE, UNSPECIFIED: ICD-10-CM

## 2023-01-01 DIAGNOSIS — M54.10 RADICULOPATHY, SITE UNSPECIFIED: ICD-10-CM

## 2023-01-01 DIAGNOSIS — I27.20 PULMONARY HYPERTENSION, UNSPECIFIED: ICD-10-CM

## 2023-01-01 DIAGNOSIS — Z71.89 OTHER SPECIFIED COUNSELING: ICD-10-CM

## 2023-01-01 DIAGNOSIS — I50.9 HEART FAILURE, UNSPECIFIED: ICD-10-CM

## 2023-01-01 DIAGNOSIS — Z98.891 HISTORY OF UTERINE SCAR FROM PREVIOUS SURGERY: Chronic | ICD-10-CM

## 2023-01-01 DIAGNOSIS — M54.16 RADICULOPATHY, LUMBAR REGION: ICD-10-CM

## 2023-01-01 DIAGNOSIS — N18.30 CHRONIC KIDNEY DISEASE, STAGE 3 UNSPECIFIED: ICD-10-CM

## 2023-01-01 DIAGNOSIS — E79.0 HYPERURICEMIA W/OUT SIGNS OF INFLAMMATORY ARTHRITIS AND TOPHACEOUS DISEASE: ICD-10-CM

## 2023-01-01 DIAGNOSIS — E86.0 DEHYDRATION: ICD-10-CM

## 2023-01-01 DIAGNOSIS — R63.0 ANOREXIA: ICD-10-CM

## 2023-01-01 DIAGNOSIS — E87.6 HYPOKALEMIA: ICD-10-CM

## 2023-01-01 DIAGNOSIS — N39.0 URINARY TRACT INFECTION, SITE NOT SPECIFIED: ICD-10-CM

## 2023-01-01 LAB
A1C WITH ESTIMATED AVERAGE GLUCOSE RESULT: 5.6 % — SIGNIFICANT CHANGE UP (ref 4–5.6)
ALBUMIN SERPL ELPH-MCNC: 3.7 G/DL — SIGNIFICANT CHANGE UP (ref 3.3–5)
ALBUMIN SERPL ELPH-MCNC: 3.9 G/DL — SIGNIFICANT CHANGE UP (ref 3.3–5)
ALBUMIN SERPL ELPH-MCNC: 4 G/DL — SIGNIFICANT CHANGE UP (ref 3.3–5)
ALBUMIN SERPL ELPH-MCNC: 4 G/DL — SIGNIFICANT CHANGE UP (ref 3.3–5)
ALBUMIN SERPL ELPH-MCNC: 4.1 G/DL — SIGNIFICANT CHANGE UP (ref 3.3–5)
ALBUMIN SERPL ELPH-MCNC: 4.2 G/DL — SIGNIFICANT CHANGE UP (ref 3.3–5)
ALBUMIN SERPL ELPH-MCNC: 4.3 G/DL — SIGNIFICANT CHANGE UP (ref 3.3–5)
ALP SERPL-CCNC: 68 U/L — SIGNIFICANT CHANGE UP (ref 40–120)
ALP SERPL-CCNC: 75 U/L — SIGNIFICANT CHANGE UP (ref 40–120)
ALP SERPL-CCNC: 75 U/L — SIGNIFICANT CHANGE UP (ref 40–120)
ALP SERPL-CCNC: 80 U/L — SIGNIFICANT CHANGE UP (ref 40–120)
ALP SERPL-CCNC: 84 U/L — SIGNIFICANT CHANGE UP (ref 40–120)
ALP SERPL-CCNC: 88 U/L — SIGNIFICANT CHANGE UP (ref 40–120)
ALP SERPL-CCNC: 93 U/L — SIGNIFICANT CHANGE UP (ref 40–120)
ALT FLD-CCNC: 11 U/L — SIGNIFICANT CHANGE UP (ref 4–33)
ALT FLD-CCNC: 11 U/L — SIGNIFICANT CHANGE UP (ref 4–33)
ALT FLD-CCNC: 15 U/L — SIGNIFICANT CHANGE UP (ref 10–45)
ALT FLD-CCNC: 19 U/L — SIGNIFICANT CHANGE UP (ref 10–45)
ALT FLD-CCNC: 20 U/L — SIGNIFICANT CHANGE UP (ref 10–45)
ALT FLD-CCNC: 9 U/L — SIGNIFICANT CHANGE UP (ref 4–33)
ALT FLD-CCNC: 9 U/L — SIGNIFICANT CHANGE UP (ref 4–33)
ANION GAP SERPL CALC-SCNC: 11 MMOL/L — SIGNIFICANT CHANGE UP (ref 7–14)
ANION GAP SERPL CALC-SCNC: 13 MMOL/L — SIGNIFICANT CHANGE UP (ref 7–14)
ANION GAP SERPL CALC-SCNC: 14 MMOL/L — SIGNIFICANT CHANGE UP (ref 5–17)
ANION GAP SERPL CALC-SCNC: 14 MMOL/L — SIGNIFICANT CHANGE UP (ref 7–14)
ANION GAP SERPL CALC-SCNC: 15 MMOL/L — HIGH (ref 7–14)
ANION GAP SERPL CALC-SCNC: 15 MMOL/L — SIGNIFICANT CHANGE UP (ref 5–17)
ANION GAP SERPL CALC-SCNC: 16 MMOL/L — SIGNIFICANT CHANGE UP (ref 5–17)
ANION GAP SERPL CALC-SCNC: 16 MMOL/L — SIGNIFICANT CHANGE UP (ref 5–17)
ANION GAP SERPL CALC-SCNC: 17 MMOL/L — SIGNIFICANT CHANGE UP (ref 5–17)
APPEARANCE UR: ABNORMAL
APTT BLD: 31.1 SEC — SIGNIFICANT CHANGE UP (ref 27.5–35.5)
AST SERPL-CCNC: 23 U/L — SIGNIFICANT CHANGE UP (ref 4–32)
AST SERPL-CCNC: 26 U/L — SIGNIFICANT CHANGE UP (ref 4–32)
AST SERPL-CCNC: 29 U/L — SIGNIFICANT CHANGE UP (ref 4–32)
AST SERPL-CCNC: 30 U/L — SIGNIFICANT CHANGE UP (ref 4–32)
AST SERPL-CCNC: 38 U/L — SIGNIFICANT CHANGE UP (ref 10–40)
AST SERPL-CCNC: 56 U/L — HIGH (ref 10–40)
AST SERPL-CCNC: 56 U/L — HIGH (ref 10–40)
BACTERIA # UR AUTO: ABNORMAL
BASE EXCESS BLDV CALC-SCNC: 16 MMOL/L — HIGH (ref -2–3)
BASOPHILS # BLD AUTO: 0.01 K/UL — SIGNIFICANT CHANGE UP (ref 0–0.2)
BASOPHILS NFR BLD AUTO: 0.1 % — SIGNIFICANT CHANGE UP (ref 0–2)
BASOPHILS NFR BLD AUTO: 0.2 % — SIGNIFICANT CHANGE UP (ref 0–2)
BILIRUB SERPL-MCNC: 0.4 MG/DL — SIGNIFICANT CHANGE UP (ref 0.2–1.2)
BILIRUB SERPL-MCNC: 0.4 MG/DL — SIGNIFICANT CHANGE UP (ref 0.2–1.2)
BILIRUB SERPL-MCNC: 0.5 MG/DL — SIGNIFICANT CHANGE UP (ref 0.2–1.2)
BILIRUB SERPL-MCNC: 0.5 MG/DL — SIGNIFICANT CHANGE UP (ref 0.2–1.2)
BILIRUB SERPL-MCNC: 1 MG/DL — SIGNIFICANT CHANGE UP (ref 0.2–1.2)
BILIRUB SERPL-MCNC: 1.4 MG/DL — HIGH (ref 0.2–1.2)
BILIRUB SERPL-MCNC: 1.5 MG/DL — HIGH (ref 0.2–1.2)
BILIRUB UR-MCNC: NEGATIVE — SIGNIFICANT CHANGE UP
BUN SERPL-MCNC: 51 MG/DL — HIGH (ref 7–23)
BUN SERPL-MCNC: 52 MG/DL — HIGH (ref 7–23)
BUN SERPL-MCNC: 55 MG/DL — HIGH (ref 7–23)
BUN SERPL-MCNC: 58 MG/DL — HIGH (ref 7–23)
BUN SERPL-MCNC: 58 MG/DL — HIGH (ref 7–23)
BUN SERPL-MCNC: 62 MG/DL — HIGH (ref 7–23)
BUN SERPL-MCNC: 63 MG/DL — HIGH (ref 7–23)
BUN SERPL-MCNC: 64 MG/DL — HIGH (ref 7–23)
BUN SERPL-MCNC: 67 MG/DL — HIGH (ref 7–23)
CA-I SERPL-SCNC: 1.16 MMOL/L — SIGNIFICANT CHANGE UP (ref 1.15–1.33)
CALCIUM SERPL-MCNC: 10 MG/DL — SIGNIFICANT CHANGE UP (ref 8.4–10.5)
CALCIUM SERPL-MCNC: 10.3 MG/DL — SIGNIFICANT CHANGE UP (ref 8.4–10.5)
CALCIUM SERPL-MCNC: 10.4 MG/DL — SIGNIFICANT CHANGE UP (ref 8.4–10.5)
CALCIUM SERPL-MCNC: 10.5 MG/DL — SIGNIFICANT CHANGE UP (ref 8.4–10.5)
CALCIUM SERPL-MCNC: 10.6 MG/DL — HIGH (ref 8.4–10.5)
CALCIUM SERPL-MCNC: 10.6 MG/DL — HIGH (ref 8.4–10.5)
CALCIUM SERPL-MCNC: 9.5 MG/DL — SIGNIFICANT CHANGE UP (ref 8.4–10.5)
CALCIUM SERPL-MCNC: 9.7 MG/DL — SIGNIFICANT CHANGE UP (ref 8.4–10.5)
CALCIUM SERPL-MCNC: 9.8 MG/DL — SIGNIFICANT CHANGE UP (ref 8.4–10.5)
CALCIUM SERPL-MCNC: 9.8 MG/DL — SIGNIFICANT CHANGE UP (ref 8.4–10.5)
CALCIUM SERPL-MCNC: 9.9 MG/DL — SIGNIFICANT CHANGE UP (ref 8.4–10.5)
CHLORIDE BLDV-SCNC: 89 MMOL/L — LOW (ref 96–108)
CHLORIDE SERPL-SCNC: 84 MMOL/L — LOW (ref 96–108)
CHLORIDE SERPL-SCNC: 85 MMOL/L — LOW (ref 96–108)
CHLORIDE SERPL-SCNC: 85 MMOL/L — LOW (ref 96–108)
CHLORIDE SERPL-SCNC: 86 MMOL/L — LOW (ref 96–108)
CHLORIDE SERPL-SCNC: 91 MMOL/L — LOW (ref 98–107)
CHLORIDE SERPL-SCNC: 92 MMOL/L — LOW (ref 98–107)
CHLORIDE SERPL-SCNC: 93 MMOL/L — LOW (ref 96–108)
CHLORIDE SERPL-SCNC: 93 MMOL/L — LOW (ref 98–107)
CHLORIDE SERPL-SCNC: 94 MMOL/L — LOW (ref 98–107)
CHLORIDE SERPL-SCNC: 95 MMOL/L — LOW (ref 98–107)
CHLORIDE SERPL-SCNC: 97 MMOL/L — LOW (ref 98–107)
CHOLEST SERPL-MCNC: 163 MG/DL — SIGNIFICANT CHANGE UP
CO2 BLDV-SCNC: 41 MMOL/L — HIGH (ref 22–26)
CO2 SERPL-SCNC: 27 MMOL/L — SIGNIFICANT CHANGE UP (ref 22–31)
CO2 SERPL-SCNC: 28 MMOL/L — SIGNIFICANT CHANGE UP (ref 22–31)
CO2 SERPL-SCNC: 29 MMOL/L — SIGNIFICANT CHANGE UP (ref 22–31)
CO2 SERPL-SCNC: 29 MMOL/L — SIGNIFICANT CHANGE UP (ref 22–31)
CO2 SERPL-SCNC: 30 MMOL/L — SIGNIFICANT CHANGE UP (ref 22–31)
CO2 SERPL-SCNC: 30 MMOL/L — SIGNIFICANT CHANGE UP (ref 22–31)
CO2 SERPL-SCNC: 31 MMOL/L — SIGNIFICANT CHANGE UP (ref 22–31)
CO2 SERPL-SCNC: 31 MMOL/L — SIGNIFICANT CHANGE UP (ref 22–31)
CO2 SERPL-SCNC: 33 MMOL/L — HIGH (ref 22–31)
CO2 SERPL-SCNC: 33 MMOL/L — HIGH (ref 22–31)
CO2 SERPL-SCNC: 35 MMOL/L — HIGH (ref 22–31)
COLOR SPEC: YELLOW — SIGNIFICANT CHANGE UP
CREAT SERPL-MCNC: 1.03 MG/DL — SIGNIFICANT CHANGE UP (ref 0.5–1.3)
CREAT SERPL-MCNC: 1.04 MG/DL — SIGNIFICANT CHANGE UP (ref 0.5–1.3)
CREAT SERPL-MCNC: 1.07 MG/DL — SIGNIFICANT CHANGE UP (ref 0.5–1.3)
CREAT SERPL-MCNC: 1.12 MG/DL — SIGNIFICANT CHANGE UP (ref 0.5–1.3)
CREAT SERPL-MCNC: 1.16 MG/DL — SIGNIFICANT CHANGE UP (ref 0.5–1.3)
CREAT SERPL-MCNC: 1.17 MG/DL — SIGNIFICANT CHANGE UP (ref 0.5–1.3)
CREAT SERPL-MCNC: 1.26 MG/DL — SIGNIFICANT CHANGE UP (ref 0.5–1.3)
CREAT SERPL-MCNC: 1.27 MG/DL — SIGNIFICANT CHANGE UP (ref 0.5–1.3)
CREAT SERPL-MCNC: 1.33 MG/DL — HIGH (ref 0.5–1.3)
CREAT SERPL-MCNC: 1.35 MG/DL — HIGH (ref 0.5–1.3)
CREAT SERPL-MCNC: 1.69 MG/DL — HIGH (ref 0.5–1.3)
DIFF PNL FLD: ABNORMAL
EGFR: 28 ML/MIN/1.73M2 — LOW
EGFR: 37 ML/MIN/1.73M2 — LOW
EGFR: 37 ML/MIN/1.73M2 — LOW
EGFR: 39 ML/MIN/1.73M2 — LOW
EGFR: 40 ML/MIN/1.73M2 — LOW
EGFR: 44 ML/MIN/1.73M2 — LOW
EGFR: 44 ML/MIN/1.73M2 — LOW
EGFR: 46 ML/MIN/1.73M2 — LOW
EGFR: 48 ML/MIN/1.73M2 — LOW
EGFR: 50 ML/MIN/1.73M2 — LOW
EGFR: 51 ML/MIN/1.73M2 — LOW
EOSINOPHIL # BLD AUTO: 0.01 K/UL — SIGNIFICANT CHANGE UP (ref 0–0.5)
EOSINOPHIL # BLD AUTO: 0.06 K/UL — SIGNIFICANT CHANGE UP (ref 0–0.5)
EOSINOPHIL # BLD AUTO: 0.08 K/UL — SIGNIFICANT CHANGE UP (ref 0–0.5)
EOSINOPHIL # BLD AUTO: 0.09 K/UL — SIGNIFICANT CHANGE UP (ref 0–0.5)
EOSINOPHIL NFR BLD AUTO: 0.1 % — SIGNIFICANT CHANGE UP (ref 0–6)
EOSINOPHIL NFR BLD AUTO: 1.4 % — SIGNIFICANT CHANGE UP (ref 0–6)
EOSINOPHIL NFR BLD AUTO: 1.9 % — SIGNIFICANT CHANGE UP (ref 0–6)
EOSINOPHIL NFR BLD AUTO: 2 % — SIGNIFICANT CHANGE UP (ref 0–6)
EPI CELLS # UR: 20 /HPF — HIGH
ESTIMATED AVERAGE GLUCOSE: 114 — SIGNIFICANT CHANGE UP
FOLATE SERPL-MCNC: 12 NG/ML — SIGNIFICANT CHANGE UP (ref 3.1–17.5)
GAS PNL BLDV: 125 MMOL/L — LOW (ref 136–145)
GAS PNL BLDV: SIGNIFICANT CHANGE UP
GAS PNL BLDV: SIGNIFICANT CHANGE UP
GLUCOSE BLDV-MCNC: 90 MG/DL — SIGNIFICANT CHANGE UP (ref 70–99)
GLUCOSE SERPL-MCNC: 102 MG/DL — HIGH (ref 70–99)
GLUCOSE SERPL-MCNC: 104 MG/DL — HIGH (ref 70–99)
GLUCOSE SERPL-MCNC: 111 MG/DL — HIGH (ref 70–99)
GLUCOSE SERPL-MCNC: 111 MG/DL — HIGH (ref 70–99)
GLUCOSE SERPL-MCNC: 116 MG/DL — HIGH (ref 70–99)
GLUCOSE SERPL-MCNC: 135 MG/DL — HIGH (ref 70–99)
GLUCOSE SERPL-MCNC: 90 MG/DL — SIGNIFICANT CHANGE UP (ref 70–99)
GLUCOSE SERPL-MCNC: 92 MG/DL — SIGNIFICANT CHANGE UP (ref 70–99)
GLUCOSE SERPL-MCNC: 93 MG/DL — SIGNIFICANT CHANGE UP (ref 70–99)
GLUCOSE SERPL-MCNC: 95 MG/DL — SIGNIFICANT CHANGE UP (ref 70–99)
GLUCOSE SERPL-MCNC: 95 MG/DL — SIGNIFICANT CHANGE UP (ref 70–99)
GLUCOSE UR QL: NEGATIVE — SIGNIFICANT CHANGE UP
HCO3 BLDV-SCNC: 39 MMOL/L — HIGH (ref 22–29)
HCT VFR BLD CALC: 33 % — LOW (ref 34.5–45)
HCT VFR BLD CALC: 34.5 % — SIGNIFICANT CHANGE UP (ref 34.5–45)
HCT VFR BLD CALC: 36.1 % — SIGNIFICANT CHANGE UP (ref 34.5–45)
HCT VFR BLD CALC: 36.7 % — SIGNIFICANT CHANGE UP (ref 34.5–45)
HCT VFR BLD CALC: 37.1 % — SIGNIFICANT CHANGE UP (ref 34.5–45)
HCT VFR BLD CALC: 37.9 % — SIGNIFICANT CHANGE UP (ref 34.5–45)
HCT VFR BLD CALC: 39.9 % — SIGNIFICANT CHANGE UP (ref 34.5–45)
HCT VFR BLD CALC: 41 % — SIGNIFICANT CHANGE UP (ref 34.5–45)
HCT VFR BLDA CALC: 38 % — SIGNIFICANT CHANGE UP (ref 34.5–46.5)
HDLC SERPL-MCNC: 45 MG/DL — LOW
HGB BLD CALC-MCNC: 12.6 G/DL — SIGNIFICANT CHANGE UP (ref 11.7–16.1)
HGB BLD-MCNC: 10.3 G/DL — LOW (ref 11.5–15.5)
HGB BLD-MCNC: 10.8 G/DL — LOW (ref 11.5–15.5)
HGB BLD-MCNC: 11 G/DL — LOW (ref 11.5–15.5)
HGB BLD-MCNC: 11.3 G/DL — LOW (ref 11.5–15.5)
HGB BLD-MCNC: 11.5 G/DL — SIGNIFICANT CHANGE UP (ref 11.5–15.5)
HGB BLD-MCNC: 11.6 G/DL — SIGNIFICANT CHANGE UP (ref 11.5–15.5)
HGB BLD-MCNC: 12.4 G/DL — SIGNIFICANT CHANGE UP (ref 11.5–15.5)
HGB BLD-MCNC: 12.8 G/DL — SIGNIFICANT CHANGE UP (ref 11.5–15.5)
HYALINE CASTS # UR AUTO: 5 /LPF — HIGH (ref 0–2)
IANC: 2.49 K/UL — SIGNIFICANT CHANGE UP (ref 1.8–7.4)
IANC: 2.99 K/UL — SIGNIFICANT CHANGE UP (ref 1.8–7.4)
IANC: 3.31 K/UL — SIGNIFICANT CHANGE UP (ref 1.8–7.4)
IMM GRANULOCYTES NFR BLD AUTO: 0.4 % — SIGNIFICANT CHANGE UP (ref 0–0.9)
IMM GRANULOCYTES NFR BLD AUTO: 0.5 % — SIGNIFICANT CHANGE UP (ref 0–0.9)
IMM GRANULOCYTES NFR BLD AUTO: 0.5 % — SIGNIFICANT CHANGE UP (ref 0–0.9)
IMM GRANULOCYTES NFR BLD AUTO: 0.8 % — SIGNIFICANT CHANGE UP (ref 0–0.9)
INR BLD: 1.51 RATIO — HIGH (ref 0.88–1.16)
KETONES UR-MCNC: NEGATIVE — SIGNIFICANT CHANGE UP
LACTATE BLDV-MCNC: 1.9 MMOL/L — SIGNIFICANT CHANGE UP (ref 0.5–2)
LEUKOCYTE ESTERASE UR-ACNC: ABNORMAL
LIDOCAIN IGE QN: 29 U/L — SIGNIFICANT CHANGE UP (ref 7–60)
LIPID PNL WITH DIRECT LDL SERPL: 94 MG/DL — SIGNIFICANT CHANGE UP
LYMPHOCYTES # BLD AUTO: 0.77 K/UL — LOW (ref 1–3.3)
LYMPHOCYTES # BLD AUTO: 0.83 K/UL — LOW (ref 1–3.3)
LYMPHOCYTES # BLD AUTO: 0.94 K/UL — LOW (ref 1–3.3)
LYMPHOCYTES # BLD AUTO: 0.98 K/UL — LOW (ref 1–3.3)
LYMPHOCYTES # BLD AUTO: 12.4 % — LOW (ref 13–44)
LYMPHOCYTES # BLD AUTO: 17.9 % — SIGNIFICANT CHANGE UP (ref 13–44)
LYMPHOCYTES # BLD AUTO: 18.3 % — SIGNIFICANT CHANGE UP (ref 13–44)
LYMPHOCYTES # BLD AUTO: 24.4 % — SIGNIFICANT CHANGE UP (ref 13–44)
MAGNESIUM SERPL-MCNC: 2.1 MG/DL — SIGNIFICANT CHANGE UP (ref 1.6–2.6)
MAGNESIUM SERPL-MCNC: 2.3 MG/DL — SIGNIFICANT CHANGE UP (ref 1.6–2.6)
MAGNESIUM SERPL-MCNC: 2.5 MG/DL — SIGNIFICANT CHANGE UP (ref 1.6–2.6)
MAGNESIUM SERPL-MCNC: 2.7 MG/DL — HIGH (ref 1.6–2.6)
MCHC RBC-ENTMCNC: 27.2 PG — SIGNIFICANT CHANGE UP (ref 27–34)
MCHC RBC-ENTMCNC: 27.4 PG — SIGNIFICANT CHANGE UP (ref 27–34)
MCHC RBC-ENTMCNC: 27.4 PG — SIGNIFICANT CHANGE UP (ref 27–34)
MCHC RBC-ENTMCNC: 27.7 PG — SIGNIFICANT CHANGE UP (ref 27–34)
MCHC RBC-ENTMCNC: 27.8 PG — SIGNIFICANT CHANGE UP (ref 27–34)
MCHC RBC-ENTMCNC: 28.1 PG — SIGNIFICANT CHANGE UP (ref 27–34)
MCHC RBC-ENTMCNC: 28.3 PG — SIGNIFICANT CHANGE UP (ref 27–34)
MCHC RBC-ENTMCNC: 28.6 PG — SIGNIFICANT CHANGE UP (ref 27–34)
MCHC RBC-ENTMCNC: 30.5 GM/DL — LOW (ref 32–36)
MCHC RBC-ENTMCNC: 30.6 GM/DL — LOW (ref 32–36)
MCHC RBC-ENTMCNC: 30.8 GM/DL — LOW (ref 32–36)
MCHC RBC-ENTMCNC: 31 GM/DL — LOW (ref 32–36)
MCHC RBC-ENTMCNC: 31.1 GM/DL — LOW (ref 32–36)
MCHC RBC-ENTMCNC: 31.2 GM/DL — LOW (ref 32–36)
MCHC RBC-ENTMCNC: 31.2 GM/DL — LOW (ref 32–36)
MCHC RBC-ENTMCNC: 31.3 GM/DL — LOW (ref 32–36)
MCV RBC AUTO: 87.3 FL — SIGNIFICANT CHANGE UP (ref 80–100)
MCV RBC AUTO: 88.3 FL — SIGNIFICANT CHANGE UP (ref 80–100)
MCV RBC AUTO: 88.7 FL — SIGNIFICANT CHANGE UP (ref 80–100)
MCV RBC AUTO: 89.6 FL — SIGNIFICANT CHANGE UP (ref 80–100)
MCV RBC AUTO: 89.9 FL — SIGNIFICANT CHANGE UP (ref 80–100)
MCV RBC AUTO: 90.9 FL — SIGNIFICANT CHANGE UP (ref 80–100)
MCV RBC AUTO: 91.9 FL — SIGNIFICANT CHANGE UP (ref 80–100)
MCV RBC AUTO: 92 FL — SIGNIFICANT CHANGE UP (ref 80–100)
MONOCYTES # BLD AUTO: 0.36 K/UL — SIGNIFICANT CHANGE UP (ref 0–0.9)
MONOCYTES # BLD AUTO: 0.37 K/UL — SIGNIFICANT CHANGE UP (ref 0–0.9)
MONOCYTES # BLD AUTO: 0.43 K/UL — SIGNIFICANT CHANGE UP (ref 0–0.9)
MONOCYTES # BLD AUTO: 0.86 K/UL — SIGNIFICANT CHANGE UP (ref 0–0.9)
MONOCYTES NFR BLD AUTO: 10.7 % — SIGNIFICANT CHANGE UP (ref 2–14)
MONOCYTES NFR BLD AUTO: 11.3 % — SIGNIFICANT CHANGE UP (ref 2–14)
MONOCYTES NFR BLD AUTO: 8 % — SIGNIFICANT CHANGE UP (ref 2–14)
MONOCYTES NFR BLD AUTO: 8.6 % — SIGNIFICANT CHANGE UP (ref 2–14)
MRSA PCR RESULT.: SIGNIFICANT CHANGE UP
NEUTROPHILS # BLD AUTO: 2.49 K/UL — SIGNIFICANT CHANGE UP (ref 1.8–7.4)
NEUTROPHILS # BLD AUTO: 2.99 K/UL — SIGNIFICANT CHANGE UP (ref 1.8–7.4)
NEUTROPHILS # BLD AUTO: 3.31 K/UL — SIGNIFICANT CHANGE UP (ref 1.8–7.4)
NEUTROPHILS # BLD AUTO: 5.72 K/UL — SIGNIFICANT CHANGE UP (ref 1.8–7.4)
NEUTROPHILS NFR BLD AUTO: 62.2 % — SIGNIFICANT CHANGE UP (ref 43–77)
NEUTROPHILS NFR BLD AUTO: 71 % — SIGNIFICANT CHANGE UP (ref 43–77)
NEUTROPHILS NFR BLD AUTO: 71.6 % — SIGNIFICANT CHANGE UP (ref 43–77)
NEUTROPHILS NFR BLD AUTO: 75.3 % — SIGNIFICANT CHANGE UP (ref 43–77)
NITRITE UR-MCNC: NEGATIVE — SIGNIFICANT CHANGE UP
NON HDL CHOLESTEROL: 118 MG/DL — SIGNIFICANT CHANGE UP
NRBC # BLD: 0 /100 WBCS — SIGNIFICANT CHANGE UP (ref 0–0)
NRBC # FLD: 0 K/UL — SIGNIFICANT CHANGE UP (ref 0–0)
NT-PROBNP SERPL-SCNC: 7012 PG/ML — HIGH (ref 0–300)
PCO2 BLDV: 41 MMHG — SIGNIFICANT CHANGE UP (ref 39–42)
PH BLDV: 7.59 — HIGH (ref 7.32–7.43)
PH UR: 6.5 — SIGNIFICANT CHANGE UP (ref 5–8)
PHOSPHATE SERPL-MCNC: 2.9 MG/DL — SIGNIFICANT CHANGE UP (ref 2.5–4.5)
PHOSPHATE SERPL-MCNC: 3 MG/DL — SIGNIFICANT CHANGE UP (ref 2.5–4.5)
PHOSPHATE SERPL-MCNC: 3.1 MG/DL — SIGNIFICANT CHANGE UP (ref 2.5–4.5)
PLATELET # BLD AUTO: 218 K/UL — SIGNIFICANT CHANGE UP (ref 150–400)
PLATELET # BLD AUTO: 227 K/UL — SIGNIFICANT CHANGE UP (ref 150–400)
PLATELET # BLD AUTO: 238 K/UL — SIGNIFICANT CHANGE UP (ref 150–400)
PLATELET # BLD AUTO: 246 K/UL — SIGNIFICANT CHANGE UP (ref 150–400)
PLATELET # BLD AUTO: 263 K/UL — SIGNIFICANT CHANGE UP (ref 150–400)
PLATELET # BLD AUTO: 281 K/UL — SIGNIFICANT CHANGE UP (ref 150–400)
PLATELET # BLD AUTO: 287 K/UL — SIGNIFICANT CHANGE UP (ref 150–400)
PLATELET # BLD AUTO: 303 K/UL — SIGNIFICANT CHANGE UP (ref 150–400)
PO2 BLDV: 52 MMHG — HIGH (ref 25–45)
POTASSIUM BLDV-SCNC: 2.8 MMOL/L — CRITICAL LOW (ref 3.5–5.1)
POTASSIUM SERPL-MCNC: 2.6 MMOL/L — CRITICAL LOW (ref 3.5–5.3)
POTASSIUM SERPL-MCNC: 2.9 MMOL/L — CRITICAL LOW (ref 3.5–5.3)
POTASSIUM SERPL-MCNC: 3 MMOL/L — LOW (ref 3.5–5.3)
POTASSIUM SERPL-MCNC: 3.1 MMOL/L — LOW (ref 3.5–5.3)
POTASSIUM SERPL-MCNC: 3.2 MMOL/L — LOW (ref 3.5–5.3)
POTASSIUM SERPL-MCNC: 3.4 MMOL/L — LOW (ref 3.5–5.3)
POTASSIUM SERPL-MCNC: 3.5 MMOL/L — SIGNIFICANT CHANGE UP (ref 3.5–5.3)
POTASSIUM SERPL-MCNC: 3.6 MMOL/L — SIGNIFICANT CHANGE UP (ref 3.5–5.3)
POTASSIUM SERPL-MCNC: 3.7 MMOL/L — SIGNIFICANT CHANGE UP (ref 3.5–5.3)
POTASSIUM SERPL-SCNC: 2.6 MMOL/L — CRITICAL LOW (ref 3.5–5.3)
POTASSIUM SERPL-SCNC: 2.9 MMOL/L — CRITICAL LOW (ref 3.5–5.3)
POTASSIUM SERPL-SCNC: 3 MMOL/L — LOW (ref 3.5–5.3)
POTASSIUM SERPL-SCNC: 3.1 MMOL/L — LOW (ref 3.5–5.3)
POTASSIUM SERPL-SCNC: 3.2 MMOL/L — LOW (ref 3.5–5.3)
POTASSIUM SERPL-SCNC: 3.4 MMOL/L — LOW (ref 3.5–5.3)
POTASSIUM SERPL-SCNC: 3.5 MMOL/L — SIGNIFICANT CHANGE UP (ref 3.5–5.3)
POTASSIUM SERPL-SCNC: 3.6 MMOL/L — SIGNIFICANT CHANGE UP (ref 3.5–5.3)
POTASSIUM SERPL-SCNC: 3.7 MMOL/L — SIGNIFICANT CHANGE UP (ref 3.5–5.3)
PROT SERPL-MCNC: 6.9 G/DL — SIGNIFICANT CHANGE UP (ref 6–8.3)
PROT SERPL-MCNC: 7.1 G/DL — SIGNIFICANT CHANGE UP (ref 6–8.3)
PROT SERPL-MCNC: 7.3 G/DL — SIGNIFICANT CHANGE UP (ref 6–8.3)
PROT SERPL-MCNC: 7.5 G/DL — SIGNIFICANT CHANGE UP (ref 6–8.3)
PROT SERPL-MCNC: 7.6 G/DL — SIGNIFICANT CHANGE UP (ref 6–8.3)
PROT SERPL-MCNC: 7.7 G/DL — SIGNIFICANT CHANGE UP (ref 6–8.3)
PROT SERPL-MCNC: 8.1 G/DL — SIGNIFICANT CHANGE UP (ref 6–8.3)
PROT UR-MCNC: ABNORMAL
PROTHROM AB SERPL-ACNC: 17.6 SEC — HIGH (ref 10.5–13.4)
RBC # BLD: 3.78 M/UL — LOW (ref 3.8–5.2)
RBC # BLD: 3.89 M/UL — SIGNIFICANT CHANGE UP (ref 3.8–5.2)
RBC # BLD: 3.97 M/UL — SIGNIFICANT CHANGE UP (ref 3.8–5.2)
RBC # BLD: 3.99 M/UL — SIGNIFICANT CHANGE UP (ref 3.8–5.2)
RBC # BLD: 4.2 M/UL — SIGNIFICANT CHANGE UP (ref 3.8–5.2)
RBC # BLD: 4.23 M/UL — SIGNIFICANT CHANGE UP (ref 3.8–5.2)
RBC # BLD: 4.34 M/UL — SIGNIFICANT CHANGE UP (ref 3.8–5.2)
RBC # BLD: 4.56 M/UL — SIGNIFICANT CHANGE UP (ref 3.8–5.2)
RBC # FLD: 16.8 % — HIGH (ref 10.3–14.5)
RBC # FLD: 17.1 % — HIGH (ref 10.3–14.5)
RBC # FLD: 17.2 % — HIGH (ref 10.3–14.5)
RBC # FLD: 17.3 % — HIGH (ref 10.3–14.5)
RBC # FLD: 17.3 % — HIGH (ref 10.3–14.5)
RBC # FLD: 17.5 % — HIGH (ref 10.3–14.5)
RBC # FLD: 17.5 % — HIGH (ref 10.3–14.5)
RBC # FLD: 17.6 % — HIGH (ref 10.3–14.5)
RBC CASTS # UR COMP ASSIST: 2 /HPF — SIGNIFICANT CHANGE UP (ref 0–4)
S AUREUS DNA NOSE QL NAA+PROBE: SIGNIFICANT CHANGE UP
SAO2 % BLDV: 88.3 % — HIGH (ref 67–88)
SODIUM SERPL-SCNC: 131 MMOL/L — LOW (ref 135–145)
SODIUM SERPL-SCNC: 132 MMOL/L — LOW (ref 135–145)
SODIUM SERPL-SCNC: 135 MMOL/L — SIGNIFICANT CHANGE UP (ref 135–145)
SODIUM SERPL-SCNC: 136 MMOL/L — SIGNIFICANT CHANGE UP (ref 135–145)
SODIUM SERPL-SCNC: 137 MMOL/L — SIGNIFICANT CHANGE UP (ref 135–145)
SODIUM SERPL-SCNC: 138 MMOL/L — SIGNIFICANT CHANGE UP (ref 135–145)
SP GR SPEC: 1.01 — SIGNIFICANT CHANGE UP (ref 1.01–1.02)
T3 SERPL-MCNC: 59 NG/DL — LOW (ref 80–200)
T4 AB SER-ACNC: 6.8 UG/DL — SIGNIFICANT CHANGE UP (ref 4.6–12)
T4 FREE SERPL-MCNC: 1.3 NG/DL — SIGNIFICANT CHANGE UP (ref 0.9–1.8)
T4 FREE SERPL-MCNC: 1.4 NG/DL — SIGNIFICANT CHANGE UP (ref 0.9–1.8)
TRIGL SERPL-MCNC: 121 MG/DL — SIGNIFICANT CHANGE UP
TROPONIN T, HIGH SENSITIVITY RESULT: 125 NG/L — HIGH (ref 0–51)
TROPONIN T, HIGH SENSITIVITY RESULT: 133 NG/L — HIGH (ref 0–51)
TSH SERPL-MCNC: 3.86 UIU/ML — SIGNIFICANT CHANGE UP (ref 0.27–4.2)
TSH SERPL-MCNC: 4.26 UIU/ML — HIGH (ref 0.27–4.2)
UROBILINOGEN FLD QL: NEGATIVE — SIGNIFICANT CHANGE UP
VIT B12 SERPL-MCNC: 1020 PG/ML — HIGH (ref 200–900)
WBC # BLD: 4.01 K/UL — SIGNIFICANT CHANGE UP (ref 3.8–10.5)
WBC # BLD: 4.02 K/UL — SIGNIFICANT CHANGE UP (ref 3.8–10.5)
WBC # BLD: 4.21 K/UL — SIGNIFICANT CHANGE UP (ref 3.8–10.5)
WBC # BLD: 4.3 K/UL — SIGNIFICANT CHANGE UP (ref 3.8–10.5)
WBC # BLD: 4.32 K/UL — SIGNIFICANT CHANGE UP (ref 3.8–10.5)
WBC # BLD: 4.63 K/UL — SIGNIFICANT CHANGE UP (ref 3.8–10.5)
WBC # BLD: 7.56 K/UL — SIGNIFICANT CHANGE UP (ref 3.8–10.5)
WBC # BLD: 7.6 K/UL — SIGNIFICANT CHANGE UP (ref 3.8–10.5)
WBC # FLD AUTO: 4.01 K/UL — SIGNIFICANT CHANGE UP (ref 3.8–10.5)
WBC # FLD AUTO: 4.02 K/UL — SIGNIFICANT CHANGE UP (ref 3.8–10.5)
WBC # FLD AUTO: 4.21 K/UL — SIGNIFICANT CHANGE UP (ref 3.8–10.5)
WBC # FLD AUTO: 4.3 K/UL — SIGNIFICANT CHANGE UP (ref 3.8–10.5)
WBC # FLD AUTO: 4.32 K/UL — SIGNIFICANT CHANGE UP (ref 3.8–10.5)
WBC # FLD AUTO: 4.63 K/UL — SIGNIFICANT CHANGE UP (ref 3.8–10.5)
WBC # FLD AUTO: 7.56 K/UL — SIGNIFICANT CHANGE UP (ref 3.8–10.5)
WBC # FLD AUTO: 7.6 K/UL — SIGNIFICANT CHANGE UP (ref 3.8–10.5)
WBC UR QL: 30 /HPF — HIGH (ref 0–5)

## 2023-01-01 PROCEDURE — 74177 CT ABD & PELVIS W/CONTRAST: CPT | Mod: 26,MA

## 2023-01-01 PROCEDURE — 96374 THER/PROPH/DIAG INJ IV PUSH: CPT

## 2023-01-01 PROCEDURE — 87641 MR-STAPH DNA AMP PROBE: CPT

## 2023-01-01 PROCEDURE — 85610 PROTHROMBIN TIME: CPT

## 2023-01-01 PROCEDURE — 99291 CRITICAL CARE FIRST HOUR: CPT

## 2023-01-01 PROCEDURE — 84484 ASSAY OF TROPONIN QUANT: CPT

## 2023-01-01 PROCEDURE — 99232 SBSQ HOSP IP/OBS MODERATE 35: CPT

## 2023-01-01 PROCEDURE — 93971 EXTREMITY STUDY: CPT | Mod: 26,RT

## 2023-01-01 PROCEDURE — 99285 EMERGENCY DEPT VISIT HI MDM: CPT

## 2023-01-01 PROCEDURE — 72148 MRI LUMBAR SPINE W/O DYE: CPT | Mod: 26

## 2023-01-01 PROCEDURE — 83880 ASSAY OF NATRIURETIC PEPTIDE: CPT

## 2023-01-01 PROCEDURE — 82330 ASSAY OF CALCIUM: CPT

## 2023-01-01 PROCEDURE — 93005 ELECTROCARDIOGRAM TRACING: CPT

## 2023-01-01 PROCEDURE — 80048 BASIC METABOLIC PNL TOTAL CA: CPT

## 2023-01-01 PROCEDURE — 85027 COMPLETE CBC AUTOMATED: CPT

## 2023-01-01 PROCEDURE — 71045 X-RAY EXAM CHEST 1 VIEW: CPT | Mod: 26

## 2023-01-01 PROCEDURE — 84439 ASSAY OF FREE THYROXINE: CPT

## 2023-01-01 PROCEDURE — 84443 ASSAY THYROID STIM HORMONE: CPT

## 2023-01-01 PROCEDURE — 85730 THROMBOPLASTIN TIME PARTIAL: CPT

## 2023-01-01 PROCEDURE — G0506: CPT

## 2023-01-01 PROCEDURE — 99497 ADVNCD CARE PLAN 30 MIN: CPT

## 2023-01-01 PROCEDURE — 96375 TX/PRO/DX INJ NEW DRUG ADDON: CPT

## 2023-01-01 PROCEDURE — 84132 ASSAY OF SERUM POTASSIUM: CPT

## 2023-01-01 PROCEDURE — 83605 ASSAY OF LACTIC ACID: CPT

## 2023-01-01 PROCEDURE — 93971 EXTREMITY STUDY: CPT

## 2023-01-01 PROCEDURE — 93010 ELECTROCARDIOGRAM REPORT: CPT

## 2023-01-01 PROCEDURE — 93306 TTE W/DOPPLER COMPLETE: CPT

## 2023-01-01 PROCEDURE — 84295 ASSAY OF SERUM SODIUM: CPT

## 2023-01-01 PROCEDURE — G0439: CPT

## 2023-01-01 PROCEDURE — 72100 X-RAY EXAM L-S SPINE 2/3 VWS: CPT | Mod: 26

## 2023-01-01 PROCEDURE — 36415 COLL VENOUS BLD VENIPUNCTURE: CPT

## 2023-01-01 PROCEDURE — 71045 X-RAY EXAM CHEST 1 VIEW: CPT

## 2023-01-01 PROCEDURE — 84436 ASSAY OF TOTAL THYROXINE: CPT

## 2023-01-01 PROCEDURE — 93306 TTE W/DOPPLER COMPLETE: CPT | Mod: 26

## 2023-01-01 PROCEDURE — 85018 HEMOGLOBIN: CPT

## 2023-01-01 PROCEDURE — 72170 X-RAY EXAM OF PELVIS: CPT | Mod: 26

## 2023-01-01 PROCEDURE — 81001 URINALYSIS AUTO W/SCOPE: CPT

## 2023-01-01 PROCEDURE — 74177 CT ABD & PELVIS W/CONTRAST: CPT | Mod: MA

## 2023-01-01 PROCEDURE — 83735 ASSAY OF MAGNESIUM: CPT

## 2023-01-01 PROCEDURE — 93308 TTE F-UP OR LMTD: CPT

## 2023-01-01 PROCEDURE — 80053 COMPREHEN METABOLIC PANEL: CPT

## 2023-01-01 PROCEDURE — 82803 BLOOD GASES ANY COMBINATION: CPT

## 2023-01-01 PROCEDURE — 84480 ASSAY TRIIODOTHYRONINE (T3): CPT

## 2023-01-01 PROCEDURE — 93308 TTE F-UP OR LMTD: CPT | Mod: 26

## 2023-01-01 PROCEDURE — 82435 ASSAY OF BLOOD CHLORIDE: CPT

## 2023-01-01 PROCEDURE — 85014 HEMATOCRIT: CPT

## 2023-01-01 PROCEDURE — 99348 HOME/RES VST EST LOW MDM 30: CPT | Mod: 25

## 2023-01-01 PROCEDURE — 83690 ASSAY OF LIPASE: CPT

## 2023-01-01 PROCEDURE — 85025 COMPLETE CBC W/AUTO DIFF WBC: CPT

## 2023-01-01 PROCEDURE — 99222 1ST HOSP IP/OBS MODERATE 55: CPT

## 2023-01-01 PROCEDURE — 87640 STAPH A DNA AMP PROBE: CPT

## 2023-01-01 PROCEDURE — 82947 ASSAY GLUCOSE BLOOD QUANT: CPT

## 2023-01-01 RX ORDER — SODIUM CHLORIDE 9 MG/ML
1000 INJECTION INTRAMUSCULAR; INTRAVENOUS; SUBCUTANEOUS
Refills: 0 | Status: DISCONTINUED | OUTPATIENT
Start: 2023-01-01 | End: 2023-01-01

## 2023-01-01 RX ORDER — APIXABAN 2.5 MG/1
2.5 TABLET, FILM COATED ORAL
Qty: 180 | Refills: 3 | Status: ACTIVE | COMMUNITY
Start: 2022-02-15

## 2023-01-01 RX ORDER — ASPIRIN/CALCIUM CARB/MAGNESIUM 324 MG
162 TABLET ORAL ONCE
Refills: 0 | Status: COMPLETED | OUTPATIENT
Start: 2023-01-01 | End: 2023-01-01

## 2023-01-01 RX ORDER — SENNA PLUS 8.6 MG/1
2 TABLET ORAL AT BEDTIME
Refills: 0 | Status: DISCONTINUED | OUTPATIENT
Start: 2023-01-01 | End: 2023-01-01

## 2023-01-01 RX ORDER — LANOLIN ALCOHOL/MO/W.PET/CERES
3 CREAM (GRAM) TOPICAL AT BEDTIME
Refills: 0 | Status: DISCONTINUED | OUTPATIENT
Start: 2023-01-01 | End: 2023-01-01

## 2023-01-01 RX ORDER — MAGNESIUM SULFATE 500 MG/ML
1 VIAL (ML) INJECTION ONCE
Refills: 0 | Status: COMPLETED | OUTPATIENT
Start: 2023-01-01 | End: 2023-01-01

## 2023-01-01 RX ORDER — IBUPROFEN 200 MG
600 TABLET ORAL ONCE
Refills: 0 | Status: COMPLETED | OUTPATIENT
Start: 2023-01-01 | End: 2023-01-01

## 2023-01-01 RX ORDER — METOCLOPRAMIDE HCL 10 MG
5 TABLET ORAL
Refills: 0 | Status: DISCONTINUED | OUTPATIENT
Start: 2023-01-01 | End: 2023-01-01

## 2023-01-01 RX ORDER — GABAPENTIN 400 MG/1
1 CAPSULE ORAL
Qty: 60 | Refills: 0
Start: 2023-01-01 | End: 2023-01-01

## 2023-01-01 RX ORDER — ACETAMINOPHEN 500 MG
1000 TABLET ORAL ONCE
Refills: 0 | Status: COMPLETED | OUTPATIENT
Start: 2023-01-01 | End: 2023-01-01

## 2023-01-01 RX ORDER — POTASSIUM CHLORIDE 20 MEQ
40 PACKET (EA) ORAL ONCE
Refills: 0 | Status: COMPLETED | OUTPATIENT
Start: 2023-01-01 | End: 2023-01-01

## 2023-01-01 RX ORDER — BISACODYL 5 MG/1
5 TABLET ORAL
Qty: 90 | Refills: 0 | Status: ACTIVE | COMMUNITY
Start: 2022-01-01

## 2023-01-01 RX ORDER — ACETAMINOPHEN 500 MG
650 TABLET ORAL EVERY 6 HOURS
Refills: 0 | Status: COMPLETED | OUTPATIENT
Start: 2023-01-01 | End: 2023-01-01

## 2023-01-01 RX ORDER — POTASSIUM CHLORIDE 20 MEQ
40 PACKET (EA) ORAL ONCE
Refills: 0 | Status: DISCONTINUED | OUTPATIENT
Start: 2023-01-01 | End: 2023-01-01

## 2023-01-01 RX ORDER — FUROSEMIDE 40 MG/1
40 TABLET ORAL
Qty: 180 | Refills: 3 | Status: ACTIVE | COMMUNITY
Start: 2022-03-06

## 2023-01-01 RX ORDER — POTASSIUM CHLORIDE 20 MEQ
40 PACKET (EA) ORAL EVERY 4 HOURS
Refills: 0 | Status: COMPLETED | OUTPATIENT
Start: 2023-01-01 | End: 2023-01-01

## 2023-01-01 RX ORDER — HYDROMORPHONE HYDROCHLORIDE 2 MG/ML
0.2 INJECTION INTRAMUSCULAR; INTRAVENOUS; SUBCUTANEOUS EVERY 6 HOURS
Refills: 0 | Status: DISCONTINUED | OUTPATIENT
Start: 2023-01-01 | End: 2023-01-01

## 2023-01-01 RX ORDER — HYDROCORTISONE 0.5 %
1 CREAM (GRAM) TOPICAL
Qty: 0 | Refills: 0 | DISCHARGE

## 2023-01-01 RX ORDER — ACETAMINOPHEN 500 MG
650 TABLET ORAL EVERY 6 HOURS
Refills: 0 | Status: DISCONTINUED | OUTPATIENT
Start: 2023-01-01 | End: 2023-01-01

## 2023-01-01 RX ORDER — ALLOPURINOL 300 MG/1
300 TABLET ORAL DAILY
Qty: 90 | Refills: 3 | Status: ACTIVE | COMMUNITY
Start: 2023-01-01

## 2023-01-01 RX ORDER — APIXABAN 2.5 MG/1
2.5 TABLET, FILM COATED ORAL EVERY 12 HOURS
Refills: 0 | Status: DISCONTINUED | OUTPATIENT
Start: 2023-01-01 | End: 2023-01-01

## 2023-01-01 RX ORDER — OXYCODONE HYDROCHLORIDE 5 MG/1
1 TABLET ORAL
Qty: 8 | Refills: 0
Start: 2023-01-01 | End: 2023-01-01

## 2023-01-01 RX ORDER — SIMVASTATIN 20 MG/1
1 TABLET, FILM COATED ORAL
Qty: 0 | Refills: 0 | DISCHARGE
Start: 2023-01-01

## 2023-01-01 RX ORDER — SODIUM CHLORIDE 9 MG/ML
1000 INJECTION, SOLUTION INTRAVENOUS
Refills: 0 | Status: DISCONTINUED | OUTPATIENT
Start: 2023-01-01 | End: 2023-01-01

## 2023-01-01 RX ORDER — POTASSIUM CHLORIDE 20 MEQ
40 PACKET (EA) ORAL EVERY 4 HOURS
Refills: 0 | Status: DISCONTINUED | OUTPATIENT
Start: 2023-01-01 | End: 2023-01-01

## 2023-01-01 RX ORDER — POLYETHYLENE GLYCOL 3350 17 G/17G
17 POWDER, FOR SOLUTION ORAL DAILY
Refills: 0 | Status: DISCONTINUED | OUTPATIENT
Start: 2023-01-01 | End: 2023-01-01

## 2023-01-01 RX ORDER — METOCLOPRAMIDE HCL 10 MG
TABLET ORAL
Refills: 0 | Status: DISCONTINUED | OUTPATIENT
Start: 2023-01-01 | End: 2023-01-01

## 2023-01-01 RX ORDER — POTASSIUM CHLORIDE 20 MEQ
10 PACKET (EA) ORAL
Refills: 0 | Status: COMPLETED | OUTPATIENT
Start: 2023-01-01 | End: 2023-01-01

## 2023-01-01 RX ORDER — PANTOPRAZOLE SODIUM 20 MG/1
1 TABLET, DELAYED RELEASE ORAL
Qty: 30 | Refills: 0
Start: 2023-01-01 | End: 2023-01-01

## 2023-01-01 RX ORDER — APIXABAN 2.5 MG/1
1 TABLET, FILM COATED ORAL
Qty: 0 | Refills: 0 | DISCHARGE

## 2023-01-01 RX ORDER — SIMVASTATIN 20 MG/1
10 TABLET, FILM COATED ORAL AT BEDTIME
Refills: 0 | Status: DISCONTINUED | OUTPATIENT
Start: 2023-01-01 | End: 2023-01-01

## 2023-01-01 RX ORDER — NALOXONE HYDROCHLORIDE 4 MG/.1ML
4 SPRAY NASAL
Qty: 1 | Refills: 0
Start: 2023-01-01 | End: 2023-01-01

## 2023-01-01 RX ORDER — CHLORHEXIDINE GLUCONATE 213 G/1000ML
1 SOLUTION TOPICAL DAILY
Refills: 0 | Status: DISCONTINUED | OUTPATIENT
Start: 2023-01-01 | End: 2023-01-01

## 2023-01-01 RX ORDER — POTASSIUM CHLORIDE 20 MEQ
10 PACKET (EA) ORAL ONCE
Refills: 0 | Status: COMPLETED | OUTPATIENT
Start: 2023-01-01 | End: 2023-01-01

## 2023-01-01 RX ORDER — POLYETHYLENE GLYCOL 3350 17 G/17G
17 POWDER, FOR SOLUTION ORAL
Qty: 0 | Refills: 0 | DISCHARGE
Start: 2023-01-01

## 2023-01-01 RX ORDER — METOPROLOL SUCCINATE 25 MG/1
25 TABLET, EXTENDED RELEASE ORAL
Qty: 90 | Refills: 3 | Status: ACTIVE | COMMUNITY
Start: 2022-03-06

## 2023-01-01 RX ORDER — PANTOPRAZOLE SODIUM 20 MG/1
1 TABLET, DELAYED RELEASE ORAL
Qty: 0 | Refills: 0 | DISCHARGE
Start: 2023-01-01

## 2023-01-01 RX ORDER — CEPHALEXIN 500 MG/1
500 TABLET ORAL 3 TIMES DAILY
Qty: 30 | Refills: 0 | Status: DISCONTINUED | COMMUNITY
Start: 2023-01-01 | End: 2023-01-01

## 2023-01-01 RX ORDER — POTASSIUM CHLORIDE 20 MEQ
1 PACKET (EA) ORAL
Refills: 0 | DISCHARGE

## 2023-01-01 RX ORDER — CEPHALEXIN 500 MG/1
500 CAPSULE ORAL
Qty: 21 | Refills: 0 | Status: COMPLETED | COMMUNITY
Start: 2023-01-01 | End: 2023-01-01

## 2023-01-01 RX ORDER — POTASSIUM CHLORIDE 20 MEQ
20 PACKET (EA) ORAL
Refills: 0 | Status: DISCONTINUED | OUTPATIENT
Start: 2023-01-01 | End: 2023-01-01

## 2023-01-01 RX ORDER — ONDANSETRON 8 MG/1
4 TABLET, FILM COATED ORAL EVERY 8 HOURS
Refills: 0 | Status: DISCONTINUED | OUTPATIENT
Start: 2023-01-01 | End: 2023-01-01

## 2023-01-01 RX ORDER — METOLAZONE 2.5 MG/1
2.5 TABLET ORAL
Qty: 36 | Refills: 3 | Status: ACTIVE | COMMUNITY
Start: 2022-03-06

## 2023-01-01 RX ORDER — CEFTRIAXONE 500 MG/1
1000 INJECTION, POWDER, FOR SOLUTION INTRAMUSCULAR; INTRAVENOUS EVERY 24 HOURS
Refills: 0 | Status: DISCONTINUED | OUTPATIENT
Start: 2023-01-01 | End: 2023-01-01

## 2023-01-01 RX ORDER — POTASSIUM CHLORIDE 20 MEQ
20 PACKET (EA) ORAL ONCE
Refills: 0 | Status: COMPLETED | OUTPATIENT
Start: 2023-01-01 | End: 2023-01-01

## 2023-01-01 RX ORDER — POTASSIUM CHLORIDE 1500 MG/1
20 TABLET, EXTENDED RELEASE ORAL 4 TIMES DAILY
Refills: 0 | Status: ACTIVE | COMMUNITY
Start: 2022-01-01

## 2023-01-01 RX ORDER — PANTOPRAZOLE SODIUM 20 MG/1
40 TABLET, DELAYED RELEASE ORAL
Refills: 0 | Status: DISCONTINUED | OUTPATIENT
Start: 2023-01-01 | End: 2023-01-01

## 2023-01-01 RX ORDER — METOPROLOL TARTRATE 50 MG
1 TABLET ORAL
Qty: 0 | Refills: 0 | DISCHARGE

## 2023-01-01 RX ORDER — POTASSIUM CHLORIDE 20 MEQ
10 PACKET (EA) ORAL ONCE
Refills: 0 | Status: DISCONTINUED | OUTPATIENT
Start: 2023-01-01 | End: 2023-01-01

## 2023-01-01 RX ORDER — SODIUM CHLORIDE 9 MG/ML
500 INJECTION INTRAMUSCULAR; INTRAVENOUS; SUBCUTANEOUS ONCE
Refills: 0 | Status: COMPLETED | OUTPATIENT
Start: 2023-01-01 | End: 2023-01-01

## 2023-01-01 RX ORDER — CHLORHEXIDINE GLUCONATE 213 G/1000ML
1 SOLUTION TOPICAL
Refills: 0 | Status: DISCONTINUED | OUTPATIENT
Start: 2023-01-01 | End: 2023-01-01

## 2023-01-01 RX ORDER — SENNA PLUS 8.6 MG/1
2 TABLET ORAL
Qty: 0 | Refills: 0 | DISCHARGE
Start: 2023-01-01

## 2023-01-01 RX ORDER — METOPROLOL TARTRATE 50 MG
25 TABLET ORAL DAILY
Refills: 0 | Status: DISCONTINUED | OUTPATIENT
Start: 2023-01-01 | End: 2023-01-01

## 2023-01-01 RX ORDER — METOLAZONE 5 MG/1
1 TABLET ORAL
Refills: 0 | DISCHARGE

## 2023-01-01 RX ORDER — LANSOPRAZOLE 15 MG/1
1 CAPSULE, DELAYED RELEASE ORAL
Qty: 0 | Refills: 0 | DISCHARGE

## 2023-01-01 RX ORDER — METOCLOPRAMIDE HCL 10 MG
5 TABLET ORAL ONCE
Refills: 0 | Status: COMPLETED | OUTPATIENT
Start: 2023-01-01 | End: 2023-01-01

## 2023-01-01 RX ORDER — LIDOCAINE 4 G/100G
1 CREAM TOPICAL ONCE
Refills: 0 | Status: COMPLETED | OUTPATIENT
Start: 2023-01-01 | End: 2023-01-01

## 2023-01-01 RX ORDER — METRONIDAZOLE 500 MG
500 TABLET ORAL EVERY 8 HOURS
Refills: 0 | Status: DISCONTINUED | OUTPATIENT
Start: 2023-01-01 | End: 2023-01-01

## 2023-01-01 RX ORDER — OXYCODONE HYDROCHLORIDE 5 MG/1
5 TABLET ORAL EVERY 6 HOURS
Refills: 0 | Status: DISCONTINUED | OUTPATIENT
Start: 2023-01-01 | End: 2023-01-01

## 2023-01-01 RX ORDER — TRAMADOL HYDROCHLORIDE 50 MG/1
50 TABLET, COATED ORAL
Qty: 30 | Refills: 0 | Status: ACTIVE | COMMUNITY
Start: 2022-01-01 | End: 1900-01-01

## 2023-01-01 RX ORDER — AMIODARONE HYDROCHLORIDE 400 MG/1
1 TABLET ORAL
Qty: 0 | Refills: 0 | DISCHARGE

## 2023-01-01 RX ORDER — MORPHINE SULFATE 50 MG/1
2 CAPSULE, EXTENDED RELEASE ORAL ONCE
Refills: 0 | Status: DISCONTINUED | OUTPATIENT
Start: 2023-01-01 | End: 2023-01-01

## 2023-01-01 RX ORDER — GABAPENTIN 400 MG/1
100 CAPSULE ORAL
Refills: 0 | Status: DISCONTINUED | OUTPATIENT
Start: 2023-01-01 | End: 2023-01-01

## 2023-01-01 RX ORDER — LIDOCAINE 4 G/100G
1 CREAM TOPICAL DAILY
Refills: 0 | Status: DISCONTINUED | OUTPATIENT
Start: 2023-01-01 | End: 2023-01-01

## 2023-01-01 RX ORDER — MIRTAZAPINE 45 MG/1
7.5 TABLET, ORALLY DISINTEGRATING ORAL AT BEDTIME
Refills: 0 | Status: DISCONTINUED | OUTPATIENT
Start: 2023-01-01 | End: 2023-01-01

## 2023-01-01 RX ADMIN — POLYETHYLENE GLYCOL 3350 17 GRAM(S): 17 POWDER, FOR SOLUTION ORAL at 12:40

## 2023-01-01 RX ADMIN — GABAPENTIN 100 MILLIGRAM(S): 400 CAPSULE ORAL at 06:20

## 2023-01-01 RX ADMIN — Medication 650 MILLIGRAM(S): at 23:26

## 2023-01-01 RX ADMIN — Medication 650 MILLIGRAM(S): at 23:37

## 2023-01-01 RX ADMIN — Medication 40 MILLIEQUIVALENT(S): at 04:18

## 2023-01-01 RX ADMIN — Medication 5 MILLIGRAM(S): at 17:26

## 2023-01-01 RX ADMIN — Medication 650 MILLIGRAM(S): at 05:38

## 2023-01-01 RX ADMIN — Medication 100 GRAM(S): at 04:31

## 2023-01-01 RX ADMIN — GABAPENTIN 100 MILLIGRAM(S): 400 CAPSULE ORAL at 06:18

## 2023-01-01 RX ADMIN — GABAPENTIN 100 MILLIGRAM(S): 400 CAPSULE ORAL at 21:42

## 2023-01-01 RX ADMIN — MORPHINE SULFATE 2 MILLIGRAM(S): 50 CAPSULE, EXTENDED RELEASE ORAL at 13:53

## 2023-01-01 RX ADMIN — LIDOCAINE 1 PATCH: 4 CREAM TOPICAL at 11:05

## 2023-01-01 RX ADMIN — Medication 40 MILLIGRAM(S): at 18:14

## 2023-01-01 RX ADMIN — Medication 40 MILLIGRAM(S): at 17:14

## 2023-01-01 RX ADMIN — SODIUM CHLORIDE 75 MILLILITER(S): 9 INJECTION, SOLUTION INTRAVENOUS at 09:24

## 2023-01-01 RX ADMIN — APIXABAN 2.5 MILLIGRAM(S): 2.5 TABLET, FILM COATED ORAL at 05:10

## 2023-01-01 RX ADMIN — Medication 40 MILLIEQUIVALENT(S): at 21:33

## 2023-01-01 RX ADMIN — LIDOCAINE 1 PATCH: 4 CREAM TOPICAL at 18:05

## 2023-01-01 RX ADMIN — PANTOPRAZOLE SODIUM 40 MILLIGRAM(S): 20 TABLET, DELAYED RELEASE ORAL at 05:59

## 2023-01-01 RX ADMIN — Medication 25 MILLIGRAM(S): at 06:28

## 2023-01-01 RX ADMIN — APIXABAN 2.5 MILLIGRAM(S): 2.5 TABLET, FILM COATED ORAL at 17:18

## 2023-01-01 RX ADMIN — Medication 650 MILLIGRAM(S): at 11:57

## 2023-01-01 RX ADMIN — CEFTRIAXONE 100 MILLIGRAM(S): 500 INJECTION, POWDER, FOR SOLUTION INTRAMUSCULAR; INTRAVENOUS at 12:04

## 2023-01-01 RX ADMIN — MIRTAZAPINE 7.5 MILLIGRAM(S): 45 TABLET, ORALLY DISINTEGRATING ORAL at 21:33

## 2023-01-01 RX ADMIN — LIDOCAINE 1 PATCH: 4 CREAM TOPICAL at 18:11

## 2023-01-01 RX ADMIN — OXYCODONE HYDROCHLORIDE 5 MILLIGRAM(S): 5 TABLET ORAL at 01:38

## 2023-01-01 RX ADMIN — LIDOCAINE 1 PATCH: 4 CREAM TOPICAL at 19:14

## 2023-01-01 RX ADMIN — Medication 650 MILLIGRAM(S): at 00:26

## 2023-01-01 RX ADMIN — Medication 40 MILLIGRAM(S): at 06:29

## 2023-01-01 RX ADMIN — LIDOCAINE 1 PATCH: 4 CREAM TOPICAL at 01:00

## 2023-01-01 RX ADMIN — SENNA PLUS 2 TABLET(S): 8.6 TABLET ORAL at 22:17

## 2023-01-01 RX ADMIN — Medication 40 MILLIGRAM(S): at 19:15

## 2023-01-01 RX ADMIN — APIXABAN 2.5 MILLIGRAM(S): 2.5 TABLET, FILM COATED ORAL at 19:27

## 2023-01-01 RX ADMIN — Medication 5 MILLIGRAM(S): at 12:43

## 2023-01-01 RX ADMIN — Medication 25 MILLIGRAM(S): at 06:20

## 2023-01-01 RX ADMIN — Medication 400 MILLIGRAM(S): at 12:17

## 2023-01-01 RX ADMIN — GABAPENTIN 100 MILLIGRAM(S): 400 CAPSULE ORAL at 06:02

## 2023-01-01 RX ADMIN — Medication 650 MILLIGRAM(S): at 19:26

## 2023-01-01 RX ADMIN — APIXABAN 2.5 MILLIGRAM(S): 2.5 TABLET, FILM COATED ORAL at 06:19

## 2023-01-01 RX ADMIN — Medication 5 MILLIGRAM(S): at 05:10

## 2023-01-01 RX ADMIN — PANTOPRAZOLE SODIUM 40 MILLIGRAM(S): 20 TABLET, DELAYED RELEASE ORAL at 05:44

## 2023-01-01 RX ADMIN — Medication 650 MILLIGRAM(S): at 11:04

## 2023-01-01 RX ADMIN — CHLORHEXIDINE GLUCONATE 1 APPLICATION(S): 213 SOLUTION TOPICAL at 11:08

## 2023-01-01 RX ADMIN — Medication 40 MILLIEQUIVALENT(S): at 11:57

## 2023-01-01 RX ADMIN — Medication 650 MILLIGRAM(S): at 12:21

## 2023-01-01 RX ADMIN — Medication 650 MILLIGRAM(S): at 02:12

## 2023-01-01 RX ADMIN — Medication 40 MILLIGRAM(S): at 17:23

## 2023-01-01 RX ADMIN — PANTOPRAZOLE SODIUM 40 MILLIGRAM(S): 20 TABLET, DELAYED RELEASE ORAL at 06:18

## 2023-01-01 RX ADMIN — SIMVASTATIN 10 MILLIGRAM(S): 20 TABLET, FILM COATED ORAL at 22:19

## 2023-01-01 RX ADMIN — SODIUM CHLORIDE 75 MILLILITER(S): 9 INJECTION, SOLUTION INTRAVENOUS at 21:42

## 2023-01-01 RX ADMIN — Medication 600 MILLIGRAM(S): at 06:50

## 2023-01-01 RX ADMIN — Medication 40 MILLIGRAM(S): at 05:38

## 2023-01-01 RX ADMIN — APIXABAN 2.5 MILLIGRAM(S): 2.5 TABLET, FILM COATED ORAL at 17:26

## 2023-01-01 RX ADMIN — OXYCODONE HYDROCHLORIDE 5 MILLIGRAM(S): 5 TABLET ORAL at 19:35

## 2023-01-01 RX ADMIN — CHLORHEXIDINE GLUCONATE 1 APPLICATION(S): 213 SOLUTION TOPICAL at 12:05

## 2023-01-01 RX ADMIN — APIXABAN 2.5 MILLIGRAM(S): 2.5 TABLET, FILM COATED ORAL at 17:21

## 2023-01-01 RX ADMIN — Medication 650 MILLIGRAM(S): at 12:39

## 2023-01-01 RX ADMIN — PANTOPRAZOLE SODIUM 40 MILLIGRAM(S): 20 TABLET, DELAYED RELEASE ORAL at 06:29

## 2023-01-01 RX ADMIN — CHLORHEXIDINE GLUCONATE 1 APPLICATION(S): 213 SOLUTION TOPICAL at 12:41

## 2023-01-01 RX ADMIN — APIXABAN 2.5 MILLIGRAM(S): 2.5 TABLET, FILM COATED ORAL at 06:30

## 2023-01-01 RX ADMIN — GABAPENTIN 100 MILLIGRAM(S): 400 CAPSULE ORAL at 06:28

## 2023-01-01 RX ADMIN — Medication 40 MILLIEQUIVALENT(S): at 04:32

## 2023-01-01 RX ADMIN — GABAPENTIN 100 MILLIGRAM(S): 400 CAPSULE ORAL at 13:40

## 2023-01-01 RX ADMIN — Medication 40 MILLIGRAM(S): at 19:27

## 2023-01-01 RX ADMIN — GABAPENTIN 100 MILLIGRAM(S): 400 CAPSULE ORAL at 18:13

## 2023-01-01 RX ADMIN — GABAPENTIN 100 MILLIGRAM(S): 400 CAPSULE ORAL at 05:39

## 2023-01-01 RX ADMIN — LIDOCAINE 1 PATCH: 4 CREAM TOPICAL at 06:43

## 2023-01-01 RX ADMIN — Medication 40 MILLIGRAM(S): at 17:15

## 2023-01-01 RX ADMIN — Medication 100 MILLIEQUIVALENT(S): at 05:44

## 2023-01-01 RX ADMIN — GABAPENTIN 100 MILLIGRAM(S): 400 CAPSULE ORAL at 17:15

## 2023-01-01 RX ADMIN — Medication 650 MILLIGRAM(S): at 06:38

## 2023-01-01 RX ADMIN — Medication 100 MILLIEQUIVALENT(S): at 14:29

## 2023-01-01 RX ADMIN — Medication 650 MILLIGRAM(S): at 01:12

## 2023-01-01 RX ADMIN — SODIUM CHLORIDE 60 MILLILITER(S): 9 INJECTION INTRAMUSCULAR; INTRAVENOUS; SUBCUTANEOUS at 14:35

## 2023-01-01 RX ADMIN — APIXABAN 2.5 MILLIGRAM(S): 2.5 TABLET, FILM COATED ORAL at 18:13

## 2023-01-01 RX ADMIN — SENNA PLUS 2 TABLET(S): 8.6 TABLET ORAL at 22:15

## 2023-01-01 RX ADMIN — APIXABAN 2.5 MILLIGRAM(S): 2.5 TABLET, FILM COATED ORAL at 05:59

## 2023-01-01 RX ADMIN — CHLORHEXIDINE GLUCONATE 1 APPLICATION(S): 213 SOLUTION TOPICAL at 11:24

## 2023-01-01 RX ADMIN — POLYETHYLENE GLYCOL 3350 17 GRAM(S): 17 POWDER, FOR SOLUTION ORAL at 11:04

## 2023-01-01 RX ADMIN — Medication 650 MILLIGRAM(S): at 18:14

## 2023-01-01 RX ADMIN — SODIUM CHLORIDE 500 MILLILITER(S): 9 INJECTION INTRAMUSCULAR; INTRAVENOUS; SUBCUTANEOUS at 05:44

## 2023-01-01 RX ADMIN — POLYETHYLENE GLYCOL 3350 17 GRAM(S): 17 POWDER, FOR SOLUTION ORAL at 12:45

## 2023-01-01 RX ADMIN — OXYCODONE HYDROCHLORIDE 5 MILLIGRAM(S): 5 TABLET ORAL at 07:03

## 2023-01-01 RX ADMIN — Medication 20 MILLIEQUIVALENT(S): at 17:28

## 2023-01-01 RX ADMIN — OXYCODONE HYDROCHLORIDE 5 MILLIGRAM(S): 5 TABLET ORAL at 18:30

## 2023-01-01 RX ADMIN — Medication 40 MILLIEQUIVALENT(S): at 14:29

## 2023-01-01 RX ADMIN — Medication 3 MILLIGRAM(S): at 02:28

## 2023-01-01 RX ADMIN — APIXABAN 2.5 MILLIGRAM(S): 2.5 TABLET, FILM COATED ORAL at 17:13

## 2023-01-01 RX ADMIN — SENNA PLUS 2 TABLET(S): 8.6 TABLET ORAL at 21:43

## 2023-01-01 RX ADMIN — Medication 650 MILLIGRAM(S): at 19:14

## 2023-01-01 RX ADMIN — POLYETHYLENE GLYCOL 3350 17 GRAM(S): 17 POWDER, FOR SOLUTION ORAL at 17:35

## 2023-01-01 RX ADMIN — Medication 40 MILLIEQUIVALENT(S): at 18:12

## 2023-01-01 RX ADMIN — OXYCODONE HYDROCHLORIDE 5 MILLIGRAM(S): 5 TABLET ORAL at 08:02

## 2023-01-01 RX ADMIN — OXYCODONE HYDROCHLORIDE 5 MILLIGRAM(S): 5 TABLET ORAL at 00:38

## 2023-01-01 RX ADMIN — LIDOCAINE 1 PATCH: 4 CREAM TOPICAL at 06:50

## 2023-01-01 RX ADMIN — Medication 650 MILLIGRAM(S): at 17:21

## 2023-01-01 RX ADMIN — CHLORHEXIDINE GLUCONATE 1 APPLICATION(S): 213 SOLUTION TOPICAL at 12:52

## 2023-01-01 RX ADMIN — LIDOCAINE 1 PATCH: 4 CREAM TOPICAL at 12:38

## 2023-01-01 RX ADMIN — APIXABAN 2.5 MILLIGRAM(S): 2.5 TABLET, FILM COATED ORAL at 05:39

## 2023-01-01 RX ADMIN — APIXABAN 2.5 MILLIGRAM(S): 2.5 TABLET, FILM COATED ORAL at 17:16

## 2023-01-01 RX ADMIN — Medication 1000 MILLIGRAM(S): at 12:30

## 2023-01-01 RX ADMIN — Medication 25 MILLIGRAM(S): at 06:19

## 2023-01-01 RX ADMIN — GABAPENTIN 100 MILLIGRAM(S): 400 CAPSULE ORAL at 19:28

## 2023-01-01 RX ADMIN — Medication 650 MILLIGRAM(S): at 06:29

## 2023-01-01 RX ADMIN — Medication 25 MILLIGRAM(S): at 05:39

## 2023-01-01 RX ADMIN — Medication 162 MILLIGRAM(S): at 04:31

## 2023-01-01 RX ADMIN — CHLORHEXIDINE GLUCONATE 1 APPLICATION(S): 213 SOLUTION TOPICAL at 13:39

## 2023-01-01 RX ADMIN — Medication 40 MILLIEQUIVALENT(S): at 17:26

## 2023-01-01 RX ADMIN — OXYCODONE HYDROCHLORIDE 5 MILLIGRAM(S): 5 TABLET ORAL at 18:48

## 2023-01-01 RX ADMIN — SENNA PLUS 2 TABLET(S): 8.6 TABLET ORAL at 22:19

## 2023-01-01 RX ADMIN — Medication 650 MILLIGRAM(S): at 18:15

## 2023-01-01 RX ADMIN — Medication 40 MILLIGRAM(S): at 06:19

## 2023-01-01 RX ADMIN — Medication 5 MILLIGRAM(S): at 12:05

## 2023-01-01 RX ADMIN — GABAPENTIN 100 MILLIGRAM(S): 400 CAPSULE ORAL at 17:21

## 2023-01-01 RX ADMIN — POLYETHYLENE GLYCOL 3350 17 GRAM(S): 17 POWDER, FOR SOLUTION ORAL at 11:29

## 2023-01-01 RX ADMIN — Medication 650 MILLIGRAM(S): at 19:31

## 2023-01-01 RX ADMIN — CHLORHEXIDINE GLUCONATE 1 APPLICATION(S): 213 SOLUTION TOPICAL at 12:01

## 2023-01-01 RX ADMIN — SENNA PLUS 2 TABLET(S): 8.6 TABLET ORAL at 21:46

## 2023-01-01 RX ADMIN — LIDOCAINE 1 PATCH: 4 CREAM TOPICAL at 19:32

## 2023-01-01 RX ADMIN — PANTOPRAZOLE SODIUM 40 MILLIGRAM(S): 20 TABLET, DELAYED RELEASE ORAL at 06:20

## 2023-01-01 RX ADMIN — Medication 650 MILLIGRAM(S): at 00:37

## 2023-01-01 RX ADMIN — Medication 650 MILLIGRAM(S): at 05:59

## 2023-01-01 RX ADMIN — Medication 650 MILLIGRAM(S): at 13:38

## 2023-01-01 RX ADMIN — Medication 40 MILLIEQUIVALENT(S): at 09:26

## 2023-01-01 RX ADMIN — MORPHINE SULFATE 2 MILLIGRAM(S): 50 CAPSULE, EXTENDED RELEASE ORAL at 13:38

## 2023-01-01 RX ADMIN — Medication 5 MILLIGRAM(S): at 16:48

## 2023-01-01 RX ADMIN — Medication 40 MILLIGRAM(S): at 05:59

## 2023-01-01 RX ADMIN — POLYETHYLENE GLYCOL 3350 17 GRAM(S): 17 POWDER, FOR SOLUTION ORAL at 11:57

## 2023-01-01 RX ADMIN — APIXABAN 2.5 MILLIGRAM(S): 2.5 TABLET, FILM COATED ORAL at 06:18

## 2023-01-01 RX ADMIN — CEFTRIAXONE 100 MILLIGRAM(S): 500 INJECTION, POWDER, FOR SOLUTION INTRAMUSCULAR; INTRAVENOUS at 12:44

## 2023-01-01 RX ADMIN — APIXABAN 2.5 MILLIGRAM(S): 2.5 TABLET, FILM COATED ORAL at 17:28

## 2023-01-01 RX ADMIN — OXYCODONE HYDROCHLORIDE 5 MILLIGRAM(S): 5 TABLET ORAL at 17:58

## 2023-01-24 PROBLEM — L03.119 CELLULITIS OF FOOT: Status: ACTIVE | Noted: 2023-01-01

## 2023-03-28 NOTE — ED PROVIDER NOTE - CPE EDP [facility] NORM
Please refer to nursing note from yesterday; plan was to restart metolazone 2 times per week    BMP in 1 week.   CS   normal...

## 2023-04-24 PROBLEM — M10.9 GOUT, ARTHRITIS: Status: ACTIVE | Noted: 2023-01-01

## 2023-04-24 PROBLEM — E79.0 HYPERURICEMIA: Status: ACTIVE | Noted: 2023-01-01

## 2023-04-24 PROBLEM — I50.9 CHF (CONGESTIVE HEART FAILURE): Status: ACTIVE | Noted: 2022-03-06

## 2023-04-24 PROBLEM — N18.30 CHRONIC KIDNEY DISEASE, STAGE 3: Status: ACTIVE | Noted: 2022-02-15

## 2023-04-24 PROBLEM — Z71.89 ADVANCE CARE PLANNING: Status: RESOLVED | Noted: 2022-02-15 | Resolved: 2023-01-01

## 2023-04-24 NOTE — COUNSELING
[Improve mobility] : improve mobility [Improve pain control] : improve pain control [Maintain functional ability] : maintain functional ability [Completed Healthcare Proxy] : completed healthcare proxy [Completed Medical Orders for Life-Sustaining Treatment] : completed medical orders for life-sustaining treatment [Full Code] : Code Status: Full Code [Limited] : Treatment Guidelines: Limited [Trial of Intubation] : Intubation: Trial of Intubation [Last Ver[facility] Date: _____] : [facility] Completion/last verification date: [unfilled] [Normal Weight - ( BMI  <25 )] : normal weight - ( BMI  <25 ) [Continue diet as tolerated] : continue diet as tolerated based on goals of care [Non - Smoker] : non-smoker [Smoke/CO Detectors] : smoke/CO detectors [Use assistive device to avoid falls] : use assistive device to avoid falls [Remove clutter and unsafe carpeting to avoid falls] : remove clutter and unsafe carpeting to avoid falls [] : foot exam [Not Recommended] : Aspirin use not recommended due to overall prognosis [Established patient, extensive review of history, medical and functional status, risk factors and patient education] : Established patient, extensive review of history, medical and functional status, risk factors and patient education and counseling provided for subsequent annual wellness visit [ - New patient with 2 or more chronic conditions; CCM discussed and patient-centered care plan established] : New patient with 2 or more chronic conditions; CCM discussed and patient-centered care plan established

## 2023-04-24 NOTE — CHRONIC CARE ASSESSMENT
[Inadequate social support] : inadequate social support [PPS Score: ____] : Palliative Performance Scale (PPS) Score: [unfilled]

## 2023-04-26 NOTE — REASON FOR VISIT
[Subsequent Annual Medicare Wellness Visit] : a subsequent annual Medicare wellness visit [Pre-Visit Preparation] : pre-visit preparation was done [FreeTextEntry2] : chart review

## 2023-04-26 NOTE — HEALTH RISK ASSESSMENT
[Independent] : managing finances [Some assistance needed] : preparing meals [Full assistance needed] : using transportation [One fall no injury in past year] : Patient reported one fall in the past year without injury [Yes] : The patient does have visual impairment [HRA Reviewed] : Health risk assessment reviewed [TimeGetUpGo] : 0 [de-identified] : Uses glasses

## 2023-04-26 NOTE — HISTORY OF PRESENT ILLNESS
[Patient] : patient [FreeTextEntry1] : n/a  [FreeTextEntry2] : HPI:\par 89yo F with PMH of CAD s/p CABG () followed by stents, Afib (on Eliquis), pHTN seen for f/u visit.\par \par Social History:\par -. Son has . Congregation and has Scientologist support but doesn't utilize. \par -Caregivers: daughter helps, unclear how often\par -MOLST: Full Code, Trial intubation and feeding tube, IV/antibiotics ok and hospitalize. Reviewed 2022\par -HCP: Stephanie Smith (dtr) [phone]. Harjeet Valentine (alt) grandson\par -Cardiologist: Dr Alexei Ballard ([facility]), still follows as often as possible.\par \par Medical Issues:\par -CAD s/p CABG and stents: chronic. Continue torsemide 40mg BID and metoprolol succinate 25mg daily. \par -Afib: SR today. Continue Metoprolol and Eliquis\par -Pulmonary HTN: chronic. GODFREY. Continue lasix and metolazone. \par -Chronic constipation: miralax not working. Ordered Sennakot and Bisacodyl prn \par -Sciatica: pain controlled with tramadol\par -Hypokalemia: stable. reviewed lab. Continue potassium replacement\par -Gout: Recent acute event, both legs "blew up". Allopurinol 300mg daily, tramadol. \par -CHF: Furosemide changed, now on torsemide 40mg BID. Continues metolazone 2.5mg MWF\par \par Subjective:\par -Appetite/weight: appetite is good. Weight stable. \par -Gait/falls: unsteady gait. fall twice in the past year. \par -Pain: sciatica pain. Uses tramadol in the morning. \par -Sleep: poorly. wakes up multiple times \par -BMs: chronic constipation. Takes Miralax but doesn't think it will work. \par -Urine: continent. no dysuria, \par -Skin: intact but bruising due to eliquis\par -DME: walker, commode, but need shower chair, no grab bars\par -Mood/memory: mood is ok. memory is pretty good. \par -Communication: full conversation. \par -Hospitalizations in the past year:\par \par DME: Shower chair\par \par ADVANCE CARE PLANNING:\par 1. Total Time Spent: 20 min\par 2. Participants: Myself, patient\par 3. Discussion: Goals of Care, Advanced Directives, Health Care Agency, and Disease trajectory\par 4. Disease Trajectory: Discussed and reviewed that patient's health is currently stable, and the prognosis moving forward is unclear.\par 5. Prognosis, Based On Clinician Best Judgment: Indeterminate\par 6. Goals of Care: 1) Extend life, by hospitalization and aggressive measures if needed, 2) Avoid discomfort, 3) Manage medical problems at home where safely possible.\par 7. HCA: Stephanie Smith (dtr)\par 8. MOLST Completed: CPR yes, intubation and long-term ventilation, do hospitalize, no limitation on medical interventions, long-term feeding tube, trial IVF, use antibiotics.\par 9. Hospice Benefit discussion deferred at this time.

## 2023-04-26 NOTE — PHYSICAL EXAM
[No Acute Distress] : no acute distress [Well Nourished] : well nourished [Well Developed] : well developed [Normal Sclera/Conjunctiva] : normal sclera/conjunctiva [PERRL] : pupils equal, round and reactive to light [EOMI] : extra ocular movement intact [Normal Outer Ear/Nose] : the ears and nose were normal in appearance [Normal Oropharynx] : the oropharynx was normal [No JVD] : no jugular venous distention [Supple] : the neck was supple [No Respiratory Distress] : no respiratory distress [Clear to Auscultation] : lungs were clear to auscultation bilaterally [No Accessory Muscle Use] : no accessory muscle use [Normal Rate] : heart rate was normal  [Regular Rhythm] : with a regular rhythm [Normal S1, S2] : normal S1 and S2 [No Murmurs] : no murmurs heard [Pedal Pulses Present] : the pedal pulses are present [Normal Bowel Sounds] : normal bowel sounds [Non Tender] : non-tender [Soft] : abdomen soft [Not Distended] : not distended [No CVA Tenderness] : no ~M costovertebral angle tenderness [No Spinal Tenderness] : no spinal tenderness [No Joint Swelling] : no joint swelling seen [No Rash] : no rash [No Skin Lesions] : no skin lesions [Cranial Nerves Intact] : cranial nerves 2-12 were intact [No Motor Deficits] : the motor exam was normal [No Gross Sensory Deficits] : no gross sensory deficits [Oriented x3] : oriented to person, place, and time [Normal Affect] : the affect was normal [Normal Mood] : the mood was normal [de-identified] : pleasant [de-identified] : trace edema R>L [de-identified] : slow unsteady gait

## 2023-05-22 NOTE — ED PROVIDER NOTE - MUSCULOSKELETAL MINIMAL EXAM
No midline tenderness of the cervical, thoracic or lumbar spine. No paraspinal tenderness. + R SI joint tenderness to palpation.

## 2023-05-22 NOTE — ED CLERICAL - NS ED CLERK NOTE PRE-ARRIVAL INFORMATION; ADDITIONAL PRE-ARRIVAL INFORMATION

## 2023-05-22 NOTE — ED PROVIDER NOTE - CLINICAL SUMMARY MEDICAL DECISION MAKING FREE TEXT BOX
89 Y/O F PMH CAD A FIB HLD p/w a worsening of her chronic R sided back pain. Pt denies recent falls or trauma, uses a walker, lives along but states her daughter is close by. Pt's PCP is Dr. Beulah Easley. Plan is pain control with Ibuprofen and lidoderm, X-rays to eval for osseous abnormality. No acute distress.

## 2023-05-22 NOTE — ED PROVIDER NOTE - SECONDARY DIAGNOSIS.
Patient called back at [facility] for ENT - she seemed a little confused as she was explaining that her prescription isn't working and she wants to know what to do about this. She said that something is going on with her, stating that she is having an inner ear problem that is making her dizzy if she lies down and said sensitivity is also a problem. She is wanting to be referred elsewhere for ENT as she is not wanting to wait to be scheduled for an appt in May or June. I suggested that she call Dr. Magda Baxter regarding the medication/symptoms issues and let him know that she would like to be referred elsewhere.  Patient agreed Unsteady gait

## 2023-05-22 NOTE — ED ADULT NURSE REASSESSMENT NOTE - NS ED NURSE REASSESS COMMENT FT1
assumed care of patient, patient awake, alert, and oriented x 3. NAD noted. respirations even and unlabored.

## 2023-05-22 NOTE — H&P ADULT - PROBLEM SELECTOR PLAN 1
Patient presenting with clinical symptoms of right-sided lower lumbar radiculopathy and pain with difficulty ambulating.  Patient will be a poor candidate for surgical intervention given advanced age.  Will try gabapentin for pain management.  Will give other options for pain management.  Physical therapy evaluation.  Further imaging as needed if patient not improved.  Pain management consult is not better.

## 2023-05-22 NOTE — ED PROVIDER NOTE - ATTENDING APP SHARED VISIT CONTRIBUTION OF CARE
89-year-old female past medical history CAD, A-fib, hyperlipidemia acute on chronic right lower back pain.  Patient uses walker at baseline denies any falls or recent trauma reports worsening of pain right lumbosacral region.  Denies any numbness or weakness.  Denies any dysuria hematuria or bowel incontinence or retention.  Denies fevers chills abdominal pain chest pain shortness of breath.  Was prescribed tramadol without significant change to pain.  Exam as above, gait not assessed secondary to pain.  Back pain.  Patient reporting difficulty ambulating at home secondary to pain.  Lives by herself.  Plan symptom relief, x-ray, labs, reassess.

## 2023-05-22 NOTE — H&P ADULT - ASSESSMENT
to be completed Patient is a 89yo Female with past medical history of  CAD A FIB HLD p/w a worsening of her chronic R sided back pain.   Pt denies recent falls or trauma, uses a walker, lives alone but states her daughter is close by.   Pt's PCP is Dr. Beulah Easley. Pt was given Tramadol by her home cares program but noted nausea and felt unwell taking this medication. Pt denies abdominal pain, dysuria, fever, chills or nightsweats. Pt denies any other sx or acute complaints.  patient states pain became worse and worse over the past 2 weeks until it became constant and that's why she came to ED .. patient has not seen pain management as outpatient >> discussed wiht patient re possible follow up for local injections for pain mgmt, patient amendable ..   patient states is able to walk with walker if pain controlled does not want to go to rehabilitation...

## 2023-05-22 NOTE — ED ADULT TRIAGE NOTE - CHIEF COMPLAINT QUOTE
back pain    pt has hx of chronic back pain.. states much worse today.,, right side of lower back rad to the leg and hip.  denies trauma, incontinence.  pmhx- [place], afib, hyperlipidemia, cad

## 2023-05-22 NOTE — ED PROVIDER NOTE - CONSTITUTIONAL, MLM
Uncomfortable appearing, awake, alert, oriented to person, place, time/situation and in no immediate distress. normal...

## 2023-05-22 NOTE — ED PROVIDER NOTE - NS ED ATTENDING STATEMENT MOD
This was a shared visit with the JUSTYN. I reviewed and verified the documentation and independently performed the documented:

## 2023-05-22 NOTE — H&P ADULT - HISTORY OF PRESENT ILLNESS
>>>>>>Medical Attending Initial / Admission note<<<<<<  -------------------------------------------------------------------------------  CHIEF COMPLAINT & HISTORY OF PRESENT ILLNESS:      Patient is a 89yo Female with past medical history of  CAD A FIB HLD p/w a worsening of her chronic R sided back pain.   Pt denies recent falls or trauma, uses a walker, lives alone but states her daughter is close by.   Pt's PCP is Dr. Beulah Easley. Pt was given Tramadol by her home cares program but noted nausea and felt unwell taking this medication. Pt denies abdominal pain, dysuria, fever, chills or nightsweats. Pt denies any other sx or acute complaints.  patient states pain became worse and worse over the past 2 weeks until it became constant and that's why she came to ED .. patient has not seen pain management as outpatient >> discussed wiht patient re possible follow up for local injections for pain mgmt, patient amendable ..   patient states is able to walk with walker if pain controlled does not want to go to rehabilitation...     --------------------------------------------------------------------------------  PAST MEDICAL / SURGICAL  HISTORY:    Hyperlipidemia, Acquired  Coronary artery disease  Atrial fibrillation, unspecified type    S/P Hysterectomy  History of Cardiac Cath  H/O: Hysterectomy  History of Cardiac Cath  H/O:  section x2  S/P CABG (coronary artery bypass graft)    --------------------------------------------------------------------------------  FAMILY HISTORY:    Family history of hypertension  Family history of coronary artery disease in son (Child)    --------------------------------------------------------------------------------  SOCIAL HISTORY:  Alcohol: None reported  Smoking: remote smoking history     --------------------------------------------------------------------------------  ALLERGIES:    [As libby, reviewed]    --------------------------------------------------------------------------------  MEDICATIONS:   [As libby, reviewed]    --------------------------------------------------------------------------------  REVIEW OF SYSTEM:    GEN: no fever, no chills, as above pain in rt hip area at times, with radiation down Rt leg   RESP: no SOB, no cough, no sputum  CVS: no chest pain, no palpitations, chronic bilateral leg edema on diuretics   GI: no abdominal pain, no nausea, no vomiting, no constipation, no diarrhea  : no dysuria, no frequency, no hematuria  Neuro: no headache, no dizziness  PSYCH: no anxiety, no depression  Derm : no itching, no rash    --------------------------------------------------------------------------------  VITAL SIGNS:    Vital Signs Last 24 HrsT(C): 36.5 (23 @ 08:01)  T(F): 97.7 (23 @ 08:01), Max: 97.8 (23 @ 04:40)  HR: 64 (23 08:01) (64 - 75)  BP: 111/50 (23 @ 08:01)  RR: 16 (23 08:01) (16 - 20)  SpO2: 100% (23 @ 08:01) (100% - 100%)      --------------------------------------------------------------------------------  PHYSICAL EXAM:    GEN: A&O X 3 , NAD , comfortable, pleasant, calm  HEENT: NCAT, PERRL, MMM, hearing intact  Neck: supple , no JVD  CVS: S1S2 , regular , No M/R/G appreciated  PULM: CTA B/L,  no W/R/R appreciated  ABD.: soft. non tender, non distended,  bowel sounds present  Extrem: intact pulses , trace b/l LE edema   Derm: No rash , no ecchymoses  PSYCH : normal mood,  no delusion not anxious    --------------------------------------------------------------------------------  LAB AND IMAGING:   [As libby, reviewed]    --------------------------------------------------------------------------------  ASSESSMENT AND PLAN:   [As bellow]    --------------------  Case discussed with patient, PMD aware  ___________________________  H. ELIANA Chung.  Pager: [phone]  23

## 2023-05-22 NOTE — PATIENT PROFILE ADULT - FALL HARM RISK - HARM RISK INTERVENTIONS

## 2023-05-22 NOTE — ED ADULT NURSE NOTE - OBJECTIVE STATEMENT
Pt is A&O x 4, maintained on bedrest, shows no signs of acute distress. Brought into the ED with lower back pain, worse on the right, that radiates to right thigh. Pt states she has a hx of chronic lower back pain but recently discomfort has intensified. Denies any recent trauma to affected area. Pt is ambulatory w/ a walker at baseline. Denies numbness/tingling, gi/gu discomfort, fever/chills, SOB or chest discomfort. VSS. Respirations even and unlabored. Given medications as per order. Pending XR results and reassessment.

## 2023-05-22 NOTE — ED PROVIDER NOTE - NEUROLOGICAL, MLM
Alert and oriented, no focal deficits, no motor or sensory deficits. 5/5 strength and intact sensation bilaterally in upper and lower extremity.

## 2023-05-22 NOTE — ED PROVIDER NOTE - OBJECTIVE STATEMENT
89 Y/O F PMH CAD A FIB HLD 89 Y/O F PMH CAD A FIB HLD p/w a worsening of her chronic R sided back pain. Pt denies recent falls or trauma, uses a walker, lives along but states her daughter is close by. Pt's PCP is Dr. Beulah Easley. Pt was given Tramadol by her home cares program but noted nausea and felt unwell taking this medication. Pt denies abdominal pan or 89 Y/O F PMH CAD A FIB HLD p/w a worsening of her chronic R sided back pain. Pt denies recent falls or trauma, uses a walker, lives along but states her daughter is close by. Pt's PCP is Dr. Beulah Easley. Pt was given Tramadol by her home cares program but noted nausea and felt unwell taking this medication. Pt denies abdominal pain, dysuria, fever, chills or nightsweats. Pt denies any other sx or acute complaints.

## 2023-05-22 NOTE — ED ADULT NURSE NOTE - CHIEF COMPLAINT QUOTE
no
back pain    pt has hx of chronic back pain.. states much worse today.,, right side of lower back rad to the leg and hip.  denies trauma, incontinence.  pmhx- [place], afib, hyperlipidemia, cad

## 2023-05-22 NOTE — ED ADULT NURSE NOTE - NSFALLHARMRISKINTERV_ED_ALL_ED

## 2023-05-23 NOTE — PROGRESS NOTE ADULT - ASSESSMENT
_________________________________________________________________________________________  ========>>  M E D I C A L   A T T E N D I N G    F O L L O W  U P  N O T E  <<=========  -----------------------------------------------------------------------------------------------------    - Patient seen and examined by me earlier today.   - In summary,  ZAYNAB EARLY is a 89y year old woman admitted with   - Patient today overall doing ok, comfortable, eating OK.     ==================>> REVIEW OF SYSTEM <<=================    GEN: no fever, no chills, no pain  RESP: no SOB, no cough, no sputum  CVS: no chest pain, no palpitations, no edema  GI: no abdominal pain, no nausea, no constipation, no diarrhea  : no dysuria, no frequency, no hematuria  Neuro: no headache, no dizziness  Derm : no itching, no rash    ==================>> PHYSICAL EXAM <<=================    GEN: A&O X 3 , NAD , comfortable, pleasant, calm   HEENT: NCAT, PERRL, MMM, hearing intact  Neck: supple , no JVD appreciated  CVS: S1S2 , regular , No M/R/G appreciated  PULM: CTA B/L,  no W/R/R appreciated  ABD.: soft. non tender, non distended,  bowel sounds present  Extrem: intact pulses , no edema   PSYCH : normal mood,  not anxious                            ( Note Written / Date of service :  05-23-23 )    ==================>> MEDICATIONS <<====================    MEDICATIONS  (STANDING):  apixaban 2.5 milliGRAM(s) Oral every 12 hours  chlorhexidine 2% Cloths 1 Application(s) Topical daily  gabapentin 100 milliGRAM(s) Oral two times a day  lactated ringers. 1000 milliLiter(s) (75 mL/Hr) IV Continuous <Continuous>  metoprolol succinate ER 25 milliGRAM(s) Oral daily  pantoprazole    Tablet 40 milliGRAM(s) Oral before breakfast  polyethylene glycol 3350 17 Gram(s) Oral daily  senna 2 Tablet(s) Oral at bedtime  torsemide 40 milliGRAM(s) Oral every 12 hours    MEDICATIONS  (PRN):  acetaminophen     Tablet .. 650 milliGRAM(s) Oral every 6 hours PRN Temp greater or equal to 38C (100.4F), Mild Pain (1 - 3)  aluminum hydroxide/magnesium hydroxide/simethicone Suspension 30 milliLiter(s) Oral every 4 hours PRN Dyspepsia  melatonin 3 milliGRAM(s) Oral at bedtime PRN Insomnia  ondansetron Injectable 4 milliGRAM(s) IV Push every 8 hours PRN Nausea and/or Vomiting  oxyCODONE    IR 5 milliGRAM(s) Oral every 6 hours PRN moderate to severe pain    ___________  Active diet:  Diet, Regular  ___________________    ==================>> VITAL SIGNS <<==================    T(C): 36.4 (05-23-23 @ 13:21), Max: 36.4 (05-23-23 @ 13:21)  HR: 64 (05-23-23 @ 13:21) (64 - 68)  BP: 108/55 (05-23-23 @ 13:21) (103/65 - 126/50)  BP(mean): --  RR: 18 (05-23-23 @ 06:14) (18 - 18)  SpO2: 100% (05-23-23 @ 13:21) (96% - 100%)     CAPILLARY BLOOD GLUCOSE        I&O's Summary       ==================>> LAB AND IMAGING <<==================                        11.6   4.01  )-----------( 263      ( 23 May 2023 06:17 )             37.9        05-23    138  |  93<L>  |  55<H>  ----------------------------<  92  3.4<L>   |  31  |  1.35<H>    Ca    10.3      23 May 2023 06:17  Phos  3.1     05-22  Mg     2.50     05-22    TPro  7.5  /  Alb  4.0  /  TBili  0.5  /  DBili  x   /  AST  29  /  ALT  11  /  AlkPhos  75  05-23                     TSH:      4.26   (05-22-23)           Lipid profile:  (05-23-23)     Total: 163     LDL  : (p)     HDL  :45     TG   :121     HgA1C:   (05-22-23)          (05-22-23)      5.6    ___________________________________________________________________________________  ===============>>  A S S E S S M E N T   A N D   P L A N <<===============  ------------------------------------------------------------------------------------------          -GI/DVT Prophylaxis per protocol.    --------------------------------------------  Case discussed with   Education given on findings and plan of care  ___________________________  H. ELIANA Chung.  Pager: [phone]       _________________________________________________________________________________________  ========>>  M E D I C A L   A T T E N D I N G    F O L L O W  U P  N O T E  <<=========  -----------------------------------------------------------------------------------------------------    - Patient seen and examined by me earlier today.   - In summary,  ZAYNAB EARLY is a 89y year old woman admitted with pain  - Patient today overall doing ok, comfortable, eating OK.     Patient did some walking with physical therapy today, however developed some lightheadedness and pain and had to sit down.  Patient tried the oxycodone last night and had a very good night sleep.  Patient was encouraged to try the pain medications for comfort and continue physical therapy as tolerated.    ==================>> REVIEW OF SYSTEM <<=================    GEN: no fever, no chills, no pain at rest   RESP: no SOB, no cough, no sputum  CVS: no chest pain, no palpitations, no edema  GI: no abdominal pain, no nausea, no constipation, no diarrhea  : no dysuria, no frequency, no hematuria  Neuro: no headache, no dizziness  Derm : no itching, no rash    ==================>> PHYSICAL EXAM <<=================    GEN: A&O X 3 , NAD , comfortable, pleasant, calm   HEENT: NCAT, PERRL, MMM, hearing intact  Neck: supple , no JVD appreciated  CVS: S1S2 , regular , No M/R/G appreciated  PULM: CTA B/L,  no W/R/R appreciated  ABD.: soft. non tender, non distended,  bowel sounds present  Extrem: intact pulses , no edema   PSYCH : normal mood,  not anxious                            ( Note Written / Date of service :  05-23-23 )    ==================>> MEDICATIONS <<====================    MEDICATIONS  (STANDING):  apixaban 2.5 milliGRAM(s) Oral every 12 hours  chlorhexidine 2% Cloths 1 Application(s) Topical daily  gabapentin 100 milliGRAM(s) Oral two times a day  lactated ringers. 1000 milliLiter(s) (75 mL/Hr) IV Continuous <Continuous>  metoprolol succinate ER 25 milliGRAM(s) Oral daily  pantoprazole    Tablet 40 milliGRAM(s) Oral before breakfast  polyethylene glycol 3350 17 Gram(s) Oral daily  senna 2 Tablet(s) Oral at bedtime  torsemide 40 milliGRAM(s) Oral every 12 hours    MEDICATIONS  (PRN):  acetaminophen     Tablet .. 650 milliGRAM(s) Oral every 6 hours PRN Temp greater or equal to 38C (100.4F), Mild Pain (1 - 3)  aluminum hydroxide/magnesium hydroxide/simethicone Suspension 30 milliLiter(s) Oral every 4 hours PRN Dyspepsia  melatonin 3 milliGRAM(s) Oral at bedtime PRN Insomnia  ondansetron Injectable 4 milliGRAM(s) IV Push every 8 hours PRN Nausea and/or Vomiting  oxyCODONE    IR 5 milliGRAM(s) Oral every 6 hours PRN moderate to severe pain    ___________  Active diet:  Diet, Regular  ___________________    ==================>> VITAL SIGNS <<==================    T(C): 36.4 (05-23-23 @ 13:21), Max: 36.4 (05-23-23 @ 13:21)  HR: 64 (05-23-23 @ 13:21) (64 - 68)  BP: 108/55 (05-23-23 @ 13:21) (103/65 - 126/50)  RR: 18 (05-23-23 @ 06:14) (18 - 18)  SpO2: 100% (05-23-23 @ 13:21) (96% - 100%)      ==================>> LAB AND IMAGING <<==================                        11.6   4.01  )-----------( 263      ( 23 May 2023 06:17 )             37.9        05-23    138  |  93<L>  |  55<H>  ----------------------------<  92  3.4<L>   |  31  |  1.35<H>    Ca    10.3      23 May 2023 06:17  Phos  3.1     05-22  Mg     2.50     05-22    TPro  7.5  /  Alb  4.0  /  TBili  0.5  /  DBili  x   /  AST  29  /  ALT  11  /  AlkPhos  75  05-23                 TSH:      4.26   (05-22-23)           Lipid profile:  (05-23-23)     Total: 163     LDL  : (p)     HDL  :45     TG   :121     HgA1C:   (05-22-23)          (05-22-23)      5.6    ___________________________________________________________________________________  ===============>>  A S S E S S M E N T   A N D   P L A N <<===============  ------------------------------------------------------------------------------------------    · Assessment  Patient is a 89yo Female with past medical history of  CAD A FIB HLD p/w a worsening of her chronic R sided back pain.   Pt denies recent falls or trauma, uses a walker, lives alone but states her daughter is close by.   Pt's PCP is Dr. Beulah Easley. Pt was given Tramadol by her home cares program but noted nausea and felt unwell taking this medication. Pt denies abdominal pain, dysuria, fever, chills or nightsweats. Pt denies any other sx or acute complaints.  patient states pain became worse and worse over the past 2 weeks until it became constant and that's why she came to ED .. patient has not seen pain management as outpatient >> discussed wiht patient re possible follow up for local injections for pain mgmt, patient amendable ..       Problem/Plan - 1:  ·  Problem: Right lumbar radiculopathy.   ·  Plan: Patient presenting with clinical symptoms of right-sided lower lumbar radiculopathy and pain with difficulty ambulating.  Patient will be a poor candidate for surgical intervention given advanced age.  trying gabapentin and Oxy for pain management.  Physical therapy evaluation appreciated : Recommendation for rehabilitation, however patient is hesitant to go to rehab  Further imaging as needed if patient not improved.  Pain management consult is not better.  As discussed with patient, patient would benefit from pain management consultation as outpatient for possible local injections (not available in the hospital)    Problem/Plan - 2:  ·  Problem: ART (acute kidney injury).   ·  Plan: Patients creatinine elevated compared to prior readings   Unclear if patients baseline has changed.  Will give short course of IV hydration and revalue it. >>> Improved    Problem/Plan - 3:  ·  Problem: CAD (coronary artery disease).   ·  Plan: Continue Current medications otherwise and monitor.  cardo to follow.    Problem/Plan - 4:  ·  Problem: Chronic atrial fibrillation.   ·  Plan: Continue home medications and monitor.    Encourages out of bed to chair As tolerated    --------------------------------------------  Case discussed with patient, RN, med team  Education given on findings and plan of care  ___________________________  H. ELIANA Chung.  Pager: [phone]

## 2023-05-23 NOTE — PHYSICAL THERAPY INITIAL EVALUATION ADULT - GENERAL OBSERVATIONS, REHAB EVAL
Pt received semi-supine in bed, +IV, all lines intact, in NAD. Pt agreeable to participate in PT evaluation.

## 2023-05-23 NOTE — PHYSICAL THERAPY INITIAL EVALUATION ADULT - GAIT DISTANCE, PT EVAL
Pt ambulated 25 feet with mostly standby-assist but experienced 2 episodes of loss of balance requiring minimal assist x 1 to recover and prevent fall, finished remainder of gait assessment with +chair follow/25 feet

## 2023-05-23 NOTE — PROGRESS NOTE ADULT - CONVERSATION DETAILS
*** Advance directive /  goals of care discussion      I had a long discussion with patient about patient's overall diagnosis, expected prognosis, and potential complications.       Discussed treatment options, comfort care / hospice as appropriate, and all other potential options of care.         Discussed risks, benefits, and alternatives of treatment as well.          Opportunity given for and all questions answered.            Reviewed available advance directives as available > None available     At this time patient to be > full code, with continuation of current medical therapy.     Goal is for pt to return > home and follow up as outpatient with current doctors      will continue to discuss GOC with pt and family and update plan as needed.     Patient's family have my contact information and will contact me with questions.     Additional time spent on Goals of care: 22 min.

## 2023-05-23 NOTE — PHYSICAL THERAPY INITIAL EVALUATION ADULT - PERTINENT HX OF CURRENT PROBLEM, REHAB EVAL
89 year old Female, history stated below, presents with a worsening of her chronic Right sided back pain

## 2023-05-23 NOTE — PHYSICAL THERAPY INITIAL EVALUATION ADULT - ADDITIONAL COMMENTS
Pt lives in a private house alone, +10 steps to enter, +1 flight inside to negotiate. Pt was independent with all functional mobility originally with a Single Vesta Cane but reports recently transitioning to a rolling walker secondary to weakness/pain.     Pt left seated in chair at bedside, all lines intact, all needs in reach, bed alarm set, in NAD. YAMEL mujica.

## 2023-05-24 NOTE — PROGRESS NOTE ADULT - SUBJECTIVE AND OBJECTIVE BOX
Cardiovascular Disease Progress Note    Overnight events: No acute events overnight.  back pain improving. no new cardiac sx  Otherwise review of systems negative    Objective Findings:  T(C): 36.6 (05-24-23 @ 05:22), Max: 37 (05-23-23 @ 22:50)  HR: 61 (05-24-23 @ 05:22) (61 - 68)  BP: 117/50 (05-24-23 @ 05:22) (108/55 - 117/50)  RR: 17 (05-24-23 @ 05:22) (17 - 17)  SpO2: 96% (05-24-23 @ 05:22) (96% - 100%)  Wt(kg): --  Daily     Daily       Physical Exam:  Gen: NAD  HEENT: EOMI  CV: RRR, normal S1 + S2, no m/r/g  Lungs: CTAB  Abd: soft, non-tender  Ext: No edema    Telemetry:    Laboratory Data:                        10.8   4.30  )-----------( 246      ( 24 May 2023 05:20 )             34.5     05-24    135  |  92<L>  |  55<H>  ----------------------------<  95  3.2<L>   |  29  |  1.27    Ca    9.8      24 May 2023 05:20  Phos  3.1     05-22  Mg     2.50     05-22    TPro  7.5  /  Alb  4.0  /  TBili  0.5  /  DBili  x   /  AST  29  /  ALT  11  /  AlkPhos  75  05-23              Inpatient Medications:  MEDICATIONS  (STANDING):  apixaban 2.5 milliGRAM(s) Oral every 12 hours  chlorhexidine 2% Cloths 1 Application(s) Topical daily  gabapentin 100 milliGRAM(s) Oral two times a day  lactated ringers. 1000 milliLiter(s) (75 mL/Hr) IV Continuous <Continuous>  metoprolol succinate ER 25 milliGRAM(s) Oral daily  pantoprazole    Tablet 40 milliGRAM(s) Oral before breakfast  polyethylene glycol 3350 17 Gram(s) Oral daily  senna 2 Tablet(s) Oral at bedtime  torsemide 40 milliGRAM(s) Oral every 12 hours      Assessment:  89yo Female with past medical history of  CAD A FIB HLD p/w a worsening of her chronic R sided back pain.     Recs:  cardiac stable  denies any ischemic or hf sx  cw eliquis and rate control meds for af  suggest statin given hx of cad if not otherwise contraindicated  diuretics as dosed, appears euvolemic. b/l cr is 1.3-1.5  pain control  PT eval      Over 25 minutes spent on total encounter; more than 50% of the visit was spent counseling and/or coordinating care by the attending physician.      Yung Ballard MD   Cardiovascular Disease  [phone]

## 2023-05-24 NOTE — DIETITIAN INITIAL EVALUATION ADULT - PERTINENT LABORATORY DATA
05-24 Na 135 mmol/L Glu 95 mg/dL K+ 3.2 mmol/L<L> Cr 1.27 mg/dL BUN 55 mg/dL<H> Phos n/a      A1C with Estimated Average Glucose Result: 5.6 % (05-22-23 @ 14:17)

## 2023-05-24 NOTE — DIETITIAN INITIAL EVALUATION ADULT - OTHER INFO
Medical course: Per chart 89 year old Female with past medical history of  CAD A FIB HLD p/w a worsening of her chronic R sided back pain.     Nutrition interview: Pt A&Ox4. No recent episodes of nausea, vomiting, or diarrhea. Pt has constipation, was provided with bowel regimen (senna, miralax). Pt states that she can't eat cheese because it makes her constipated. However, reports that milk doesn't affect her because she only uses a little in her coffee. Last BM noted on 5/23 per RN flowsheets. Pt with dry lips, encouraged PO fluid intake to prevent dehydration intake and help with constipation. Pt declined prune juice. Also noted Pt on pain medication which can cause constipation. Denies any chewing/swallowing difficulties. No known food allergies, diet office updated on preference for no cheese. Stated UBW: 150#, reports weight is usually stable except for fluid shifts when she sometimes has swollen legs, currently receiving diuretic. Food preferences explored and noted. Intake is 50-75% per RN flowsheets. Pt states that she was not eating well at first to avoid having to use the bathroom 2/2 back pain. Pain has gotten better and intake is increasing. Feeding skills: tray set up.

## 2023-05-24 NOTE — PROGRESS NOTE ADULT - ASSESSMENT
_________________________________________________________________________________________  ========>>  M E D I C A L   A T T E N D I N G    F O L L O W  U P  N O T E  <<=========  -----------------------------------------------------------------------------------------------------    - Patient seen and examined by me earlier today.   - In summary,  ZAYNAB EARLY is a 89y year old woman admitted with pain  - Patient today overall doing ok, comfortable, eating OK.     Patient walking back ad forth to bathroom with assistance but starts to have pain quickly ...       taking medications as needed, + complains of chronic constipation ( getting regimen)     ==================>> REVIEW OF SYSTEM <<=================    GEN: no fever, no chills, as above   RESP: no SOB, no cough, no sputum  CVS: no chest pain, no palpitations, no edema  GI: no abdominal pain, no nausea,  +constipation,   : no dysuria, no frequency,   Neuro: no headache, no dizziness  Derm : no itching, no rash    ==================>> PHYSICAL EXAM <<=================    GEN: A&O X 3 , NAD , comfortable, pleasant, calm in chair   HEENT: NCAT, PERRL, MMM, hearing intact  Neck: supple , no JVD appreciated  CVS: S1S2 , regular , No M/R/G appreciated  PULM: CTA B/L,  no W/R/R appreciated  ABD.: soft. non tender, non distended,  bowel sounds present  Extrem: intact pulses , no edema   PSYCH : normal mood,  not anxious                             ( Note written / Date of service 05-24-23 )    ==================>> MEDICATIONS <<====================    acetaminophen     Tablet .. 650 milliGRAM(s) Oral every 6 hours  apixaban 2.5 milliGRAM(s) Oral every 12 hours  chlorhexidine 2% Cloths 1 Application(s) Topical daily  gabapentin 100 milliGRAM(s) Oral two times a day  lactated ringers. 1000 milliLiter(s) IV Continuous <Continuous>  lidocaine   4% Patch 1 Patch Transdermal daily  metoprolol succinate ER 25 milliGRAM(s) Oral daily  pantoprazole    Tablet 40 milliGRAM(s) Oral before breakfast  polyethylene glycol 3350 17 Gram(s) Oral daily  potassium chloride    Tablet ER 40 milliEquivalent(s) Oral once  senna 2 Tablet(s) Oral at bedtime  torsemide 40 milliGRAM(s) Oral every 12 hours    MEDICATIONS  (PRN):  aluminum hydroxide/magnesium hydroxide/simethicone Suspension 30 milliLiter(s) Oral every 4 hours PRN Dyspepsia  melatonin 3 milliGRAM(s) Oral at bedtime PRN Insomnia  ondansetron Injectable 4 milliGRAM(s) IV Push every 8 hours PRN Nausea and/or Vomiting  oxyCODONE    IR 5 milliGRAM(s) Oral every 6 hours PRN moderate to severe pain    ___________  Active diet:  Diet, Regular  ___________________    ==================>> VITAL SIGNS <<==================  Height (cm): 152.4  Weight (kg): 68.6  BMI (kg/m2): 29.5  Vital Signs Last 24 HrsT(C): 37.1 (05-24-23 @ 13:11)  T(F): 98.8 (05-24-23 @ 13:11), Max: 98.8 (05-24-23 @ 13:11)  HR: 63 (05-24-23 @ 13:11) (61 - 63)  BP: 109/52 (05-24-23 @ 13:11)  RR: 17 (05-24-23 @ 05:22) (17 - 17)  SpO2: 98% (05-24-23 @ 13:11) (96% - 98%)       ==================>> LAB AND IMAGING <<==================                        10.8   4.30  )-----------( 246      ( 24 May 2023 05:20 )             34.5        05-24    135  |  92<L>  |  55<H>  ----------------------------<  95  3.2<L>   |  29  |  1.27    Ca    9.8      24 May 2023 05:20    TPro  7.5  /  Alb  4.0  /  TBili  0.5  /  DBili  x   /  AST  29  /  ALT  11  /  AlkPhos  75  05-23    WBC count:   4.30 <<== ,  4.01 <<== ,  4.32 <<==   Hemoglobin:   10.8 <<==,  11.6 <<==,  11.5 <<==  platelets:  246 <==, 263 <==, 281 <==    Creatinine:  1.27  <<==, 1.35  <<==, 1.69  <<==  Sodium:   135  <==, 138  <==, 135  <==       AST:          29 <== , 30 <==      ALT:        11  <== , 11  <==      AP:        75  <=, 80  <=     Bili:        0.5  <=, 0.5  <=      ___________________________________________________________________________________  ===============>>  A S S E S S M E N T   A N D   P L A N <<===============  ------------------------------------------------------------------------------------------    · Assessment  Patient is a 89yo Female with past medical history of  CAD A FIB HLD p/w a worsening of her chronic R sided back pain.   Pt denies recent falls or trauma, uses a walker, lives alone but states her daughter is close by.   Pt's PCP is Dr. Beulah Easley. Pt was given Tramadol by her home cares program but noted nausea and felt unwell taking this medication. Pt denies abdominal pain, dysuria, fever, chills or nightsweats. Pt denies any other sx or acute complaints.  patient states pain became worse and worse over the past 2 weeks until it became constant and that's why she came to ED .. patient has not seen pain management as outpatient >> discussed wiht patient re possible follow up for local injections for pain mgmt, patient amendable ..       Problem/Plan - 1:  ·  Problem: Right lumbar radiculopathy.   ·  Plan: Patient presenting with clinical symptoms of right-sided lower lumbar radiculopathy and pain with difficulty ambulating.  Patient will be a poor candidate for surgical intervention given advanced age.  trying gabapentin and Oxy for pain management.  Physical therapy evaluation appreciated : Recommendation for rehabilitation, however patient DOES NOT WANT to go to rehab  patient  in agreement with Further imaging now>> ordered MRI Lumbar spine   Pain management consult as needed   (As discussed with patient, patient likely would benefit from pain management consultation as outpatient for possible local injections (not available in the hospital))    Problem/Plan - 2:  ·  Problem: ART (acute kidney injury).   likely pre-renal as patient improved with hydration   monitor  Avoid nephrotoxic medications.    Problem/Plan - 3:  ·  Problem: CAD (coronary artery disease).   ·  Plan: Continue Current medications otherwise and monitor.  cardio appreciated     Problem/Plan - 4:  ·  Problem: Chronic atrial fibrillation.   ·  Plan: Continue home medications and monitor.    Encourages out of bed to chair As tolerated    --------------------------------------------  Case discussed with patient, RN, med team  Education given on findings and plan of care  ___________________________  H. ELIANA Chung.  Pager: [phone]

## 2023-05-24 NOTE — DIETITIAN INITIAL EVALUATION ADULT - ORAL INTAKE PTA/DIET HISTORY
Pt lives at alone and has daughter that lives nearby to help with cooking and grocery shopping. No specific diet followed PTA, non-specific diet recall of meat mostly chicken, pasta, green vegetables, lettuce/tomatoes. No supplements/Vitamins taken PTA.

## 2023-05-24 NOTE — DIETITIAN INITIAL EVALUATION ADULT - PROBLEM SELECTOR PLAN 2
Patients creatinine elevated compared to prior readings   Unclear if patients baseline has changed.  Will give short course of IV hydration and revalue it.  If not better imaging of the kidneys.

## 2023-05-24 NOTE — DIETITIAN INITIAL EVALUATION ADULT - PERTINENT MEDS FT
MEDICATIONS  (STANDING):  apixaban 2.5 milliGRAM(s) Oral every 12 hours  chlorhexidine 2% Cloths 1 Application(s) Topical daily  gabapentin 100 milliGRAM(s) Oral two times a day  lactated ringers. 1000 milliLiter(s) (75 mL/Hr) IV Continuous <Continuous>  metoprolol succinate ER 25 milliGRAM(s) Oral daily  pantoprazole    Tablet 40 milliGRAM(s) Oral before breakfast  polyethylene glycol 3350 17 Gram(s) Oral daily  senna 2 Tablet(s) Oral at bedtime  torsemide 40 milliGRAM(s) Oral every 12 hours    MEDICATIONS  (PRN):  acetaminophen     Tablet .. 650 milliGRAM(s) Oral every 6 hours PRN Temp greater or equal to 38C (100.4F), Mild Pain (1 - 3)  aluminum hydroxide/magnesium hydroxide/simethicone Suspension 30 milliLiter(s) Oral every 4 hours PRN Dyspepsia  melatonin 3 milliGRAM(s) Oral at bedtime PRN Insomnia  ondansetron Injectable 4 milliGRAM(s) IV Push every 8 hours PRN Nausea and/or Vomiting  oxyCODONE    IR 5 milliGRAM(s) Oral every 6 hours PRN moderate to severe pain

## 2023-05-24 NOTE — DIETITIAN INITIAL EVALUATION ADULT - ADD RECOMMEND
1) Recommend continue regular diet   2) Monitor weights, labs, BM's, skin integrity, p.o. intake.   3) Encourage PO intake and honor food preferences as able.   4) Obtain weekly weights

## 2023-05-25 NOTE — PROGRESS NOTE ADULT - SUBJECTIVE AND OBJECTIVE BOX
Cardiovascular Disease Progress Note    Overnight events: No acute events overnight. no new cardiac sx   Otherwise review of systems negative    Objective Findings:  T(C): 36.8 (05-25-23 @ 05:00), Max: 37.1 (05-24-23 @ 13:11)  HR: 66 (05-25-23 @ 05:00) (63 - 72)  BP: 120/60 (05-25-23 @ 05:00) (101/62 - 120/60)  RR: 18 (05-25-23 @ 05:00) (18 - 18)  SpO2: 98% (05-25-23 @ 05:00) (97% - 98%)  Wt(kg): --  Daily     Daily       Physical Exam:  Gen: NAD  HEENT: EOMI  CV: RRR, normal S1 + S2, no m/r/g  Lungs: CTAB  Abd: soft, non-tender  Ext: No edema    Telemetry:    Laboratory Data:                        11.3   4.63  )-----------( 238      ( 25 May 2023 05:30 )             36.7     05-25    135  |  94<L>  |  58<H>  ----------------------------<  102<H>  3.7   |  30  |  1.33<H>    Ca    9.9      25 May 2023 05:30  Phos  2.9     05-25  Mg     2.10     05-25    TPro  7.3  /  Alb  4.0  /  TBili  0.4  /  DBili  x   /  AST  26  /  ALT  9   /  AlkPhos  75  05-25              Inpatient Medications:  MEDICATIONS  (STANDING):  acetaminophen     Tablet .. 650 milliGRAM(s) Oral every 6 hours  apixaban 2.5 milliGRAM(s) Oral every 12 hours  chlorhexidine 2% Cloths 1 Application(s) Topical daily  gabapentin 100 milliGRAM(s) Oral two times a day  lactated ringers. 1000 milliLiter(s) (75 mL/Hr) IV Continuous <Continuous>  lidocaine   4% Patch 1 Patch Transdermal daily  metoprolol succinate ER 25 milliGRAM(s) Oral daily  pantoprazole    Tablet 40 milliGRAM(s) Oral before breakfast  polyethylene glycol 3350 17 Gram(s) Oral daily  senna 2 Tablet(s) Oral at bedtime  torsemide 40 milliGRAM(s) Oral every 12 hours      Assessment:  89yo Female with past medical history of  CAD A FIB HLD p/w a worsening of her chronic R sided back pain.     Recs:  cardiac stable  denies any ischemic or hf sx  cw eliquis and rate control meds for af  suggest statin given hx of cad if not otherwise contraindicated  diuretics as dosed, appears euvolemic. b/l cr is 1.3-1.5  pain control  PT eval  f.u mri      Over 25 minutes spent on total encounter; more than 50% of the visit was spent counseling and/or coordinating care by the attending physician.      Yung Ballard MD   Cardiovascular Disease  [phone]

## 2023-05-25 NOTE — PROGRESS NOTE ADULT - ASSESSMENT
_________________________________________________________________________________________  ========>>  M E D I C A L   A T T E N D I N G    F O L L O W  U P  N O T E  <<=========  -----------------------------------------------------------------------------------------------------    - Patient seen and examined by me earlier today.   - In summary,  ZAYNAB EARLY is a 89y year old woman admitted with pain  - Patient today overall doing ok, comfortable, eating OK.     Patient walking back ad forth to bathroom with assistance but starts to have pain quickly ...       taking medications as needed, + complains of chronic constipation ( getting regimen)     ==================>> REVIEW OF SYSTEM <<=================    GEN: no fever, no chills, as above   RESP: no SOB, no cough, no sputum  CVS: no chest pain, no palpitations, no edema  GI: no abdominal pain, no nausea,  +constipation,   : no dysuria, no frequency,   Neuro: no headache, no dizziness  Derm : no itching, no rash    ==================>> PHYSICAL EXAM <<=================    GEN: A&O X 3 , NAD , comfortable, pleasant, calm in chair   HEENT: NCAT, PERRL, MMM, hearing intact  Neck: supple , no JVD appreciated  CVS: S1S2 , regular , No M/R/G appreciated  PULM: CTA B/L,  no W/R/R appreciated  ABD.: soft. non tender, non distended,  bowel sounds present  Extrem: intact pulses , no edema   PSYCH : normal mood,  not anxious                               ( Note written / Date of service 05-25-23 )    ==================>> MEDICATIONS <<====================    acetaminophen     Tablet .. 650 milliGRAM(s) Oral every 6 hours  apixaban 2.5 milliGRAM(s) Oral every 12 hours  chlorhexidine 2% Cloths 1 Application(s) Topical daily  gabapentin 100 milliGRAM(s) Oral two times a day  lactated ringers. 1000 milliLiter(s) IV Continuous <Continuous>  lidocaine   4% Patch 1 Patch Transdermal daily  metoprolol succinate ER 25 milliGRAM(s) Oral daily  pantoprazole    Tablet 40 milliGRAM(s) Oral before breakfast  polyethylene glycol 3350 17 Gram(s) Oral daily  senna 2 Tablet(s) Oral at bedtime  torsemide 40 milliGRAM(s) Oral every 12 hours    MEDICATIONS  (PRN):  aluminum hydroxide/magnesium hydroxide/simethicone Suspension 30 milliLiter(s) Oral every 4 hours PRN Dyspepsia  melatonin 3 milliGRAM(s) Oral at bedtime PRN Insomnia  ondansetron Injectable 4 milliGRAM(s) IV Push every 8 hours PRN Nausea and/or Vomiting  oxyCODONE    IR 5 milliGRAM(s) Oral every 6 hours PRN moderate to severe pain    ___________  Active diet:  Diet, Regular  ___________________    ==================>> VITAL SIGNS <<==================  Height (cm): 152.4  Weight (kg): 68.6  BMI (kg/m2): 29.5  Vital Signs Last 24 HrsT(C): 36.9 (05-25-23 @ 13:00)  T(F): 98.4 (05-25-23 @ 13:00), Max: 98.4 (05-25-23 @ 13:00)  HR: 70 (05-25-23 @ 13:00) (66 - 72)  BP: 118/64 (05-25-23 @ 13:00)  RR: 18 (05-25-23 @ 13:00) (18 - 18)  SpO2: 98% (05-25-23 @ 13:00) (97% - 98%)      CAPILLARY BLOOD GLUCOSE         ==================>> LAB AND IMAGING <<==================                        11.3   4.63  )-----------( 238      ( 25 May 2023 05:30 )             36.7        05-25    135  |  94<L>  |  58<H>  ----------------------------<  102<H>  3.7   |  30  |  1.33<H>    Ca    9.9      25 May 2023 05:30  Phos  2.9     05-25  Mg     2.10     05-25    TPro  7.3  /  Alb  4.0  /  TBili  0.4  /  DBili  x   /  AST  26  /  ALT  9   /  AlkPhos  75  05-25    WBC count:   4.63 <<== ,  4.30 <<== ,  4.01 <<== ,  4.32 <<==   Hemoglobin:   11.3 <<==,  10.8 <<==,  11.6 <<==,  11.5 <<==  platelets:  238 <==, 246 <==, 263 <==, 281 <==    Creatinine:  1.33  <<==, 1.27  <<==, 1.35  <<==, 1.69  <<==  Sodium:   135  <==, 135  <==, 138  <==, 135  <==       AST:          26 <== , 29 <== , 30 <==      ALT:        9  <== , 11  <== , 11  <==      AP:        75  <=, 75  <=, 80  <=     Bili:        0.4  <=, 0.5  <=, 0.5  <=    ____________________________    M I C R O B I O L O G Y :        ___________________________________________________________________________________  ===============>>  A S S E S S M E N T   A N D   P L A N <<===============  ------------------------------------------------------------------------------------------    · Assessment  Patient is a 89yo Female with past medical history of  CAD A FIB HLD p/w a worsening of her chronic R sided back pain.   Pt denies recent falls or trauma, uses a walker, lives alone but states her daughter is close by.   Pt's PCP is Dr. Beulah Easley. Pt was given Tramadol by her home cares program but noted nausea and felt unwell taking this medication. Pt denies abdominal pain, dysuria, fever, chills or nightsweats. Pt denies any other sx or acute complaints.  patient states pain became worse and worse over the past 2 weeks until it became constant and that's why she came to ED .. patient has not seen pain management as outpatient >> discussed wiht patient re possible follow up for local injections for pain mgmt, patient amendable ..       Problem/Plan - 1:  ·  Problem: Right lumbar radiculopathy.   ·  Plan: Patient presenting with clinical symptoms of right-sided lower lumbar radiculopathy and pain with difficulty ambulating.  Patient will be a poor candidate for surgical intervention given advanced age.  trying gabapentin and Oxy for pain management.  Physical therapy evaluation appreciated : Recommendation for rehabilitation, however patient DOES NOT WANT to go to rehab  patient  in agreement with Further imaging now>> ordered MRI Lumbar spine   Pain management consult as needed   (As discussed with patient, patient likely would benefit from pain management consultation as outpatient for possible local injections (not available in the hospital))    Problem/Plan - 2:  ·  Problem: ART (acute kidney injury).   likely pre-renal as patient improved with hydration   monitor  Avoid nephrotoxic medications.    Problem/Plan - 3:  ·  Problem: CAD (coronary artery disease).   ·  Plan: Continue Current medications otherwise and monitor.  cardio appreciated     Problem/Plan - 4:  ·  Problem: Chronic atrial fibrillation.   ·  Plan: Continue home medications and monitor.    Encourages out of bed to chair As tolerated    --------------------------------------------  Case discussed with patient, RN, med team  Education given on findings and plan of care  ___________________________  HEdison Chung D.O.  Pager: [phone]       _________________________________________________________________________________________  ========>>  M E D I C A L   A T T E N D I N G    F O L L O W  U P  N O T E  <<=========  -----------------------------------------------------------------------------------------------------    - Patient evaluated by me, chart reviewed.   - In summary,  ZAYNAB EARLY is a 89y year old woman admitted with pain  - Patient today overall doing ok, comfortable, eating OK.      Patient wanted MRI department for MRI of the lower back, however it was canceled by the department due to a question about the patients open-heart surgery in the 1980s.     Try to obtain information about the patient's surgery however was unable to obtain any formation.    ==================>> REVIEW OF SYSTEM <<=================    GEN: no fever, no chills, as above   RESP: no SOB, no cough, no sputum  CVS: no chest pain, no palpitations, no edema  GI: no abdominal pain, no nausea,  Chronic constipation,   : no dysuria, no frequency,   Neuro: no headache, no dizziness  Derm : no itching, no rash    ==================>> PHYSICAL EXAM <<=================    GEN: A&O X 3 , NAD , comfortable, pleasant, calm in chair   HEENT: NCAT, PERRL, MMM, hearing intact  Neck: supple , no JVD appreciated  CVS: S1S2 , regular , No M/R/G appreciated  PULM: CTA B/L,  no W/R/R appreciated  ABD.: soft. non tender, non distended,  bowel sounds present  Extrem: intact pulses , no edema   PSYCH : normal mood,  not anxious                               ( Note written / Date of service 05-25-23 )    ==================>> MEDICATIONS <<====================    acetaminophen     Tablet .. 650 milliGRAM(s) Oral every 6 hours  apixaban 2.5 milliGRAM(s) Oral every 12 hours  chlorhexidine 2% Cloths 1 Application(s) Topical daily  gabapentin 100 milliGRAM(s) Oral two times a day  lactated ringers. 1000 milliLiter(s) IV Continuous <Continuous>  lidocaine   4% Patch 1 Patch Transdermal daily  metoprolol succinate ER 25 milliGRAM(s) Oral daily  pantoprazole    Tablet 40 milliGRAM(s) Oral before breakfast  polyethylene glycol 3350 17 Gram(s) Oral daily  senna 2 Tablet(s) Oral at bedtime  torsemide 40 milliGRAM(s) Oral every 12 hours    MEDICATIONS  (PRN):  aluminum hydroxide/magnesium hydroxide/simethicone Suspension 30 milliLiter(s) Oral every 4 hours PRN Dyspepsia  melatonin 3 milliGRAM(s) Oral at bedtime PRN Insomnia  ondansetron Injectable 4 milliGRAM(s) IV Push every 8 hours PRN Nausea and/or Vomiting  oxyCODONE    IR 5 milliGRAM(s) Oral every 6 hours PRN moderate to severe pain    ___________  Active diet:  Diet, Regular  ___________________    ==================>> VITAL SIGNS <<==================  Height (cm): 152.4  Weight (kg): 68.6  BMI (kg/m2): 29.5  Vital Signs Last 24 HrsT(C): 36.9 (05-25-23 @ 13:00)  T(F): 98.4 (05-25-23 @ 13:00), Max: 98.4 (05-25-23 @ 13:00)  HR: 70 (05-25-23 @ 13:00) (66 - 72)  BP: 118/64 (05-25-23 @ 13:00)  RR: 18 (05-25-23 @ 13:00) (18 - 18)  SpO2: 98% (05-25-23 @ 13:00) (97% - 98%)           ==================>> LAB AND IMAGING <<==================                        11.3   4.63  )-----------( 238      ( 25 May 2023 05:30 )             36.7        05-25    135  |  94<L>  |  58<H>  ----------------------------<  102<H>  3.7   |  30  |  1.33<H>    Ca    9.9      25 May 2023 05:30  Phos  2.9     05-25  Mg     2.10     05-25    TPro  7.3  /  Alb  4.0  /  TBili  0.4  /  DBili  x   /  AST  26  /  ALT  9   /  AlkPhos  75  05-25    WBC count:   4.63 <<== ,  4.30 <<== ,  4.01 <<== ,  4.32 <<==   Hemoglobin:   11.3 <<==,  10.8 <<==,  11.6 <<==,  11.5 <<==  platelets:  238 <==, 246 <==, 263 <==, 281 <==    Creatinine:  1.33  <<==, 1.27  <<==, 1.35  <<==, 1.69  <<==  Sodium:   135  <==, 135  <==, 138  <==, 135  <==       AST:          26 <== , 29 <== , 30 <==      ALT:        9  <== , 11  <== , 11  <==      AP:        75  <=, 75  <=, 80  <=     Bili:        0.4  <=, 0.5  <=, 0.5  <=    Awaiting MRI    ___________________________________________________________________________________  ===============>>  A S S E S S M E N T   A N D   P L A N <<===============  ------------------------------------------------------------------------------------------    · Assessment  Patient is a 89yo Female with past medical history of  CAD A FIB HLD p/w a worsening of her chronic R sided back pain.   Pt denies recent falls or trauma, uses a walker, lives alone but states her daughter is close by.   Pt's PCP is Dr. Beulah Easley. Pt was given Tramadol by her home cares program but noted nausea and felt unwell taking this medication. Pt denies abdominal pain, dysuria, fever, chills or nightsweats. Pt denies any other sx or acute complaints.  patient states pain became worse and worse over the past 2 weeks until it became constant and that's why she came to ED .. patient has not seen pain management as outpatient >> discussed wiht patient re possible follow up for local injections for pain mgmt, patient amendable ..       Problem/Plan - 1:  ·  Problem: Right lumbar radiculopathy.   ·  Plan: Patient presenting with clinical symptoms of right-sided lower lumbar radiculopathy and pain with difficulty ambulating.  Patient will be a poor candidate for surgical intervention given advanced age.  trying gabapentin and Oxy for pain management.  Physical therapy evaluation appreciated : Recommendation for rehabilitation, however patient DOES NOT WANT to go to rehab  patient  in agreement with Further imaging now>> ordered MRI Lumbar spine   Pain management consult as needed   (As discussed with patient, patient likely would benefit from pain management consultation as outpatient for possible local injections (not available in the hospital))    Problem/Plan - 2:  ·  Problem: ART (acute kidney injury).   likely pre-renal as patient improved with hydration   monitor  Avoid nephrotoxic medications.    Problem/Plan - 3:  ·  Problem: CAD (coronary artery disease).   ·  Plan: Continue Current medications otherwise and monitor.  cardio appreciated     Problem/Plan - 4:  ·  Problem: Chronic atrial fibrillation.   ·  Plan: Continue home medications and monitor.    Encourages out of bed to chair As tolerated    --------------------------------------------  Case discussed with patient, RN, med team  Education given on findings and plan of care  ___________________________  H. ELIANA Chung.  Pager: [phone]    Dr ARMAND Salinas will cover me Fri and sat      .

## 2023-05-26 NOTE — PROGRESS NOTE ADULT - ASSESSMENT
A/P    Patient is a 89yo Female with past medical history of  CAD A FIB HLD p/w a worsening of her chronic R sided back pain.   Pt denies recent falls or trauma, uses a walker, lives alone but states her daughter is close by.   Pt's PCP is Dr. Beulah Easley. Pt was given Tramadol by her home cares program but noted nausea and felt unwell taking this medication. Pt denies abdominal pain, dysuria, fever, chills or nightsweats. Pt denies any other sx or acute complaints.  patient states pain became worse and worse over the past 2 weeks until it became constant and that's why she came to ED .. patient has not seen pain management as outpatient >> discussed wiht patient re possible follow up for local injections for pain mgmt, patient amendable ..       Problem/Plan - 1:  ·  Problem: Right lumbar radiculopathy.   ·  Plan: Patient presenting with clinical symptoms of right-sided lower lumbar radiculopathy and pain with difficulty ambulating.  Patient will be a poor candidate for surgical intervention given advanced age.  trying gabapentin and Oxy for pain management.  Physical therapy evaluation appreciated : Recommendation for rehabilitation, however patient DOES NOT WANT to go to rehab  patient  in agreement with Further imaging now>> ordered MRI Lumbar spine   Pain management consult as needed   (As discussed with patient, patient likely would benefit from pain management consultation as outpatient for possible local injections (not available in the hospital))    Problem/Plan - 2:  ·  Problem: ART (acute kidney injury).   likely pre-renal as patient improved with hydration   monitor  Avoid nephrotoxic medications.    Problem/Plan - 3:  ·  Problem: CAD (coronary artery disease).   ·  Plan: Continue Current medications otherwise and monitor.  cardio appreciated     Problem/Plan - 4:  ·  Problem: Chronic atrial fibrillation.   ·  Plan: Continue home medications and monitor.    Encourages out of bed to chair As tolerated    dispo: f/u MRI       .

## 2023-05-26 NOTE — PROGRESS NOTE ADULT - SUBJECTIVE AND OBJECTIVE BOX
Patient is a 89y old  Female who presents with a chief complaint of radicular pain in Rt leg/ back.. (26 May 2023 07:28)      INTERVAL HPI/OVERNIGHT EVENTS:  T(C): 36.2 (23 @ 22:56), Max: 36.7 (23 @ 06:20)  HR: 69 (23 @ 22:56) (62 - 92)  BP: 114/71 (23 @ 22:56) (103/54 - 114/71)  RR: 18 (23 @ 22:56) (18 - 18)  SpO2: 97% (23 @ 22:56) (97% - 99%)  Wt(kg): --  I&O's Summary    25 May 2023 07:01  -  26 May 2023 07:00  --------------------------------------------------------  IN: 0 mL / OUT: 1200 mL / NET: -1200 mL        PAST MEDICAL & SURGICAL HISTORY:  Hyperlipidemia, Acquired      Coronary artery disease      Atrial fibrillation, unspecified type      S/P Hysterectomy      History of Cardiac Cath      H/O: Hysterectomy      History of Cardiac Cath      H/O:  section  x2      S/P CABG (coronary artery bypass graft)          SOCIAL HISTORY  Alcohol:  Tobacco:  Illicit substance use:    FAMILY HISTORY:    REVIEW OF SYSTEMS:  CONSTITUTIONAL: No fever, weight loss, or fatigue  EYES: No eye pain, visual disturbances, or discharge  ENMT:  No difficulty hearing, tinnitus, vertigo; No sinus or throat pain  NECK: No pain or stiffness  RESPIRATORY: No cough, wheezing, chills or hemoptysis; No shortness of breath  CARDIOVASCULAR: No chest pain, palpitations, dizziness, or leg swelling  GASTROINTESTINAL: No abdominal or epigastric pain. No nausea, vomiting, or hematemesis; No diarrhea or constipation. No melena or hematochezia.  GENITOURINARY: No dysuria, frequency, hematuria, or incontinence  NEUROLOGICAL: No headaches, memory loss, loss of strength, numbness, or tremors  SKIN: No itching, burning, rashes, or lesions   LYMPH NODES: No enlarged glands  ENDOCRINE: No heat or cold intolerance; No hair loss  MUSCULOSKELETAL: No joint pain or swelling; No muscle, back, or extremity pain  PSYCHIATRIC: No depression, anxiety, mood swings, or difficulty sleeping  HEME/LYMPH: No easy bruising, or bleeding gums  ALLERY AND IMMUNOLOGIC: No hives or eczema    RADIOLOGY & ADDITIONAL TESTS:    Imaging Personally Reviewed:  [ ] YES  [ ] NO    Consultant(s) Notes Reviewed:  [ ] YES  [ ] NO    PHYSICAL EXAM:  GENERAL: NAD, well-groomed, well-developed  HEAD:  Atraumatic, Normocephalic  EYES: EOMI, PERRLA, conjunctiva and sclera clear  ENMT: No tonsillar erythema, exudates, or enlargement; Moist mucous membranes, Good dentition, No lesions  NECK: Supple, No JVD, Normal thyroid  NERVOUS SYSTEM:  Alert & Oriented X3, Good concentration; Motor Strength 5/5 B/L upper and lower extremities; DTRs 2+ intact and symmetric  CHEST/LUNG: Clear to percussion bilaterally; No rales, rhonchi, wheezing, or rubs  HEART: Regular rate and rhythm; No murmurs, rubs, or gallops  ABDOMEN: Soft, Nontender, Nondistended; Bowel sounds present  EXTREMITIES:  2+ Peripheral Pulses, No clubbing, cyanosis, or edema  LYMPH: No lymphadenopathy noted  SKIN: No rashes or lesions    LABS:                        10.3   4.21  )-----------( 218      ( 26 May 2023 05:27 )             33.0     05-26    137  |  95<L>  |  55<H>  ----------------------------<  95  3.4<L>   |  27  |  1.16    Ca    9.7      26 May 2023 05:27  Phos  2.9     05-25  Mg     2.10     05-25    TPro  6.9  /  Alb  3.7  /  TBili  0.4  /  DBili  x   /  AST  23  /  ALT  9   /  AlkPhos  68  05-26        CAPILLARY BLOOD GLUCOSE                MEDICATIONS  (STANDING):  acetaminophen     Tablet .. 650 milliGRAM(s) Oral every 6 hours  apixaban 2.5 milliGRAM(s) Oral every 12 hours  chlorhexidine 2% Cloths 1 Application(s) Topical daily  gabapentin 100 milliGRAM(s) Oral two times a day  lactated ringers. 1000 milliLiter(s) (75 mL/Hr) IV Continuous <Continuous>  lidocaine   4% Patch 1 Patch Transdermal daily  metoprolol succinate ER 25 milliGRAM(s) Oral daily  pantoprazole    Tablet 40 milliGRAM(s) Oral before breakfast  polyethylene glycol 3350 17 Gram(s) Oral daily  senna 2 Tablet(s) Oral at bedtime  simvastatin 10 milliGRAM(s) Oral at bedtime  torsemide 40 milliGRAM(s) Oral every 12 hours    MEDICATIONS  (PRN):  aluminum hydroxide/magnesium hydroxide/simethicone Suspension 30 milliLiter(s) Oral every 4 hours PRN Dyspepsia  melatonin 3 milliGRAM(s) Oral at bedtime PRN Insomnia  ondansetron Injectable 4 milliGRAM(s) IV Push every 8 hours PRN Nausea and/or Vomiting  oxyCODONE    IR 5 milliGRAM(s) Oral every 6 hours PRN moderate to severe pain      Care Discussed with Consultants/Other Providers [ ] YES  [ ] NO Patient is a 89y old  Female who presents with a chief complaint of radicular pain in Rt leg/ back.. (26 May 2023 07:28)      INTERVAL HPI/OVERNIGHT EVENTS: NAD , pending MRI  T(C): 36.2 (23 @ 22:56), Max: 36.7 (23 @ 06:20)  HR: 69 (23 @ 22:56) (62 - 92)  BP: 114/71 (23 @ 22:56) (103/54 - 114/71)  RR: 18 (23 @ 22:56) (18 - 18)  SpO2: 97% (23 @ 22:56) (97% - 99%)  Wt(kg): --  I&O's Summary    25 May 2023 07:01  -  26 May 2023 07:00  --------------------------------------------------------  IN: 0 mL / OUT: 1200 mL / NET: -1200 mL        PAST MEDICAL & SURGICAL HISTORY:  Hyperlipidemia, Acquired      Coronary artery disease      Atrial fibrillation, unspecified type      S/P Hysterectomy      History of Cardiac Cath      H/O: Hysterectomy      History of Cardiac Cath      H/O:  section  x2      S/P CABG (coronary artery bypass graft)          SOCIAL HISTORY  Alcohol:  Tobacco:  Illicit substance use:    FAMILY HISTORY:    REVIEW OF SYSTEMS:  CONSTITUTIONAL: No fever, weight loss, or fatigue  EYES: No eye pain, visual disturbances, or discharge  ENMT:  No difficulty hearing, tinnitus, vertigo; No sinus or throat pain  NECK: No pain or stiffness  RESPIRATORY: No cough, wheezing, chills or hemoptysis; No shortness of breath  CARDIOVASCULAR: No chest pain, palpitations, dizziness, or leg swelling  GASTROINTESTINAL: No abdominal or epigastric pain. No nausea, vomiting, or hematemesis; No diarrhea or constipation. No melena or hematochezia.  GENITOURINARY: No dysuria, frequency, hematuria, or incontinence  NEUROLOGICAL: No headaches, memory loss, loss of strength, numbness, or tremors  SKIN: No itching, burning, rashes, or lesions   LYMPH NODES: No enlarged glands  ENDOCRINE: No heat or cold intolerance; No hair loss  MUSCULOSKELETAL: No joint pain or swelling; No muscle, back, or extremity pain  PSYCHIATRIC: No depression, anxiety, mood swings, or difficulty sleeping  HEME/LYMPH: No easy bruising, or bleeding gums  ALLERY AND IMMUNOLOGIC: No hives or eczema    RADIOLOGY & ADDITIONAL TESTS:    Imaging Personally Reviewed:  [ ] YES  [ ] NO    Consultant(s) Notes Reviewed:  [ ] YES  [ ] NO    PHYSICAL EXAM:  GENERAL: NAD, well-groomed, well-developed  HEAD:  Atraumatic, Normocephalic  EYES: EOMI, PERRLA, conjunctiva and sclera clear  ENMT: No tonsillar erythema, exudates, or enlargement; Moist mucous membranes, Good dentition, No lesions  NECK: Supple, No JVD, Normal thyroid  NERVOUS SYSTEM:  Alert & Oriented X3, Good concentration; Motor Strength 5/5 B/L upper and lower extremities; DTRs 2+ intact and symmetric  CHEST/LUNG: Clear to percussion bilaterally; No rales, rhonchi, wheezing, or rubs  HEART: Regular rate and rhythm; No murmurs, rubs, or gallops  ABDOMEN: Soft, Nontender, Nondistended; Bowel sounds present  EXTREMITIES:  2+ Peripheral Pulses, No clubbing, cyanosis, or edema  LYMPH: No lymphadenopathy noted  SKIN: No rashes or lesions    LABS:                        10.3   4.21  )-----------( 218      ( 26 May 2023 05:27 )             33.0     05-26    137  |  95<L>  |  55<H>  ----------------------------<  95  3.4<L>   |  27  |  1.16    Ca    9.7      26 May 2023 05:27  Phos  2.9     05-25  Mg     2.10     05-25    TPro  6.9  /  Alb  3.7  /  TBili  0.4  /  DBili  x   /  AST  23  /  ALT  9   /  AlkPhos  68  05-26        CAPILLARY BLOOD GLUCOSE                MEDICATIONS  (STANDING):  acetaminophen     Tablet .. 650 milliGRAM(s) Oral every 6 hours  apixaban 2.5 milliGRAM(s) Oral every 12 hours  chlorhexidine 2% Cloths 1 Application(s) Topical daily  gabapentin 100 milliGRAM(s) Oral two times a day  lactated ringers. 1000 milliLiter(s) (75 mL/Hr) IV Continuous <Continuous>  lidocaine   4% Patch 1 Patch Transdermal daily  metoprolol succinate ER 25 milliGRAM(s) Oral daily  pantoprazole    Tablet 40 milliGRAM(s) Oral before breakfast  polyethylene glycol 3350 17 Gram(s) Oral daily  senna 2 Tablet(s) Oral at bedtime  simvastatin 10 milliGRAM(s) Oral at bedtime  torsemide 40 milliGRAM(s) Oral every 12 hours    MEDICATIONS  (PRN):  aluminum hydroxide/magnesium hydroxide/simethicone Suspension 30 milliLiter(s) Oral every 4 hours PRN Dyspepsia  melatonin 3 milliGRAM(s) Oral at bedtime PRN Insomnia  ondansetron Injectable 4 milliGRAM(s) IV Push every 8 hours PRN Nausea and/or Vomiting  oxyCODONE    IR 5 milliGRAM(s) Oral every 6 hours PRN moderate to severe pain      Care Discussed with Consultants/Other Providers [ ] YES  [ ] NO

## 2023-05-26 NOTE — PROGRESS NOTE ADULT - SUBJECTIVE AND OBJECTIVE BOX
Cardiovascular Disease Progress Note    Overnight events: No acute events overnight.  no cp/sob/palps  Otherwise review of systems negative    Objective Findings:  T(C): 36.7 (05-26-23 @ 06:20), Max: 37.1 (05-25-23 @ 21:27)  HR: 92 (05-26-23 @ 06:20) (70 - 92)  BP: 113/67 (05-26-23 @ 06:20) (113/67 - 118/64)  RR: 18 (05-26-23 @ 06:20) (18 - 18)  SpO2: 98% (05-26-23 @ 06:20) (97% - 98%)  Wt(kg): --  Daily     Daily       Physical Exam:  Gen: NAD  HEENT: EOMI  CV: RRR, normal S1 + S2, no m/r/g  Lungs: CTAB  Abd: soft, non-tender  Ext: No edema    Telemetry:    Laboratory Data:                        10.3   4.21  )-----------( 218      ( 26 May 2023 05:27 )             33.0     05-26    137  |  95<L>  |  55<H>  ----------------------------<  95  3.4<L>   |  27  |  1.16    Ca    9.7      26 May 2023 05:27  Phos  2.9     05-25  Mg     2.10     05-25    TPro  6.9  /  Alb  3.7  /  TBili  0.4  /  DBili  x   /  AST  23  /  ALT  9   /  AlkPhos  68  05-26              Inpatient Medications:  MEDICATIONS  (STANDING):  acetaminophen     Tablet .. 650 milliGRAM(s) Oral every 6 hours  apixaban 2.5 milliGRAM(s) Oral every 12 hours  chlorhexidine 2% Cloths 1 Application(s) Topical daily  gabapentin 100 milliGRAM(s) Oral two times a day  lactated ringers. 1000 milliLiter(s) (75 mL/Hr) IV Continuous <Continuous>  lidocaine   4% Patch 1 Patch Transdermal daily  metoprolol succinate ER 25 milliGRAM(s) Oral daily  pantoprazole    Tablet 40 milliGRAM(s) Oral before breakfast  polyethylene glycol 3350 17 Gram(s) Oral daily  senna 2 Tablet(s) Oral at bedtime  torsemide 40 milliGRAM(s) Oral every 12 hours      Assessment:  89yo Female with past medical history of  CAD A FIB HLD p/w a worsening of her chronic R sided back pain.     Recs:  cardiac stable  denies any ischemic or hf sx  cw eliquis and rate control meds for af  suggest statin given hx of cad if not otherwise contraindicated  diuretics as dosed, appears euvolemic. b/l cr is 1.3-1.5  pain control  PT eval  pending mri if feasible      Over 25 minutes spent on total encounter; more than 50% of the visit was spent counseling and/or coordinating care by the attending physician.      Yung Ballard MD   Cardiovascular Disease  [phone]

## 2023-05-27 NOTE — PROGRESS NOTE ADULT - REASON FOR ADMISSION
radicular pain in Rt leg/ back..

## 2023-05-27 NOTE — DISCHARGE NOTE PROVIDER - NSDCCPCAREPLAN_GEN_ALL_CORE_FT
PRINCIPAL DISCHARGE DIAGNOSIS  Diagnosis: Radicular low back pain  Assessment and Plan of Treatment: Continue Gabapentin   Take OXY as needed for prn  Medrol dose pack prescribed. F/u with your PCP  It is advised that you see a chronic pain doctor as outpatient  Please follow up with PCP for follow up and med review        SECONDARY DISCHARGE DIAGNOSES  Diagnosis: Chronic atrial fibrillation  Assessment and Plan of Treatment: Continue Eliquis as prescribed    Diagnosis: CAD (coronary artery disease)  Assessment and Plan of Treatment: continue simvastatin    Diagnosis: ART (acute kidney injury)  Assessment and Plan of Treatment: Avoid nephrotoxic medications.   resolved     PRINCIPAL DISCHARGE DIAGNOSIS  Diagnosis: Radicular low back pain  Assessment and Plan of Treatment: Continue Gabapentin   Take OXY as needed for prn  Medrol dose pack prescribed. F/u with your PCP  It is advised that you see a chronic pain doctor as outpatient  Please follow up with PCP for follow up and med review.  Follow up with NeuroSurgery Dr. Koch in 1 week.        SECONDARY DISCHARGE DIAGNOSES  Diagnosis: Chronic atrial fibrillation  Assessment and Plan of Treatment: Continue Eliquis as prescribed    Diagnosis: CAD (coronary artery disease)  Assessment and Plan of Treatment: continue simvastatin    Diagnosis: ART (acute kidney injury)  Assessment and Plan of Treatment: Avoid nephrotoxic medications.   resolved

## 2023-05-27 NOTE — CONSULT NOTE ADULT - ASSESSMENT
89yo Female with past medical history of  CAD A FIB HLD p/w a worsening of her chronic R sided back pain.   Pt denies recent falls or trauma, uses a walker, lives alone but states her daughter is close by.   Pt's PCP is Dr. Beulah Easley. Pt was given Tramadol by her home cares program but noted nausea and felt unwell taking this medication. Pt denies abdominal pain, dysuria, fever, chills or nightsweats. Pt denies any other sx or acute complaints.    MRI showed lumbar degeneration, L4-5 right far lateral disk herniation

## 2023-05-27 NOTE — CHART NOTE - NSCHARTNOTEFT_GEN_A_CORE
CXR reviewed with Radiologist Dr. Gaspar; no contraindication to proceed with MRI. Discussed with MRI supervisor Fausto who is aware. Pt on schedule for MRI.
Neuro sx consult called as per attendings request
Pt's dgtr Stephanie updated via phone on plan of care ([phone]); aware pt is pending MRI.

## 2023-05-27 NOTE — DISCHARGE NOTE NURSING/CASE MANAGEMENT/SOCIAL WORK - PATIENT PORTAL LINK FT
You can access the FollowMyHealth Patient Portal offered by [facility] by registering at the following website: [email or web address]. By joining [facility]’s FollowMyHealth portal, you will also be able to view your health information using other applications (apps) compatible with our system.

## 2023-05-27 NOTE — DISCHARGE NOTE PROVIDER - NSDCMRMEDTOKEN_GEN_ALL_CORE_FT
Eliquis 2.5 mg oral tablet: 1 tab(s) orally 2 times a day  Metoprolol Succinate ER 25 mg oral tablet, extended release: 1 tab(s) orally once a day (at bedtime)  potassium chloride 20 mEq oral tablet, extended release: 1 orally 4 times a day  torsemide 20 mg oral tablet: 2 tab(s) orally 2 times a day   Eliquis 2.5 mg oral tablet: 1 tab(s) orally 2 times a day  Metoprolol Succinate ER 25 mg oral tablet, extended release: 1 tab(s) orally once a day (at bedtime)  polyethylene glycol 3350 oral powder for reconstitution: 17 gram(s) orally once a day  potassium chloride 20 mEq oral tablet, extended release: 1 orally 4 times a day  Salonpas Pain Relieving topical film: Apply topically to affected area once a day  senna leaf extract oral tablet: 2 tab(s) orally once a day (at bedtime)  simvastatin 10 mg oral tablet: 1 tab(s) orally once a day (at bedtime)  torsemide 20 mg oral tablet: 2 tab(s) orally 2 times a day   Eliquis 2.5 mg oral tablet: 1 tab(s) orally 2 times a day  gabapentin 100 mg oral capsule: 1 cap(s) orally 2 times a day  Medrol Dosepak 4 mg oral tablet: 1 orally once a day Use as directed  Metoprolol Succinate ER 25 mg oral tablet, extended release: 1 tab(s) orally once a day (at bedtime)  Narcan 4 mg/0.1 mL nasal spray: 4 milligram(s) intranasally every 2 to 3 minutes as needed for oversedation Call emergency personal after use  oxyCODONE 5 mg oral tablet: 1 tab(s) orally every 6 hours as needed for moderate to severe pain MDD: 4  pantoprazole 40 mg oral delayed release tablet: 1 tab(s) orally once a day (before a meal)  polyethylene glycol 3350 oral powder for reconstitution: 17 gram(s) orally once a day  potassium chloride 20 mEq oral tablet, extended release: 1 orally 4 times a day  Salonpas Pain Relieving topical film: Apply topically to affected area once a day  senna leaf extract oral tablet: 2 tab(s) orally once a day (at bedtime)  simvastatin 10 mg oral tablet: 1 tab(s) orally once a day (at bedtime)  torsemide 20 mg oral tablet: 2 tab(s) orally 2 times a day

## 2023-05-27 NOTE — CONSULT NOTE ADULT - PROBLEM SELECTOR RECOMMENDATION 9
- No surgical intervention for MRI findings  - Strongly suggested chronic pain management as outpatient  - Can consider medrol dose pack as long as blood sugars are well controlled  - Can follow up with Dr. Fern Koch in 3 months        d/w ATtending

## 2023-05-27 NOTE — DISCHARGE NOTE PROVIDER - NSDCCAREPROVSEEN_GEN_ALL_CORE_FT
Rafaelorbjodi Chung  Hoorbjodi Hallorbjodi Herman  Silvia Jeromy  User ADM  Yung Ballard  Team [facility] Medicine ACP      [ Greater than 35 min spent for discharge services.   PEEWEE Chung ]

## 2023-05-27 NOTE — PROGRESS NOTE ADULT - PROVIDER SPECIALTY LIST ADULT
Cardiology
Internal Medicine
Cardiology
Internal Medicine

## 2023-05-27 NOTE — DISCHARGE NOTE PROVIDER - PROVIDER TOKENS
PROVIDER:[TOKEN:[[id number]]] PROVIDER:[TOKEN:[[id number]]],PROVIDER:[TOKEN:[[id number]]]

## 2023-05-27 NOTE — CONSULT NOTE ADULT - SUBJECTIVE AND OBJECTIVE BOX
CHIEF COMPLAINT: back pain    HISTORY OF PRESENT ILLNESS:  89yo Female with past medical history of  CAD A FIB HLD p/w a worsening of her chronic R sided back pain.   Pt denies recent falls or trauma, uses a walker, lives alone but states her daughter is close by.   Pt's PCP is Dr. Beulah Easley. Pt was given Tramadol by her home cares program but noted nausea and felt unwell taking this medication. Pt denies abdominal pain, dysuria, fever, chills or nightsweats. Pt denies any other sx or acute complaints.  patient states pain became worse and worse over the past 2 weeks until it became constant and that's why she came to ED .. patient has not seen pain management as outpatient >> discussed wiht patient re possible follow up for local injections for pain mgmt, patient amendable ..   patient states is able to walk with walker if pain controlled does not want to go to rehabilitation..    Allergies    codeine (Stomach Upset)    Intolerances    	    MEDICATIONS:  apixaban 2.5 milliGRAM(s) Oral every 12 hours  metoprolol succinate ER 25 milliGRAM(s) Oral daily  torsemide 40 milliGRAM(s) Oral every 12 hours        acetaminophen     Tablet .. 650 milliGRAM(s) Oral every 6 hours PRN  gabapentin 100 milliGRAM(s) Oral two times a day  melatonin 3 milliGRAM(s) Oral at bedtime PRN  ondansetron Injectable 4 milliGRAM(s) IV Push every 8 hours PRN  oxyCODONE    IR 5 milliGRAM(s) Oral every 6 hours PRN    aluminum hydroxide/magnesium hydroxide/simethicone Suspension 30 milliLiter(s) Oral every 4 hours PRN  pantoprazole    Tablet 40 milliGRAM(s) Oral before breakfast  polyethylene glycol 3350 17 Gram(s) Oral daily  senna 2 Tablet(s) Oral at bedtime      chlorhexidine 2% Cloths 1 Application(s) Topical daily  lactated ringers. 1000 milliLiter(s) IV Continuous <Continuous>      PAST MEDICAL & SURGICAL HISTORY:  Hyperlipidemia, Acquired      Coronary artery disease      Atrial fibrillation, unspecified type      S/P Hysterectomy      History of Cardiac Cath      H/O: Hysterectomy      History of Cardiac Cath      H/O:  section  x2      S/P CABG (coronary artery bypass graft)          FAMILY HISTORY:  Family history of hypertension    Family history of coronary artery disease in son (Child)        SOCIAL HISTORY:    non smoker. indep in adl    REVIEW OF SYSTEMS:  See HPI, otherwise complete 10 point review of systems negative    [ ] All others negative	      PHYSICAL EXAM:  T(C): 36.3 (23 @ 06:14), Max: 36.7 (23 @ 13:30)  HR: 64 (23 @ 06:14) (62 - 65)  BP: 126/50 (23 @ 06:14) (103/65 - 126/50)  RR: 18 (23 @ 06:14) (16 - 18)  SpO2: 98% (23 @ 06:14) (96% - 100%)  Wt(kg): --  I&O's Summary      Appearance: No Acute Distress	  HEENT:  Normal oral mucosa, PERRL, EOMI	  Cardiovascular: Normal S1 S2, No JVD, No murmurs/rubs/gallops  Respiratory: Lungs clear to auscultation bilaterally  Gastrointestinal:  Soft, Non-tender, + BS	  Skin: No rashes, No ecchymoses, No cyanosis	  Neurologic: Non-focal  Extremities: No clubbing, cyanosis or edema  Vascular: Peripheral pulses palpable 2+ bilaterally  Psychiatry: A & O x 3, Mood & affect appropriate    Laboratory Data:	 	    CBC Full  -  ( 23 May 2023 06:17 )  WBC Count : 4.01 K/uL  Hemoglobin : 11.6 g/dL  Hematocrit : 37.9 %  Platelet Count - Automated : 263 K/uL  Mean Cell Volume : 89.6 fL  Mean Cell Hemoglobin : 27.4 pg  Mean Cell Hemoglobin Concentration : 30.6 gm/dL  Auto Neutrophil # : 2.49 K/uL  Auto Lymphocyte # : 0.98 K/uL  Auto Monocyte # : 0.43 K/uL  Auto Eosinophil # : 0.08 K/uL  Auto Basophil # : 0.01 K/uL  Auto Neutrophil % : 62.2 %  Auto Lymphocyte % : 24.4 %  Auto Monocyte % : 10.7 %  Auto Eosinophil % : 2.0 %  Auto Basophil % : 0.2 %        138  |  93<L>  |  55<H>  ----------------------------<  92  3.4<L>   |  31  |  1.35<H>      135  |  91<L>  |  63<H>  ----------------------------<  93  3.4<L>   |  30  |  1.69<H>    Ca    10.3      23 May 2023 06:17  Ca    10.6<H>      22 May 2023 14:17  Phos  3.1       Mg     2.50         TPro  7.5  /  Alb  4.0  /  TBili  0.5  /  DBili  x   /  AST  29  /  ALT  11  /  AlkPhos  75    TPro  7.7  /  Alb  4.2  /  TBili  0.5  /  DBili  x   /  AST  30  /  ALT  11  /  AlkPhos  80        proBNP:   Lipid Profile:   HgA1c:   TSH: Thyroid Stimulating Hormone, Serum: 4.26 uIU/mL ( @ 14:17)        CARDIAC MARKERS:            Interpretation of Telemetry: 	    ECG:  	  RADIOLOGY:  OTHER: 	    PREVIOUS DIAGNOSTIC TESTING:    [ ] Echocardiogram:  [ ] Catheterization:  [ ] Stress Test:  	    Assessment:  89yo Female with past medical history of  CAD A FIB HLD p/w a worsening of her chronic R sided back pain.     Recs:  cardiac stable  denies any ischemic or hf sx  cw eliquis and rate control meds for af  suggest statin given hx of cad if not otherwise contraindicated  diuretics as dosed, appears euvolemic. b/l cr is 1.3-1.5  pain control        Greater than 60 minutes spent on total encounter; more than 50% of the visit was spent counseling and/or coordinating care by the attending physician.   	  Yung Ballard MD   Cardiovascular Diseases  [phone]    
  Patient is a 89yo Female with past medical history of  CAD A FIB HLD p/w a worsening of her chronic R sided back pain.   Pt denies recent falls or trauma, uses a walker, lives alone but states her daughter is close by.   Pt's PCP is Dr. Beulah Easley. Pt was given Tramadol by her home cares program but noted nausea and felt unwell taking this medication. Pt denies abdominal pain, dysuria, fever, chills or nightsweats. Pt denies any other sx or acute complaints.  patient states pain became worse and worse over the past 2 weeks until it became constant and that's why she came to ED .. patient has not seen pain management as outpatient >> discussed wiht patient re possible follow up for local injections for pain mgmt, patient amendable ..   patient states is able to walk with walker if pain controlled does not want to go to rehabilitation...     --------------------------------------------------------------------------------  PAST MEDICAL / SURGICAL  HISTORY:    Hyperlipidemia, Acquired  Coronary artery disease  Atrial fibrillation, unspecified type    S/P Hysterectomy  History of Cardiac Cath  H/O: Hysterectomy  History of Cardiac Cath  H/O:  section x2  S/P CABG (coronary artery bypass graft)    --------------------------------------------------------------------------------  FAMILY HISTORY:    Family history of hypertension  Family history of coronary artery disease in son (Child)    --------------------------------------------------------------------------------  SOCIAL HISTORY:  Alcohol: None reported  Smoking: remote smoking history     --------------------------------------------------------------------------------  ALLERGIES:    [As libby, reviewed]    --------------------------------------------------------------------------------  MEDICATIONS:   [As libby, reviewed]        Coronary artery disease      Atrial fibrillation, unspecified type      S/P Hysterectomy      History of Cardiac Cath      H/O: Hysterectomy      History of Cardiac Cath      H/O:  section  x2      S/P CABG (coronary artery bypass graft)        Allergies    codeine (Stomach Upset)    Intolerances      aluminum hydroxide/magnesium hydroxide/simethicone Suspension 30 milliLiter(s) Oral every 4 hours PRN  apixaban 2.5 milliGRAM(s) Oral every 12 hours  chlorhexidine 2% Cloths 1 Application(s) Topical daily  gabapentin 100 milliGRAM(s) Oral two times a day  lactated ringers. 1000 milliLiter(s) IV Continuous <Continuous>  lidocaine   4% Patch 1 Patch Transdermal daily  melatonin 3 milliGRAM(s) Oral at bedtime PRN  metoprolol succinate ER 25 milliGRAM(s) Oral daily  ondansetron Injectable 4 milliGRAM(s) IV Push every 8 hours PRN  oxyCODONE    IR 5 milliGRAM(s) Oral every 6 hours PRN  pantoprazole    Tablet 40 milliGRAM(s) Oral before breakfast  polyethylene glycol 3350 17 Gram(s) Oral daily  senna 2 Tablet(s) Oral at bedtime  simvastatin 10 milliGRAM(s) Oral at bedtime  torsemide 40 milliGRAM(s) Oral every 12 hours    SOCIAL HISTORY:  FAMILY HISTORY:  Family history of hypertension    Family history of coronary artery disease in son (Child)      Vital Signs Last 24 Hrs  T(C): 36.7 (27 May 2023 05:47), Max: 36.7 (27 May 2023 05:47)  T(F): 98.1 (27 May 2023 05:47), Max: 98.1 (27 May 2023 05:47)  HR: 62 (27 May 2023 05:47) (62 - 69)  BP: 107/60 (27 May 2023 05:47) (104/60 - 114/71)  BP(mean): --  RR: 18 (27 May 2023 05:47) (18 - 18)  SpO2: 98% (27 May 2023 05:47) (97% - 98%)    Parameters below as of 27 May 2023 05:47  Patient On (Oxygen Delivery Method): room air        PHYSICAL EXAM:  Awake Alert Attentive Affect appropriate Ox3, Sitting in chair at bedside with daughter  Tone: normal.    Pain to L4 distribution                Strength:     [X] Upper extremity                      Delt       Bicep    Tricep                                                  R         5/5        5/5        5/5       5/5                                               L          5/5        5/5        5/5       5/5  [X] Lower extremity                       HF          KE          KF        DF         PF    EHL                                               R        5/5        5/5        5/5       5/5       5/5      5/5                                               L         5/5        5/5       5/5       5/5        5/5       5/5  Sensory Intact to Light Touch  Reflexes WNL, No clonus, Toes down going    LABS:                          11.0   4.02  )-----------( 227      ( 27 May 2023 05:45 )             36.1     05    138  |  97<L>  |  52<H>  ----------------------------<  90  3.6   |  28  |  1.04    Ca    9.5      27 May 2023 05:45  Phos  3.0       Mg     2.30         TPro  6.9  /  Alb  3.7  /  TBili  0.4  /  DBili  x   /  AST  23  /  ALT  9   /  AlkPhos  68            RADIOLOGY & ADDITIONAL STUDIES:  < from: MR Lumbar Spine No Cont (23 @ 18:00) >  leg radiculopathy    ADDITIONAL CLINICAL INFORMATION: Not Applicable    TECHNIQUE: Multiplanar, multisequence MRI was performed of the lumbar   spine.  IV Contrast:    PRIOR STUDIES: No priors available for comparison.    FINDINGS:    There is a moderate levoscoliosis.    The visualized thoracic and lumbosacral vertebral bodies are normal in   height and signal intensity. No acute fractures or dislocations are   identified. Degenerative disc disease is identified at all lumbar levels.   There is fusion at the L5-S1 disc spaces.    T12-L1 is unremarkable. The conus terminates at the L1-2 level. L1-2 is   unremarkable. L2-3 is unremarkable. At L3-4 there is mild right neural   foraminal stenosis due to the scoliosis. At L4-5 soft tissue in the right   neural foramen is identified suggesting a far lateral disc herniation.   This is inseparable from the right L4 nerve root. L5-S1 is unremarkable.    The paraspinal soft tissues are unremarkable.    IMPRESSION: Degenerative changes. Moderate right-sided levoscoliosis.   Right neural foraminal soft tissue L4-5 inseparable from the right L4   nerve root suggesting a far lateral disc herniation.    < end of copied text >

## 2023-05-27 NOTE — DISCHARGE NOTE PROVIDER - NSDCACTIVITY_GEN_ALL_CORE
ROSALIND AYALA    Patient Age: 70 year old   Refill request by: Phone.  Caller informed to check with the pharmacy later for their refill.  If problems arise, we will contact patient.  Refill to be: ePrescribed to [facility] pharmacy    Medication requested to be refilled:   ALPRAZolam (XANAX) 0.5 MG tablet    fluticasone (FLONASE) 50 MCG/ACT nasal spray    loratadine (CLARITIN) 10 MG tablet    Caller advised refill may take up to 72 business hours to process.            WEIGHT AND HEIGHT:   Wt Readings from Last 1 Encounters:   07/06/20 56.2 kg (124 lb)     Ht Readings from Last 1 Encounters:   04/07/20 5' 5\" (1.651 m)     BMI Readings from Last 1 Encounters:   07/06/20 20.63 kg/m²       ALLERGIES:  Gluten meal   (food or med)  Current Outpatient Medications   Medication   • methylPREDNISolone (MEDROL) 4 MG tablet   • meloxicam (MOBIC) 15 MG tablet   • VITAMIN D PO   • ALPRAZolam (XANAX) 0.5 MG tablet   • fluticasone (FLONASE) 50 MCG/ACT nasal spray   • loratadine (CLARITIN) 10 MG tablet   • vitamin B-12 (CYANOCOBALAMIN) 1000 MCG tablet   • ERGOCALCIFEROL PO   • Unable to Find Medication     No current facility-administered medications for this visit.      PHARMACY to use: see below          Pharmacy preference(s) on file:   [id number] - [place], [place] - [address]  [address] [facility] [address]  Phone: [phone] Fax: [phone]      CALL BACK INFO: Ok to leave response (including medical information) on answering machine  ROUTING: Patient's physician/staff        PCP: Megan Ortiz MD         INS: Payor: [facility] MEDICARE ADVANTAGE / Plan: MEDICARE ADVANTAGE [id number] / Product Type: PFFS   PATIENT ADDRESS:  [address] [id number]   Ambulate as tolerated/No restrictions

## 2023-05-27 NOTE — PROGRESS NOTE ADULT - SUBJECTIVE AND OBJECTIVE BOX
Cardiovascular Disease Progress Note    Overnight events: No acute events overnight.  no cp/sob/palps  Otherwise review of systems negative    Objective Findings:  T(C): 36.7 (05-27-23 @ 05:47), Max: 36.7 (05-27-23 @ 05:47)  HR: 62 (05-27-23 @ 05:47) (62 - 69)  BP: 107/60 (05-27-23 @ 05:47) (103/54 - 114/71)  RR: 18 (05-27-23 @ 05:47) (18 - 18)  SpO2: 98% (05-27-23 @ 05:47) (97% - 99%)  Wt(kg): --  Daily     Daily       Physical Exam:  Gen: NAD  HEENT: EOMI  CV: RRR, normal S1 + S2, no m/r/g  Lungs: CTAB  Abd: soft, non-tender  Ext: No edema    Telemetry:    Laboratory Data:                        11.0   4.02  )-----------( 227      ( 27 May 2023 05:45 )             36.1     05-27    138  |  97<L>  |  52<H>  ----------------------------<  90  3.6   |  28  |  1.04    Ca    9.5      27 May 2023 05:45  Phos  3.0     05-27  Mg     2.30     05-27    TPro  6.9  /  Alb  3.7  /  TBili  0.4  /  DBili  x   /  AST  23  /  ALT  9   /  AlkPhos  68  05-26              Inpatient Medications:  MEDICATIONS  (STANDING):  acetaminophen     Tablet .. 650 milliGRAM(s) Oral every 6 hours  apixaban 2.5 milliGRAM(s) Oral every 12 hours  chlorhexidine 2% Cloths 1 Application(s) Topical daily  gabapentin 100 milliGRAM(s) Oral two times a day  lactated ringers. 1000 milliLiter(s) (75 mL/Hr) IV Continuous <Continuous>  lidocaine   4% Patch 1 Patch Transdermal daily  metoprolol succinate ER 25 milliGRAM(s) Oral daily  pantoprazole    Tablet 40 milliGRAM(s) Oral before breakfast  polyethylene glycol 3350 17 Gram(s) Oral daily  senna 2 Tablet(s) Oral at bedtime  simvastatin 10 milliGRAM(s) Oral at bedtime  torsemide 40 milliGRAM(s) Oral every 12 hours      Assessment:  89yo Female with past medical history of  CAD A FIB HLD p/w a worsening of her chronic R sided back pain.     Recs:  cardiac stable  denies any ischemic or hf sx  cw eliquis and rate control meds for af  suggest statin given hx of cad if not otherwise contraindicated  diuretics as dosed, appears euvolemic. b/l cr is 1.3-1.5  pain control  PT eval  pending mri       Over 25 minutes spent on total encounter; more than 50% of the visit was spent counseling and/or coordinating care by the attending physician.      Yung Ballard MD   Cardiovascular Disease  [phone]

## 2023-05-27 NOTE — DISCHARGE NOTE PROVIDER - HOSPITAL COURSE
89yo F, PMhx CAD AFIB HLD p/w a worsening of her chronic R sided back pain    Right lumbar radiculopathy.   - Patient presenting with clinical symptoms of right-sided lower lumbar radiculopathy and pain with difficulty ambulating.  - Patient will be a poor candidate for surgical intervention given advanced age.  - Xray: no acute compression fx, +grade 1 anterolisthesis of L4/L5 and spondylolusis, +degnerative disease  - MRI: lumbar degeneration, L4-5 right far lateral disk herniation  -NSx consulted: No surgical intervention. Rec:Medrol dose pack  - gabapentin and oxy   - PT eval--> rec rehab, pt prefers home   -Follow up with out/pt pain doctor    ART  -Patients creatinine elevated compared to prior readings   Unclear if patients baseline has changed.  Will give short course of IV hydration and revalue it.  If not better imaging of the kidneys.    CAD   -cards consulted  -simvastatin started per cards     Chronic atrial fibrillation.   -Continue home medications and monitor. 89yo F, PMhx CAD AFIB HLD p/w a worsening of her chronic R sided back pain    Right lumbar radiculopathy.   - Patient presenting with clinical symptoms of right-sided lower lumbar radiculopathy and pain with difficulty ambulating.  - Patient will be a poor candidate for surgical intervention given advanced age.  - Xray: no acute compression fx, +grade 1 anterolisthesis of L4/L5 and spondylolysis, +degenerative disease  - MRI: lumbar degeneration, L4-5 right far lateral disk herniation  -NSx consulted: No surgical intervention. Rec: Medrol dose pack  - gabapentin and oxy   - PT eval--> rec rehab, pt prefers home   -Follow up with out/pt pain doctor    ART  -Patients creatinine elevated compared to prior readings   Unclear if patients baseline has changed.  Will give short course of IV hydration and revalue it.  If not better imaging of the kidneys.    CAD   -cards consulted  -simvastatin started per cards     Chronic atrial fibrillation.   -Continue home medications and monitor.    On 5/27/23, discussed with Dr. Chung, patient is medically cleared and optimized for discharge today. All medications were reviewed with attending, and sent to mutually agreed upon pharmacy. 89yo F, PMhx CAD AFIB HLD p/w a worsening of her chronic R sided back pain    Right lumbar radiculopathy.   - Patient presenting with clinical symptoms of right-sided lower lumbar radiculopathy and pain with difficulty ambulating.  - Patient will be a poor candidate for surgical intervention given advanced age.  - Xray: no acute compression fx, +grade 1 anterolisthesis of L4/L5 and spondylolysis, +degenerative disease  - MRI: lumbar degeneration, L4-5 right far lateral disk herniation  -NSx consulted: No surgical intervention. Rec: Medrol dose pack  - gabapentin and oxy   - PT eval--> rec rehab, pt prefers home   -Follow up with out/pt pain doctor.    ART  -Patients creatinine elevated compared to prior readings   Unclear if patients baseline has changed.  Will give short course of IV hydration and revalue it.  If not better imaging of the kidneys.    CAD   -cards consulted  -simvastatin started per cards     Chronic atrial fibrillation.   -Continue home medications and monitor.    On 5/27/23, discussed with Dr. Chung, patient is medically cleared and optimized for discharge today. All medications were reviewed with attending, and sent to mutually agreed upon pharmacy. 89yo F, PMhx CAD AFIB HLD p/w a worsening of her chronic R sided back pain    Right lumbar radiculopathy.   - Patient presenting with clinical symptoms of right-sided lower lumbar radiculopathy and pain with difficulty ambulating.  - Patient will be a poor candidate for surgical intervention given advanced age.  - Xray: no acute compression fx, +grade 1 anterolisthesis of L4/L5 and spondylolysis, +degenerative disease  - MRI: lumbar degeneration, L4-5 right far lateral disk herniation  -NSx consulted: No surgical intervention. Rec: Medrol dose pack  - gabapentin and oxy   - PT eval--> rec rehab, pt prefers home   -Follow up with out/pt pain doctor.        On 5/27/23, discussed with Dr. Chung, patient is medically cleared and optimized for discharge today. All medications were reviewed with attending, and sent to mutually agreed upon pharmacy.

## 2023-05-27 NOTE — DISCHARGE NOTE PROVIDER - CARE PROVIDER_API CALL
Yung Ballard  Cardiovascular Disease  [address] [place] [address]  Phone: [phone]  Fax: [phone]  Follow Up Time:    Yung Ballard  Cardiovascular Disease  [address] [address]  Phone: [phone]  Fax: [phone]  Follow Up Time:     Fern Koch Jaylen  [facility]  [address]  [place] [address]  Phone: [phone]  Fax: [phone]  Follow Up Time:

## 2023-06-14 NOTE — COUNSELING
[Improve mobility] : improve mobility [Improve pain control] : improve pain control [Maintain functional ability] : maintain functional ability [Completed Healthcare Proxy] : completed healthcare proxy [Completed Medical Orders for Life-Sustaining Treatment] : completed medical orders for life-sustaining treatment [Full Code] : Code Status: Full Code [Limited] : Treatment Guidelines: Limited [Trial of Intubation] : Intubation: Trial of Intubation [[facility] Date: _____] [facility]last [facility]: [unfilled] [Normal Weight - ( BMI  <25 )] : normal weight - ( BMI  <25 ) [Continue diet as tolerated] : continue diet as tolerated based on goals of care [Non - Smoker] : non-smoker [Smoke/CO Detectors] : smoke/CO detectors [Use assistive device to avoid falls] : use assistive device to avoid falls [Remove clutter and unsafe carpeting to avoid falls] : remove clutter and unsafe carpeting to avoid falls [Not Recommended] : Aspirin use not recommended due to overall prognosis [] : foot exam

## 2023-06-14 NOTE — CHRONIC CARE ASSESSMENT
[PPS Score: ____] : Palliative Performance Scale (PPS) Score: [unfilled] [Inadequate social support] : inadequate social support

## 2023-07-03 PROBLEM — I27.20 PULMONARY HYPERTENSION: Status: ACTIVE | Noted: 2022-02-15

## 2023-07-03 PROBLEM — M54.31 BILATERAL SCIATICA: Status: ACTIVE | Noted: 2023-01-01

## 2023-07-03 PROBLEM — I48.91 ATRIAL FIBRILLATION: Status: ACTIVE | Noted: 2022-02-15

## 2023-07-03 NOTE — HEALTH RISK ASSESSMENT
[HRA Reviewed] : Health risk assessment reviewed [Independent] : managing finances [Some assistance needed] : preparing meals [Full assistance needed] : using transportation [One fall no injury in past year] : Patient reported one fall in the past year without injury [Yes] : The patient does have visual impairment [TimeGetUpGo] : 0 [de-identified] : Uses glasses

## 2023-07-03 NOTE — PHYSICAL EXAM
[No Acute Distress] : no acute distress [Well Nourished] : well nourished [Well Developed] : well developed [Normal Sclera/Conjunctiva] : normal sclera/conjunctiva [PERRL] : pupils equal, round and reactive to light [EOMI] : extra ocular movement intact [Normal Outer Ear/Nose] : the ears and nose were normal in appearance [Normal Oropharynx] : the oropharynx was normal [No JVD] : no jugular venous distention [Supple] : the neck was supple [No Respiratory Distress] : no respiratory distress [Clear to Auscultation] : lungs were clear to auscultation bilaterally [No Accessory Muscle Use] : no accessory muscle use [Normal Rate] : heart rate was normal  [Regular Rhythm] : with a regular rhythm [Normal S1, S2] : normal S1 and S2 [No Murmurs] : no murmurs heard [Pedal Pulses Present] : the pedal pulses are present [Normal Bowel Sounds] : normal bowel sounds [Non Tender] : non-tender [Soft] : abdomen soft [Not Distended] : not distended [No CVA Tenderness] : no ~M costovertebral angle tenderness [No Spinal Tenderness] : no spinal tenderness [No Joint Swelling] : no joint swelling seen [No Rash] : no rash [No Skin Lesions] : no skin lesions [Cranial Nerves Intact] : cranial nerves 2-12 were intact [No Motor Deficits] : the motor exam was normal [No Gross Sensory Deficits] : no gross sensory deficits [Oriented x3] : oriented to person, place, and time [Normal Affect] : the affect was normal [Normal Mood] : the mood was normal [de-identified] : pleasant [de-identified] : trace edema R>L [de-identified] : slow unsteady gait

## 2023-07-03 NOTE — REASON FOR VISIT
[Admitted on: ___] : The patient was admitted on [unfilled] [Discharged on ___] : discharged on [unfilled] [Subsequent Annual Medicare Wellness Visit] : a subsequent annual Medicare wellness visit [Pre-Visit Preparation] : pre-visit preparation was done [Post-hospitalization from ___ Hospital] : Post-hospitalization from [unfilled] Hospital [FreeTextEntry2] : chart review

## 2023-07-03 NOTE — HISTORY OF PRESENT ILLNESS
[Patient] : patient [FreeTextEntry1] : n/a  [FreeTextEntry2] : HPI:\par 89yo F with PMH of CAD s/p CABG () followed by stents, Afib (on Eliquis), pHTN seen for f/u visit.\par \par Social History:\par -. Son has . Gnosticism and has Spiritism support but doesn't utilize. \par -Caregivers: daughter helps, unclear how often\par -MOLST: Full Code, Trial intubation and feeding tube, IV/antibiotics ok and hospitalize. Reviewed 2022\par -HCP: Stephanie Smith (dtr) [phone]. Harjeet Valentine (alt) grandson\par -Cardiologist: Dr Alexei Ballard ([place]), still follows as often as possible.\par \par Interval Events:\par -Interviewed alone.\par -Weighing decision to get epidural injection for back pain, consulted with pain management.\par -To see cardiology soon for clearance\par -No other interim events\par -Follows with PCP and specialists\par \par -CAD s/p CABG and stents: chronic. Continue torsemide 40mg BID and metoprolol succinate 25mg daily. \par -Afib: SR today. Continue Metoprolol and Eliquis\par -Pulmonary HTN: chronic. GODFREY. Continue lasix and metolazone. \par -Chronic constipation: miralax not working. Ordered Sennakot and Bisacodyl prn \par -Sciatica: pain controlled with tramadol\par -Hypokalemia: stable. reviewed lab. Continue potassium replacement\par -Gout: Recent acute event, both legs "blew up". Allopurinol 300mg daily, tramadol PRN pain though uses primarily for back.\par -CHF: Torsemide 40mg BID. Continues metolazone 2.5mg MWF\par \par Subjective:\par -Appetite/weight: appetite is good. Weight stable. \par -Gait/falls: unsteady gait. fall twice in the past year. \par -Pain: sciatica pain. Uses tramadol in the morning. \par -Sleep: poorly. wakes up multiple times \par -BMs: chronic constipation. Takes Miralax but doesn't think it will work. \par -Urine: continent. no dysuria, \par -Skin: intact but bruising due to eliquis\par -DME: walker, commode, but need shower chair, no grab bars\par -Mood/memory: mood is ok. memory is pretty good. \par -Communication: full conversation. \par -Hospitalizations in the past year:\par \par DME: Shower chair

## 2023-07-04 NOTE — H&P ADULT - PROBLEM SELECTOR PLAN 6
AF with grzegorz  will hold Toprol for now  monitor closely  cardiologist aware, will follow and adjust / reintroduce medications as needed

## 2023-07-04 NOTE — ED ADULT NURSE NOTE - SUICIDE SCREENING QUESTION 3
No

## 2023-07-04 NOTE — ED PROVIDER NOTE - OBJECTIVE STATEMENT
Attending Marga Gaspar: 89-year-old female with multiple medical issues including history of open heart surgery as well as prior cardiac stents presenting with abdominal pain, nausea and generalized weakness.  Patient states for the last week has not been able to eat or drink is just feels very nauseous.  Is passing gas but has not had a bowel movement in quite some time.  Has been taking stool softeners without any significant improvement.  No fevers or chills.  Denies any chest pain.  Patient does have a history of atrial fibrillation and is on Eliquis and has been compliant.

## 2023-07-04 NOTE — ED PROVIDER NOTE - PHYSICAL EXAMINATION
Attending Marga Gaspar: Gen: frail appearing, heent: atrauamtic, eomi, perrla, dry mucous membranes, op pink, uvula midline, neck; nttp, no nuchal rigidity, chest: nttp, no crepitus, cv: rrr,lungs: ctab, abd: soft, nontender, nondistended, no peritoneal signs, , no guarding, ext: wwp, neg homans, skin: no rash, neuro: awake and alert, following commands, speech clear, sensation and strength intact, no focal deficits

## 2023-07-04 NOTE — ED PROVIDER NOTE - PROGRESS NOTE DETAILS
Attending Marga Gaspar: blood work shows evidence of sig electrolyte abnormalities, pt reports not eating and drinking. denies any chest pain or any black sotols labs showd elevated troponin. pt denies any chest pain but does report some [place] will d/w cards Attending Marga Gaspar: ct showed faulkner in vagina, which was removed. paged dr dueñas awaiting call back. will continue to replete electrolytes Attending Marga Gaspar: jed coy will hold on heparin gtt at this time and continue to monitor electrolytes will admit

## 2023-07-04 NOTE — ED ADULT NURSE NOTE - OBJECTIVE STATEMENT
89 y.o F PMH presenting to the ED from home via EMS for urinary symptoms x2 weeks, generalized malaise/fatigue, redness/rash to the pelvic region. 89 y.o F PMH presenting to the ED from home via EMS for painful urination, rectal pain, nausea and redness/rash to the pelvic regionx 1 week. Pt states that she has been experiencing decreased appetite, skipped her lasix pill today due to decrease urinary output. Denies hematuria, hematochezia, vomiting, diarrhea, sob, c/p. AOx4, MAEx4, respirations even and unlabored, abd soft nontender/nondistended. Patient safety maintained, bed is in lowest position, wheels locked, and side rails raised. Patient oriented to call bell, and call bell is within reach.

## 2023-07-04 NOTE — ED CLERICAL - NS ED CLERK NOTE PRE-ARRIVAL INFORMATION; ADDITIONAL PRE-ARRIVAL INFORMATION

## 2023-07-04 NOTE — PROVIDER CONTACT NOTE (CRITICAL VALUE NOTIFICATION) - ASSESSMENT
alert and oriented x4; denies any chest pain, sob, dizziness, lightheadeness, headache, and palpitations. Afib on tele monitor. pt state she is nauseous; pending medicaitn administration.

## 2023-07-04 NOTE — H&P ADULT - PROBLEM SELECTOR PLAN 1
Patient presenting with clinical symptoms of right-sided lower lumbar radiculopathy and pain with difficulty ambulating.  Patient will be a poor candidate for surgical intervention given advanced age.  Will try gabapentin for pain management.  Will give other options for pain management.  Physical therapy evaluation.  Further imaging as needed if patient not improved.  Pain management consult is not better. unclear etiology  no significant stool burden on CT  ? possible recent viral illness with residual ( there has been a lot of Norovirus in the community)   supportive care for now  ordered antiemetics ATC for now for a few days  placed on Full liquid diet >> advance as able  OOB to chair as able   encourage oral intake as tolerated

## 2023-07-04 NOTE — H&P ADULT - ASSESSMENT
Patient is a 89yo woman with past medical history of  CAD A FIB HLD p/w nausea, occasional abd pain, decreased oral intake, weakness ...   Patient states for the past several weeks has not been able to eat or drink as much, been nauseated, decreased urine intake and BMs, only at times taking broth and crackers, not been taking medications as much either, ... just feels very nauseous, poor appetite  patient was getting better so decided to come to the ED  No fevers or chills.  Denies any chest pain.  Patient does have a history of atrial fibrillation and is on Eliquis and has been compliant, bradycardic to 40s-50s in ED  patient dehydrated with some electrolyte abnormalities, admitted for further management  patient started in ED on antibiotics for possible UTI

## 2023-07-04 NOTE — ED ADULT NURSE REASSESSMENT NOTE - NS ED NURSE REASSESS COMMENT FT1
Handoff received from previous RN, pt resting comfortably in bed, speaking full sentences, resting in bed, endorsing mild nausea and groin pain, maintained on cardiac monitor, repeat labs sent.

## 2023-07-04 NOTE — ED PROVIDER NOTE - ATTENDING CONTRIBUTION TO CARE
Attending MD Marga Gaspar:  I personally have seen and examined this patient.  Resident note reviewed and agree on plan of care and except where noted.  See HPI, PE, and MDM for details.

## 2023-07-04 NOTE — ED PROVIDER NOTE - CARE PLAN
1 Principal Discharge DX:	Hypokalemia  Secondary Diagnosis:	NSTEMI (non-ST elevation myocardial infarction)

## 2023-07-04 NOTE — H&P ADULT - PROBLEM SELECTOR PLAN 5
patient started on rocephine in ED >> continue for now  follow urine cultures  patient wares diaper at home ( has on in ED now as well !!   encourage Oral intake

## 2023-07-04 NOTE — H&P ADULT - PROBLEM SELECTOR PLAN 4
Continue home medications and monitor. hypokalemia   repeat BMP ordered  supplement as needed  holding diuretics for now

## 2023-07-04 NOTE — ED ADULT NURSE NOTE - NSFALLHARMRISKINTERV_ED_ALL_ED
Assistance OOB with selected safe patient handling equipment if applicable/Assistance with ambulation/Communicate risk of Fall with Harm to all staff, patient, and family/Monitor gait and stability/Provide visual cue: red socks, yellow wristband, yellow gown, etc/Reinforce activity limits and safety measures with patient and family/Bed in lowest position, wheels locked, appropriate side rails in place/Call bell, personal items and telephone in reach/Instruct patient to call for assistance before getting out of bed/chair/stretcher/Non-slip footwear applied when patient is off stretcher/[place] to call system/Physically safe environment - no spills, clutter or unnecessary equipment/Purposeful Proactive Rounding/Room/bathroom lighting operational, light cord in reach

## 2023-07-04 NOTE — H&P ADULT - PROBLEM SELECTOR PLAN 3
Continue Current medications otherwise and monitor.  cardo to follow patient clinically dehydrated despite elevated Pro-BNP  will give gentle IV hydration  monitor closely  will check Echo to evaluation for CHF ( as patient was on diuretics at home)   oral intake as able   supportive care

## 2023-07-04 NOTE — H&P ADULT - NSHPADDITIONALINFOADULT_GEN_ALL_CORE
Dr. PEEWEE Chung ([phone])   Discussed with the practitioner, nurse Dr. PEEWEE Chung ([phone])

## 2023-07-04 NOTE — ED ADULT NURSE NOTE - NSFALLASSISTNEEDED_ED_ALL_ED
INTERNAL MEDICINE PROGRESS NOTE    Patient: Jo Ann Holliday Date: 3/12/2020   : 1947 Attending: Eduin Mejia MD   72 on call     Standing/Walking/Toileting/Moving from bed to stretcher

## 2023-07-04 NOTE — ED PROVIDER NOTE - NSCAREINITIATED _GEN_ER
thumb     Prolonged emergence from general anesthesia     Sleep apnea 2005    CPAP nightly    Wears glasses        PAST SURGICAL HISTORY:  Past Surgical History:   Procedure Laterality Date    AORTIC VALVE REPLACEMENT  09/14/2016    21mm [facility]    COLECTOMY  1996    perforated bowel: colonoscopy    COLONOSCOPY  9/24/2014; 2004    x2 normal colonoscopies/ perforated bowel    CYST REMOVAL      hairy nevus left biceps as child    KNEE SURGERY Left 2004    torn meniscus     NASAL POLYP SURGERY  1989, 1987    OTHER SURGICAL HISTORY  1990s    kidney stone removal     ROTATOR CUFF REPAIR Right 2009       FAMILY HISTORY:  family history includes Arthritis in his father; Cancer in his paternal grandmother; Diabetes in his brother and another family member; Heart Disease in his father and maternal grandfather; High Blood Pressure in his father; High Cholesterol in his brother and mother; Kidney Disease in his father; Other in his brother; Stroke in his mother. SOCIAL HISTORY:   reports that he has never smoked. He has never used smokeless tobacco.      ALLERGIES:  Patient is allergic to ciprofloxacin. REVIEW OF SYSTEMS:  Constitutional: Negative for fever    HENT: Negative for sore throat  Eyes: Negative for redness   Respiratory: Negative for dyspnea, cough   Musculoskeletal: Negative for arthralgias   Skin: Negative for rash  Neurological: Negative for syncope  Hematological: Negative for adenopathy  Psychiatric/Behavorial: Negative for anxiety    Objective:   PHYSICAL EXAM:  Blood pressure 126/78, pulse 80, temperature 98.9 °F (37.2 °C), temperature source Oral, resp. rate 16, height 5' 11\" (1.803 m), weight 218 lb (98.9 kg), SpO2 98 %.'  Gen: No distress. Eyes: PERRL. No sclera icterus. No conjunctival injection. ENT: No discharge. Pharynx clear. External appearance of ears and nose normal. Mallampati  4  Neck: Trachea midline. No obvious mass. Resp: No accessory muscle use. No crackles.
Marga Gaspar(Attending)

## 2023-07-04 NOTE — H&P ADULT - PROBLEM SELECTOR PLAN 2
Patients creatinine elevated compared to prior readings   Unclear if patients baseline has changed.  Will give short course of IV hydration and revalue it.  If not better imaging of the kidneys. as above  supportive care  starting on low dose Remeron   monitor

## 2023-07-04 NOTE — H&P ADULT - HISTORY OF PRESENT ILLNESS
>>>>>>Medical Attending Initial / Admission note<<<<<<  -------------------------------------------------------------------------------  CHIEF COMPLAINT & HISTORY OF PRESENT ILLNESS:      Patient is a 89yo woman with past medical history of  CAD A FIB HLD p/w nausea, occasional abd pain, decreased oral intake, weakness ...   Patient states for the past several weeks has not been able to eat or drink as much, been nauseated, decreased urine intake and BMs, only at times taking broth and crackers, not been taking medications as much either, ... just feels very nauseous, poor appetite  patient was getting better so decided to come to the ED  No fevers or chills.  Denies any chest pain.  Patient does have a history of atrial fibrillation and is on Eliquis and has been compliant, bradycardic to 40s-50s in ED  patient dehydrated with some electrolyte abnormalities, admitted for further management  patient started in ED on antibiotics for possible UTI    --------------------------------------------------------------------------------  PAST MEDICAL / SURGICAL  HISTORY:    Hyperlipidemia, Acquired  Coronary artery disease  chronic Atrial fibrillation    S/P Hysterectomy  History of Cardiac Cath  H/O: Hysterectomy  History of Cardiac Cath  H/O:  section x2  S/P CABG (coronary artery bypass graft)    --------------------------------------------------------------------------------  FAMILY HISTORY:    Family history of hypertension  Family history of coronary artery disease in son (Child)    --------------------------------------------------------------------------------  SOCIAL HISTORY:  Alcohol: None reported  Smoking: remote smoking history     --------------------------------------------------------------------------------  ALLERGIES:    [As libby, reviewed]    --------------------------------------------------------------------------------  MEDICATIONS:   [As libby, reviewed]    --------------------------------------------------------------------------------  REVIEW OF SYSTEM:    GEN: no fever, no chills, as above . no pain now   RESP: no SOB, no cough, no sputum  CVS: no chest pain, no palpitations, chronic bilateral leg edema on diuretics   GI: no abdominal pain now, nausea as above..   : no dysuria, no frequency, no hematuria  Neuro: no headache, no dizziness  PSYCH: no anxiety, no depression  Derm : no itching, no rash    --------------------------------------------------------------------------------  VITAL SIGNS:    Height (cm): 152.4  Weight (kg): 68  BMI (kg/m2): 29.3  Vital Signs Last 24 HrsT(C): 36.7 (23 @ 11:20)  T(F): 98 (23 @ 11:20), Max: 98.5 (23 @ 02:31)  HR: 52 (23 @ 11:20) (50 - 64)  BP: 121/54 (23 @ 11:20)  RR: 18 (23 @ 11:20) (18 - 18)  SpO2: 95% (23 @ 11:20) (95% - 99%)      on tele in ED patient bradycardic to 40s, asymptomatic   --------------------------------------------------------------------------------  PHYSICAL EXAM:    GEN: A&O X 3 , NAD , comfortable, pleasant, calm  HEENT: NCAT, PERRL, mucose very dry hearing intact  Neck: supple , no JVD appreciated   CVS: S1S2 , regular , No M/R/G appreciated  PULM: CTA B/L,  no W/R/R appreciated  ABD.: soft. non tender, non distended,  bowel sounds not appreciated   Extrem: intact pulses , trace b/l LE edema   Derm: No rash , no ecchymoses  PSYCH : normal mood,  no delusion not anxious    --------------------------------------------------------------------------------  LAB AND IMAGING:   [As libby, dain]    --------------------------------------------------------------------------------  ASSESSMENT AND PLAN:   [As bellow]    --------------------  Case discussed with patient, PMD / cardiologist  aware  ___________________________  H. ELIANA Chung.  Pager: [phone]  23

## 2023-07-05 NOTE — RAPID RESPONSE TEAM SUMMARY - NSSITUATIONBACKGROUNDRRT_GEN_ALL_CORE
89 yo/F w/ PMHx CAD s/p CABG, afib on Eliquis, and HLD who presented for weakness, admitted for UTI. Found to have symptomatic bradycardia. EP consulted for evaluation of pacemaker.    Code blue was called for patient. Upon arrival patient was on the chair, providers and staff were unable to appreciate carotid pulse and chest compressions were initiated. Patient was transferred to bed with backboard. 1mg of epinephrine was given immediately as patient appeared to be in PEA. Upon next pulse check patient appeared to be in vfib and patient was defibrillated with 200J. Patient continued to appeared to be in Vfib and in total 6 epi was given and patient was shocked 5 times. Additionally patient received 300mg of amiodarone and later 150mg of amiodarone later in the code but patient appeared to have been in Vfib. Additionally bicarb was given to attempt to reverse any potential acidosis. Advance airway was obtained by the anesthesia team confirmed by end tidal CO2. The total code duration was roughly 20mins and no pulse/ROSC was obtained. Patient did not appear to be hypovolemic, acidotic, hypo/hyperkalemic based on prior labs, and patient was not hypothermic either. Based on history it is unlikely patient had a tension pneumothorax/cardiac tamponade/spontaneous massive PE/NSTEMI. At this time it is unclear why patient went into PEA arrest and there seems to be no apparent reversible causes, in regards to the vfib the epinephrine may have contributed to ectopy. Cardiology was also called during code as well. Primary team attempted to reach family but was unable to reach them until after the code. Patient was pronounced at 8:27pm on 7/5/23. Primary team will update family as well as attending.

## 2023-07-05 NOTE — AIRWAY PLACEMENT NOTE ADULT - AIRWAY COMMENTS:
Blood noted in airway on initial DL, unable to intubate via DL with compressions Glidescope requested positive ETCO2

## 2023-07-05 NOTE — PATIENT PROFILE ADULT - ABILITY TO HEAR (WITH HEARING AID OR HEARING APPLIANCE IF NORMALLY USED):
WDL
Mildly to Moderately Impaired: difficulty hearing in some environments or speaker may need to increase volume or speak distinctly

## 2023-07-05 NOTE — CONSULT NOTE ADULT - SUBJECTIVE AND OBJECTIVE BOX
CHIEF COMPLAINT: abd pain, weakness    HISTORY OF PRESENT ILLNESS:  89yo woman with past medical history of  CAD A FIB HLD p/w nausea, occasional abd pain, decreased oral intake, weakness ...   Patient states for the past several weeks has not been able to eat or drink as much, been nauseated, decreased urine intake and BMs, only at times taking broth and crackers, not been taking medications as much either, ... just feels very nauseous, poor appetite  patient was getting better so decided to come to the ED  No fevers or chills.  Denies any chest pain.  Patient does have a history of atrial fibrillation and is on Eliquis and has been compliant, bradycardic to 40s-50s in ED  patient dehydrated with some electrolyte abnormalities, admitted for further management  patient started in ED on antibiotics for possible UTI      Allergies    codeine (Stomach Upset)    Intolerances    	    MEDICATIONS:  apixaban 2.5 milliGRAM(s) Oral every 12 hours    cefTRIAXone   IVPB 1000 milliGRAM(s) IV Intermittent every 24 hours      acetaminophen     Tablet .. 650 milliGRAM(s) Oral every 6 hours PRN  mirtazapine 7.5 milliGRAM(s) Oral at bedtime  ondansetron Injectable 4 milliGRAM(s) IV Push every 8 hours PRN    aluminum hydroxide/magnesium hydroxide/simethicone Suspension 30 milliLiter(s) Oral every 4 hours PRN  metoclopramide 5 milliGRAM(s) Oral three times a day before meals  metoclopramide          sodium chloride 0.9%. 1000 milliLiter(s) IV Continuous <Continuous>      PAST MEDICAL & SURGICAL HISTORY:  Hyperlipidemia, Acquired      Coronary artery disease      Atrial fibrillation, unspecified type      S/P Hysterectomy      History of Cardiac Cath      H/O: Hysterectomy      History of Cardiac Cath      H/O:  section  x2      S/P CABG (coronary artery bypass graft)          FAMILY HISTORY:  Family history of hypertension    Family history of coronary artery disease in son (Child)        SOCIAL HISTORY:    non smoker. indep in adl      REVIEW OF SYSTEMS:  See HPI, otherwise complete 10 point review of systems negative    [ ] All others negative	      PHYSICAL EXAM:  T(C): 36.7 (23 @ 04:15), Max: 37 (23 @ 18:54)  HR: 41 (23 @ 04:15) (40 - 55)  BP: 150/70 (23 @ 04:15) (121/54 - 150/70)  RR: 18 (23 @ 04:15) (18 - 18)  SpO2: 95% (23 @ 04:15) (95% - 95%)  Wt(kg): --  I&O's Summary      Appearance: No Acute Distress	  HEENT:  Normal oral mucosa, PERRL, EOMI	  Cardiovascular: Normal S1 S2, No JVD, No murmurs/rubs/gallops  Respiratory: Lungs clear to auscultation bilaterally  Gastrointestinal:  Soft, Non-tender, + BS	  Skin: No rashes, No ecchymoses, No cyanosis	  Neurologic: Non-focal  Extremities: No clubbing, cyanosis or edema  Vascular: Peripheral pulses palpable 2+ bilaterally  Psychiatry: A & O x 3, Mood & affect appropriate    Laboratory Data:	 	    CBC Full  -  ( 2023 05:37 )  WBC Count : 7.56 K/uL  Hemoglobin : 12.4 g/dL  Hematocrit : 39.9 %  Platelet Count - Automated : 287 K/uL  Mean Cell Volume : 91.9 fl  Mean Cell Hemoglobin : 28.6 pg  Mean Cell Hemoglobin Concentration : 31.1 gm/dL  Auto Neutrophil # : x  Auto Lymphocyte # : x  Auto Monocyte # : x  Auto Eosinophil # : x  Auto Basophil # : x  Auto Neutrophil % : x  Auto Lymphocyte % : x  Auto Monocyte % : x  Auto Eosinophil % : x  Auto Basophil % : x        138  |  93<L>  |  51<H>  ----------------------------<  104<H>  3.5   |  29  |  1.03  -04    x   |  x   |  x   ----------------------------<  x   3.1<L>   |  x   |  x     Ca    10.4      2023 05:37  Ca    10.5      2023 13:29  Mg     2.7     07-  Mg     2.4     07-    TPro  7.6  /  Alb  3.9  /  TBili  1.0  /  DBili  x   /  AST  38  /  ALT  15  /  AlkPhos  88  07-05  TPro  7.1  /  Alb  4.1  /  TBili  1.4<H>  /  DBili  x   /  AST  56<H>  /  ALT  19  /  AlkPhos  84  07-04      proBNP:   Lipid Profile:   HgA1c:   TSH:       CARDIAC MARKERS:            Interpretation of Telemetry: 	    ECG:  	  RADIOLOGY:  OTHER: 	    PREVIOUS DIAGNOSTIC TESTING:    [ ] Echocardiogram:  [ ] Catheterization:  [ ] Stress Test:  	    Assessment:  89yo woman with past medical history of  CAD A FIB HLD p/w nausea, occasional abd pain, decreased oral intake, weakness     Recs:  cardiac stable  elevated hs trop with neg delta and no true ischemic sx, unable to evaluate for ischemic sx on ECG due to LBBB morphology, would defer ischemic evaluation at this time  obtain TTE  tele monitoring  EPS consult for slow afib. hold AVN blockers. cw eliquis for now  will follow        Greater than 60 minutes spent on total encounter; more than 50% of the visit was spent counseling and/or coordinating care by the attending physician.   	  Yung Ballard MD   Cardiovascular Diseases  [phone]

## 2023-07-05 NOTE — CONSULT NOTE ADULT - ASSESSMENT
This is a 89 yo/F w/ PMHx CAD s/p CABG, afib on Eliquis, and HLD who presented for weakness, admitted for UTI. Found to have symptomatic bradycardia. EP consulted for evaluation of pacemaker.    #Symptomatic bradycardia:  - recommend TTE to eval cardiac function and whether ICD may be needed vs pacemaker alone   - obtain tick panel, lyme studies to rule out organic causes     - TSH wnl, electrolytes wnl/repleted  - c/w Eliquis 2.5mg BID    Attending Addendum to follow This is a 89 yo/F w/ PMHx CAD s/p CABG, afib on Eliquis, and HLD who presented for weakness, admitted for UTI. Found to have symptomatic bradycardia. EP consulted for evaluation of pacemaker.    #Bradycardia:   - symptoms of weakness/dizziness persistent for the last year, no acute change. Symptoms of weakness this time may also be confounded by UTI that's currently being treated vs deconditioning with age      - no indications for PPM placement at this time   - recommend TTE to eval cardiac function   - obtain orthostatics as dizziness is present in early AM after getting up from bed  - PT eval, SW eval   - obtain tick panel, lyme studies    - TSH wnl, electrolytes wnl/repleted  - c/w Eliquis 2.5mg BID  - hold home metoprolol succinate -- may consider ARB if needing BP control    - Monitor electrolytes and replete K to 4 and Mg to 2  - Continue care per primary team    Patient to be staffed with attending. Please await attending addendum This is a 89 yo/F w/ PMHx CAD s/p CABG, afib on Eliquis, and HLD who presented for weakness, admitted for UTI. Found to have symptomatic bradycardia. EP consulted for evaluation of pacemaker.    #Bradycardia:   - symptoms of weakness/dizziness persistent for the last year, no acute change. Reported syncope over a year ago. Symptoms of weakness this time may also be confounded by UTI that's currently being treated vs deconditioning with age      - no indications for PPM placement at this time   - recommend TTE to eval cardiac function   - obtain orthostatics as dizziness is present in early AM after getting up from bed  - PT eval, SW eval   - obtain tick panel, lyme studies    - TSH wnl, electrolytes wnl/repleted  - c/w Eliquis 2.5mg BID  - hold home metoprolol succinate -- may consider ARB if needing BP control upon dc     - Monitor electrolytes and replete K to 4 and Mg to 2  - Continue care per primary team    Patient to be staffed with attending. Please await attending addendum This is a 89 yo/F w/ PMHx CAD s/p CABG, afib on Eliquis, and HLD who presented for weakness, admitted for UTI. Found to have symptomatic bradycardia. EP consulted for evaluation of pacemaker.    #Bradycardia:   - symptoms of weakness/dizziness persistent for the last year, no acute change. Reported syncope over a year ago. Symptoms of weakness this time may also be confounded by UTI that's currently being treated vs deconditioning with age      - no indications for PPM placement at this time      - recommend d/c metoprolol succinate and outpt cardiology f/u  - recommend TTE to eval cardiac function   - obtain orthostatics as dizziness is present in early AM after getting up from bed  - PT eval, SW eval   - TSH wnl, electrolytes wnl/repleted  - c/w Eliquis 2.5mg BID  - Monitor electrolytes and replete K to 4 and Mg to 2  - Continue care per primary team. EP to sign off at this time, please re-consult if questions arise.    Patient to be staffed with attending. Please await attending addendum

## 2023-07-05 NOTE — DISCHARGE NOTE FOR THE EXPIRED PATIENT - HOSPITAL COURSE
89F w/ PMHx of CAD s/p CABG, AFib on Eliquis, and HLD chief complaint of nausea, occasional abd pain, decreased oral intake, and weakness.  Pt. states that she's been feeling weak and dizzy (especially in the morning when she has to get up and prepare own meal) for the last year.  She hasn't had a rapid decline, but does feel like she's been gradually declining.  She is still able to tolerate walking without dyspnea.  Denies CP, denies palpitations.  She's been told in the past that her HR is slow, and no intervention has been done.    In the ED, pt. bradycardic to 40s-50s, remaining vital signs stable  Labs were significant for K+ 2.6, TSH/fT4 wnl and troponin peaked at admission's 133   CXR revealed no PNA  EKG showed afib w/ bradycardia  UA c/w UTI, started on IV Ceftriaxone    # Elevated hs trop w/ neg delta and no true ischemic sx, unable to evaluate for ischemic sx on ECG d/t LBBB morphology.  - Cardiology consulted, recs appreciated  - Deferred ischemic evaluation     # Bradycardia, symptoms of weakness/dizziness persistent for the last year, no acute change. Reported syncope over a year ago. Symptoms of weakness this time may also be confounded by UTI that's currently being treated vs deconditioning with age.  - EPS consulted and recs appreciated  - No indications for PPM placement at this time   - Recommend d/c Metoprolol succinate and outpt cardiology f/u  - TTE to eval cardiac function:    1. Mildly dilated left ventricular cavity size. The left ventricular systolic function is moderately decreased with an ejection fraction of 42 % by Moreno's method of disks.   2. Basal inferior segment, basal and mid inferior septum, mid and apical inferior wall, and basal and mid inferolateral wall are abnormal.   3. There is severe (grade 3) left ventricular diastolic dysfunction.   4. Normal right ventricular cavity size and reduced systolic right ventricular function.   5. The right atrium is severely dilated.   6. Moderate-severe tricuspid regurgitation.   7. No prior echocardiogram is available for comparison.  - Monitor electrolytes and replete K to 4 and Mg to 2    # Nausea unclear etiology  - No significant stool burden on CT    7/5/2023:   Pt. found unresponsive and Code blue was called.  Code duration was roughly 20mins and no pulse/ROSC was obtained.  Pt. pronounced at 20:28 on 7/5/2023.

## 2023-07-05 NOTE — CHART NOTE - NSCHARTNOTEFT_GEN_A_CORE
MEDICINE PA EPISODIC NOTE    Notified by RN of pt. having bradycardia on tele w/ HR going down to 36 while pt. was asleep. Pt. seen and examined at bedside, AOx3, resting, in NAD. Pt. denies CP, palpitations, difficulty breathing or any other acute symptoms. EKG done shows AF w/ slowed ventricular rate w/ HR of 41. Case d/w Dr. Chung overnight. Will continue to monitor and f/u cardiology in AM.    Monroe Cabrera PA-C  Dept. of Medicine  [facility] [phone]

## 2023-07-05 NOTE — PATIENT PROFILE ADULT - FALL HARM RISK - HARM RISK INTERVENTIONS

## 2023-07-05 NOTE — CONSULT NOTE ADULT - SUBJECTIVE AND OBJECTIVE BOX
Source: patient and Chart    HPI:  Patient is a 89y old  Female who presents with a chief complaint of weakness and hypokalemia, found to have UTI.     Patient denied fever, chills, diaphoresis, HA, lightheadedness, dizziness, changes in visions, cold like symptoms  (sore throat cough, conjunctivitis runny nose), CP, palpitation, SOB, abdominal pain, n/v/d, hematuria, melena, hematochezia numbness and tingling    ED course was significant for T=  BP___ RR___spo2 __% on RA. Labs were significant for WB __, Hgb __, HCT __, Na __, K __, sCr __, TSH __,  FT4 __, and Troponin 1 negative x2.  CXR revealed __. and EKG.    PAST MEDICAL & SURGICAL HISTORY:  Hyperlipidemia, Acquired  Coronary artery disease  Atrial fibrillation, unspecified type  S/P Hysterectomy  History of Cardiac Cath  H/O: Hysterectomy  History of Cardiac Cath  H/O:  section  x2  S/P CABG (coronary artery bypass graft)    MEDICATIONS  (STANDING):  apixaban 2.5 milliGRAM(s) Oral every 12 hours  cefTRIAXone   IVPB 1000 milliGRAM(s) IV Intermittent every 24 hours  metoclopramide 5 milliGRAM(s) Oral three times a day before meals  metoclopramide      mirtazapine 7.5 milliGRAM(s) Oral at bedtime  sodium chloride 0.9%. 1000 milliLiter(s) (60 mL/Hr) IV Continuous <Continuous>    MEDICATIONS  (PRN):  acetaminophen     Tablet .. 650 milliGRAM(s) Oral every 6 hours PRN Temp greater or equal to 38C (100.4F), Mild Pain (1 - 3)  aluminum hydroxide/magnesium hydroxide/simethicone Suspension 30 milliLiter(s) Oral every 4 hours PRN Dyspepsia  ondansetron Injectable 4 milliGRAM(s) IV Push every 8 hours PRN Nausea and/or Vomiting      FAMILY HISTORY:  Family history of hypertension    Family history of coronary artery disease in son (Child)        SOCIAL HISTORY:    LIVING SITUATION:  CIGARETTES: Denied  ALCOHOL: denied   ILLICIT DRUG USES: denied    REVIEW OF SYSTEMS:  CONSTITUTIONAL: No fever, weight loss, chills, shakes, or fatigue  EYES: No eye pain, visual disturbances, or discharge  ENMT:  No difficulty hearing, tinnitus, vertigo; No sinus or throat pain  NECK: No pain or stiffness  RESPIRATORY: No cough, wheezing, hemoptysis, or shortness of breath  CARDIOVASCULAR: No chest pain, dyspnea, palpitations, dizziness, syncope, paroxysmal nocturnal dyspnea, orthopnea, or arm or leg swelling  GASTROINTESTINAL: No abdominal  or epigastric pain, nausea, vomiting, hematemesis, diarrhea, constipation, melena or bright red blood.  GENITOURINARY: No dysuria, nocturia, hematuria, or urinary incontinence  NEUROLOGICAL: No headaches, memory loss, slurred speech, limb weakness, loss of strength, numbness, or tremors  MUSCULOSKELETAL: No joint pain or swelling, muscle, back, or extremity pain  PSYCHIATRIC: No depression, anxiety, or difficulty sleeping        Vital Signs Last 24 Hrs  T(C): 36.7 (2023 04:15), Max: 37 (2023 18:54)  T(F): 98.1 (2023 04:15), Max: 98.6 (2023 18:54)  HR: 41 (2023 04:15) (40 - 52)  BP: 150/70 (2023 04:15) (121/54 - 150/70)  BP(mean): --  RR: 18 (2023 04:15) (18 - 18)  SpO2: 95% (2023 04:15) (95% - 95%)    Parameters below as of 2023 04:15  Patient On (Oxygen Delivery Method): room air        PHYSICAL EXAM:  GENERAL: Well appearing, speaking in full sentence, in NAD  HEAD:  Atraumatic, Normocephalic  EYES: EOMI, PERRLA, conjunctiva and sclera clear  ENMT: No tonsillar erythema, exudates, or enlargement; Moist mucous membranes, Good dentition, No lesions  NECK: Supple and normal thyroid.  No JVD or carotid bruit.  Carotid pulse is 2+ bilaterally.  HEART: S1S2 RRR; No murmurs, rubs, or gallops appreciated .  PULMONARY:CTABL, normal respiratory effort.  No rales, wheezing, or rhonchi appreciated bilaterally  ABDOMEN: Bowel sounds present, soft, NDNT  EXTREMITIES:  Warm, well -perfused, no pedal edema, distal pulses present  NEUROLOGICAL:AOx3 ,  motor function grossly  intact    INTERPRETATION OF TELEMETRY:    ECG:    I&O's Detail      LABS:                        12.4   7.56  )-----------( 287      ( 2023 05:37 )             39.9     07-    138  |  93<L>  |  51<H>  ----------------------------<  104<H>  3.5   |  29  |  1.03    Ca    10.4      2023 05:37  Mg     2.7     07-    TPro  7.6  /  Alb  3.9  /  TBili  1.0  /  DBili  x   /  AST  38  /  ALT  15  /  AlkPhos  88  07-        PT/INR - ( 2023 04:44 )   PT: 17.6 sec;   INR: 1.51 ratio         PTT - ( 2023 04:44 )  PTT:31.1 sec  Urinalysis Basic - ( 2023 05:37 )    Color: x / Appearance: x / SG: x / pH: x  Gluc: 104 mg/dL / Ketone: x  / Bili: x / Urobili: x   Blood: x / Protein: x / Nitrite: x   Leuk Esterase: x / RBC: x / WBC x   Sq Epi: x / Non Sq Epi: x / Bacteria: x      BNP  I&O's Detail    Daily     Daily     RADIOLOGY & ADDITIONAL STUDIES:        ASSESSMENT:        RECOMMENDATIONS:  - Continuous telemetric monitoring  - Monitor electrolytes and replete K to 4 and Mg to 2  - Continue Eliquis for thromboembolic ppx  given that patient has a [id number] score of   - Continue care per primary team    Patient to be staffed with attending. Please await attending addendum Source: patient and Chart    HPI:  Patient is a 89y old  Female who presents with a chief complaint of weakness and hypokalemia, found to have UTI. Pt states that she's been feeling weak and dizzy (especially in the morning when she has to get up and prepare own meal) for the last year. She hasn't had a rapid decline, but does feel like she's been gradually declining. She is still able to tolerate walking without dyspnea. Denies CP, denies palpitations. She's been told in the past that her HR is slow, and no intervention has been done.    In the ED, VSS. Labs were significant for K 2.6. TSH/fT4 wnl and Troponin peaked at admission's 133.  CXR revealed no PNA and EKG showed afib w/ bradycardia. UA consistent with UTI, treated w/ ceftriaxone.    PAST MEDICAL & SURGICAL HISTORY:  Hyperlipidemia, Acquired  Coronary artery disease  Atrial fibrillation, unspecified type  S/P Hysterectomy  History of Cardiac Cath  H/O: Hysterectomy  History of Cardiac Cath  H/O:  section  x2  S/P CABG (coronary artery bypass graft)    MEDICATIONS  (STANDING):  apixaban 2.5 milliGRAM(s) Oral every 12 hours  cefTRIAXone   IVPB 1000 milliGRAM(s) IV Intermittent every 24 hours  metoclopramide 5 milliGRAM(s) Oral three times a day before meals  metoclopramide      mirtazapine 7.5 milliGRAM(s) Oral at bedtime  sodium chloride 0.9%. 1000 milliLiter(s) (60 mL/Hr) IV Continuous <Continuous>    MEDICATIONS  (PRN):  acetaminophen     Tablet .. 650 milliGRAM(s) Oral every 6 hours PRN Temp greater or equal to 38C (100.4F), Mild Pain (1 - 3)  aluminum hydroxide/magnesium hydroxide/simethicone Suspension 30 milliLiter(s) Oral every 4 hours PRN Dyspepsia  ondansetron Injectable 4 milliGRAM(s) IV Push every 8 hours PRN Nausea and/or Vomiting      FAMILY HISTORY:  Family history of hypertension    Family history of coronary artery disease in son (Child)        SOCIAL HISTORY:    LIVING SITUATION:  CIGARETTES: Denied  ALCOHOL: denied   ILLICIT DRUG USES: denied    REVIEW OF SYSTEMS:  CONSTITUTIONAL: No fever, weight loss, chills, shakes, or fatigue  EYES: No eye pain, visual disturbances, or discharge  ENMT:  No difficulty hearing, tinnitus, vertigo; No sinus or throat pain  NECK: No pain or stiffness  RESPIRATORY: No cough, wheezing, hemoptysis, or shortness of breath  CARDIOVASCULAR: No chest pain, dyspnea, palpitations, dizziness, syncope, paroxysmal nocturnal dyspnea, orthopnea, or arm or leg swelling  GASTROINTESTINAL: No abdominal  or epigastric pain, nausea, vomiting, hematemesis, diarrhea, constipation, melena or bright red blood.  GENITOURINARY: No dysuria, nocturia, hematuria, or urinary incontinence  NEUROLOGICAL: No headaches, memory loss, slurred speech, limb weakness, loss of strength, numbness, or tremors  MUSCULOSKELETAL: No joint pain or swelling, muscle, back, or extremity pain  PSYCHIATRIC: No depression, anxiety, or difficulty sleeping        Vital Signs Last 24 Hrs  T(C): 36.7 (2023 04:15), Max: 37 (2023 18:54)  T(F): 98.1 (2023 04:15), Max: 98.6 (2023 18:54)  HR: 41 (2023 04:15) (40 - 52)  BP: 150/70 (2023 04:15) (121/54 - 150/70)  BP(mean): --  RR: 18 (2023 04:15) (18 - 18)  SpO2: 95% (2023 04:15) (95% - 95%)    Parameters below as of 2023 04:15  Patient On (Oxygen Delivery Method): room air        PHYSICAL EXAM:  GENERAL: Well appearing, speaking in full sentence, in NAD. Eyes closed for most of the exam  HEAD:  Atraumatic, Normocephalic  EYES: EOMI, conjunctiva and sclera clear  NECK: Supple and normal thyroid.   HEART: S1/S2, bradycardia, without murmur  PULMONARY: CTABL, normal respiratory effort.  No rales, wheezing, or rhonchi appreciated bilaterally  ABDOMEN: Bowel sounds present, soft, NDNT  EXTREMITIES:  Warm, well -perfused, no pedal edema, distal pulses present  NEUROLOGICAL:AOx3 ,  motor function grossly  intact    INTERPRETATION OF TELEMETRY: afib 40-60s    ECG: RBBB and L anterior fasicular block in afib w/ bradycardia at 49bpm    I&O's Detail      LABS:                        12.4   7.56  )-----------( 287      ( 2023 05:37 )             39.9     07-05    138  |  93<L>  |  51<H>  ----------------------------<  104<H>  3.5   |  29  |  1.03    Ca    10.4      2023 05:37  Mg     2.7     07-04    TPro  7.6  /  Alb  3.9  /  TBili  1.0  /  DBili  x   /  AST  38  /  ALT  15  /  AlkPhos  88  07-05        PT/INR - ( 2023 04:44 )   PT: 17.6 sec;   INR: 1.51 ratio         PTT - ( 2023 04:44 )  PTT:31.1 sec  Urinalysis Basic - ( 2023 05:37 )    Color: x / Appearance: x / SG: x / pH: x  Gluc: 104 mg/dL / Ketone: x  / Bili: x / Urobili: x   Blood: x / Protein: x / Nitrite: x   Leuk Esterase: x / RBC: x / WBC x   Sq Epi: x / Non Sq Epi: x / Bacteria: x

## 2023-07-05 NOTE — PROGRESS NOTE ADULT - ASSESSMENT
_________________________________________________________________________________________  ========>>  M E D I C A L   A T T E N D I N G    F O L L O W  U P  N O T E  <<=========  -----------------------------------------------------------------------------------------------------    - Patient seen and examined by me earlier today.   - In summary,  ZAYNAB EARLY is a 89y year old woman admitted with   - Patient today overall doing ok, comfortable, eating OK.     noted events overnight... patient with significant bradycardia.. reportedly asymptomatic ..     ==================>> REVIEW OF SYSTEM <<=================    GEN: no fever, no chills, no pain  RESP: no SOB, no cough, no sputum  CVS: no chest pain, no palpitations, no edema  GI: no abdominal pain, no nausea, no constipation, no diarrhea  : no dysuria, no frequency, no hematuria  Neuro: no headache, no dizziness  Derm : no itching, no rash    ==================>> PHYSICAL EXAM <<=================    GEN: A&O X 3 , NAD , comfortable, pleasant, calm in chair   HEENT: NCAT, PERRL, MMM, a little hard of hearing   Neck: supple , no JVD appreciated  CVS: S1S2 , regular , No M/R/G appreciated  PULM: CTA B/L,  no W/R/R appreciated  ABD.: soft. non tender, non distended,  bowel sounds present  Extrem: intact pulses , no edema   PSYCH : normal mood,  not anxious                            ( Note Written / Date of service :  07-05-23 )    ==================>> MEDICATIONS <<====================    MEDICATIONS  (STANDING):  apixaban 2.5 milliGRAM(s) Oral every 12 hours  cefTRIAXone   IVPB 1000 milliGRAM(s) IV Intermittent every 24 hours  chlorhexidine 2% Cloths 1 Application(s) Topical <User Schedule>  metoclopramide      metoclopramide 5 milliGRAM(s) Oral three times a day before meals  mirtazapine 7.5 milliGRAM(s) Oral at bedtime  sodium chloride 0.9%. 1000 milliLiter(s) (60 mL/Hr) IV Continuous <Continuous>    MEDICATIONS  (PRN):  acetaminophen     Tablet .. 650 milliGRAM(s) Oral every 6 hours PRN Temp greater or equal to 38C (100.4F), Mild Pain (1 - 3)  aluminum hydroxide/magnesium hydroxide/simethicone Suspension 30 milliLiter(s) Oral every 4 hours PRN Dyspepsia  ondansetron Injectable 4 milliGRAM(s) IV Push every 8 hours PRN Nausea and/or Vomiting    ___________  Active diet:  Diet, Full Liquid:   Supplement Feeding Modality:  Oral  Ensure Max Cans or Servings Per Day:  1       Frequency:  Daily  Ensure Plant-Based Cans or Servings Per Day:  1       Frequency:  Daily  ___________________    ==================>> VITAL SIGNS <<==================    T(C): 36.7 (07-05-23 @ 12:44), Max: 37 (07-04-23 @ 18:54)  HR: 50 (07-05-23 @ 12:44) (40 - 50)  BP: 125/53 (07-05-23 @ 12:44) (125/53 - 150/70)  RR: 18 (07-05-23 @ 12:44) (18 - 18)  SpO2: 96% (07-05-23 @ 12:44) (95% - 96%)      ==================>> LAB AND IMAGING <<==================                        12.4   7.56  )-----------( 287      ( 05 Jul 2023 05:37 )             39.9        07-05    138  |  93<L>  |  51<H>  ----------------------------<  104<H>  3.5   |  29  |  1.03    Ca    10.4      05 Jul 2023 05:37  Mg     2.7     07-04    TPro  7.6  /  Alb  3.9  /  TBili  1.0  /  DBili  x   /  AST  38  /  ALT  15  /  AlkPhos  88  07-05    PT/INR - ( 04 Jul 2023 04:44 )   PT: 17.6 sec;   INR: 1.51 ratio    PTT - ( 04 Jul 2023 04:44 )  PTT:31.1 sec               Urinalysis Basic - ( 05 Jul 2023 05:37 )  Color: x / Appearance: x / SG: x / pH: x  Gluc: 104 mg/dL / Ketone: x  / Bili: x / Urobili: x   Blood: x / Protein: x / Nitrite: x   Leuk Esterase: x / RBC: x / WBC x   Sq Epi: x / Non Sq Epi: x / Bacteria: x    TSH:      3.86   (07-04-23)       ,     4.26   (05-22-23)           HgA1C:   (05-22-23)          (05-22-23)      5.6    ___________________________________________________________________________________  ===============>>  A S S E S S M E N T   A N D   P L A N <<===============  ------------------------------------------------------------------------------------------    · Assessment	  Patient is a 89yo woman with past medical history of  CAD A FIB HLD p/w nausea, occasional abd pain, decreased oral intake, weakness ...   Patient states for the past several weeks has not been able to eat or drink as much, been nauseated, decreased urine intake and BMs, only at times taking broth and crackers, not been taking medications as much either, ... just feels very nauseous, poor appetite  patient was getting better so decided to come to the ED  No fevers or chills.  Denies any chest pain.  Patient does have a history of atrial fibrillation and is on Eliquis and has been compliant, bradycardic to 40s-50s in ED  patient dehydrated with some electrolyte abnormalities, admitted for further management  patient started in ED on antibiotics for possible UTI       Problem/Plan - 1:  ·  Problem: Nausea.  ·  Plan: unclear etiology  no significant stool burden on CT  ? possible recent viral illness with residual ( there has been a lot of Norovirus in the community)   supportive care for now as seems improved and patient eating some   antiemetics ATC for now for a few days  advance diet as able / tolerated   OOB to chair as able     Problem/Plan - 2:  ·  Problem: Anorexia symptom.  ·  Plan: as above  supportive care  starting on low dose Remeron   monitor.    Problem/Plan - 3:  ·  Problem: Dehydration.  ·  Plan: patient clinically dehydrated despite elevated Pro-BNP  post gentle IV hydration  monitor closely  will check Echo to evaluation for CHF ( as patient was on diuretics at home)   oral intake as able   supportive care.    Problem/Plan - 4:  ·  Problem: Electrolyte disturbance.  ·  Plan: hypokalemia   repeat BMP ordered  supplement as needed  holding diuretics for now.    Problem/Plan - 5:  ·  Problem: Acute UTI.   ·  Plan: patient started on rocephine in ED >> continue for now  follow urine cultures  patient wares diaper at home !   encourage Oral intake.    Problem/Plan - 6:  ·  Problem: Bradycardia.   ·  Plan: AF with grzegorz  hold Toprol   monitor closely  cardiologist and EP appreciated     Problem/Plan - 7:  ·  Problem: Chronic atrial fibrillation.   ·  Plan: as above  holding Toprol for now  continue Eliquis.    -GI/DVT Prophylaxis per protocol.    --------------------------------------------  Case discussed with patient,   Education given on findings and plan of care  ___________________________  H. ELIANA Chung.  Pager: [phone]

## 2023-07-05 NOTE — PROVIDER CONTACT NOTE (OTHER) - ASSESSMENT
Patient is sleeping comfortably, is asymptomatic, no complaints of chest pain, shortness of breath, palpitations. Blood pressure within desired range.

## 2023-07-05 NOTE — CONSULT NOTE ADULT - ATTENDING COMMENTS
Philip AF on beta blockers. Currently holding therapy and will assess for Micra implant if rates fail to improve.

## 2023-07-06 LAB
MRSA PCR RESULT.: SIGNIFICANT CHANGE UP
S AUREUS DNA NOSE QL NAA+PROBE: SIGNIFICANT CHANGE UP

## 2023-08-24 NOTE — ED ADULT TRIAGE NOTE - CCCP TRG CHIEF CMPLNT
chest pain
What Type Of Note Output Would You Prefer (Optional)?: Standard Output
Hpi Title: Evaluation of Skin Lesions
How Severe Are Your Spot(S)?: mild
Have Your Spot(S) Been Treated In The Past?: has not been treated
Additional History: \\n\\n\\nPt c/o red bumpy rash under breasts

## 2023-10-31 NOTE — ED ADULT TRIAGE NOTE - PRO INTERPRETER NEED 2
Improving No Change jfk: US neg for DVT per tech. Improving Improving Improving Improving Improving English Improving Improving Improving Improving No Change Improving

## 2024-07-24 NOTE — DIETITIAN INITIAL EVALUATION ADULT - NSFNSPHYEXAMSKINFT_GEN_A_CORE
Nurses Note -- 4 Eyes      7/24/2024   9:29 AM      Skin assessed during: Q Shift Change      [] No Altered Skin Integrity Present    []Prevention Measures Documented      [] Yes- Altered Skin Integrity Present or Discovered   [] LDA Added if Not in Epic (Describe Wound)   [] New Altered Skin Integrity was Present on Admit and Documented in LDA   [] Wound Image Taken    Wound Care Consulted? No    Attending Nurse:  Shanda Garcia RN/Staff Member: Brook          Intact w/ no pressure injuries noted per RN flowsheets

## 2025-05-26 NOTE — ED PROVIDER NOTE - CLINICAL SUMMARY MEDICAL DECISION MAKING FREE TEXT BOX
Attending Marga Gaspar: 89 yo female with multiple medical issues presenting with decreaesed po and weakness. upon arrival pt frail appearing. ekg performed showing bradyarrythmia, with nonspecific changes. pt denies any chest pain but did report nausea. blood work performed showing elevation troponin. cards fellow consulted and recommend calling pt's cardiolgoist . attempted to call pt's cardiolgoist and left a message. pt on repeat evaluation pt denies any chest pain or black stools. blood work showed sig electrolyte abnormalites. pt given replacement, will monitor ekgs, and d/w pt's cardiolgoist. spoke with admitting doctor and prefers holding on heparin gtt at ths time and continue to trend enzymes. pt given aspirin. abdomen soft and nontende.r no pulsatile mass on exam and strong distal pulses making AAA less likely. pt with mild LE edema, will obtain duplex
Speaking Coherently